# Patient Record
Sex: MALE | Race: BLACK OR AFRICAN AMERICAN | NOT HISPANIC OR LATINO | Employment: OTHER | URBAN - METROPOLITAN AREA
[De-identification: names, ages, dates, MRNs, and addresses within clinical notes are randomized per-mention and may not be internally consistent; named-entity substitution may affect disease eponyms.]

---

## 2017-01-25 DIAGNOSIS — M22.2X2 PATELLOFEMORAL SYNDROME, LEFT: ICD-10-CM

## 2017-01-25 DIAGNOSIS — M22.41 PATELLA, CHONDROMALACIA, RIGHT: Primary | ICD-10-CM

## 2017-03-02 ENCOUNTER — TRANSCRIBE ORDERS (OUTPATIENT)
Dept: LAB | Facility: HOSPITAL | Age: 57
End: 2017-03-02

## 2017-03-02 ENCOUNTER — LAB (OUTPATIENT)
Dept: LAB | Facility: HOSPITAL | Age: 57
End: 2017-03-02
Attending: ORTHOPAEDIC SURGERY

## 2017-03-02 DIAGNOSIS — Z01.818 PRE-OP TESTING: ICD-10-CM

## 2017-03-02 DIAGNOSIS — Z01.818 PRE-OP TESTING: Primary | ICD-10-CM

## 2017-03-02 LAB
ALBUMIN SERPL-MCNC: 3.9 G/DL (ref 3.5–5.2)
ALBUMIN/GLOB SERPL: 0.9 G/DL
ALP SERPL-CCNC: 75 U/L (ref 39–117)
ALT SERPL W P-5'-P-CCNC: 17 U/L (ref 1–41)
ANION GAP SERPL CALCULATED.3IONS-SCNC: 12 MMOL/L
AST SERPL-CCNC: 18 U/L (ref 1–40)
BASOPHILS # BLD AUTO: 0.07 10*3/MM3 (ref 0–0.2)
BASOPHILS NFR BLD AUTO: 0.6 % (ref 0–1.5)
BILIRUB SERPL-MCNC: 1 MG/DL (ref 0.1–1.2)
BUN BLD-MCNC: 10 MG/DL (ref 6–20)
BUN/CREAT SERPL: 11 (ref 7–25)
CALCIUM SPEC-SCNC: 9.5 MG/DL (ref 8.6–10.5)
CHLORIDE SERPL-SCNC: 104 MMOL/L (ref 98–107)
CO2 SERPL-SCNC: 24 MMOL/L (ref 22–29)
CREAT BLD-MCNC: 0.91 MG/DL (ref 0.76–1.27)
DEPRECATED RDW RBC AUTO: 46 FL (ref 37–54)
EOSINOPHIL # BLD AUTO: 0.33 10*3/MM3 (ref 0–0.7)
EOSINOPHIL NFR BLD AUTO: 2.9 % (ref 0.3–6.2)
ERYTHROCYTE [DISTWIDTH] IN BLOOD BY AUTOMATED COUNT: 16.5 % (ref 11.5–14.5)
GFR SERPL CREATININE-BSD FRML MDRD: 104 ML/MIN/1.73
GLOBULIN UR ELPH-MCNC: 4.3 GM/DL
GLUCOSE BLD-MCNC: 89 MG/DL (ref 65–99)
HCT VFR BLD AUTO: 29.4 % (ref 40.4–52.2)
HGB BLD-MCNC: 10.6 G/DL (ref 13.7–17.6)
IMM GRANULOCYTES # BLD: 0.04 10*3/MM3 (ref 0–0.03)
IMM GRANULOCYTES NFR BLD: 0.4 % (ref 0–0.5)
LYMPHOCYTES # BLD AUTO: 3.6 10*3/MM3 (ref 0.9–4.8)
LYMPHOCYTES NFR BLD AUTO: 32.2 % (ref 19.6–45.3)
MCH RBC QN AUTO: 28 PG (ref 27–32.7)
MCHC RBC AUTO-ENTMCNC: 36.1 G/DL (ref 32.6–36.4)
MCV RBC AUTO: 77.6 FL (ref 79.8–96.2)
MONOCYTES # BLD AUTO: 1.11 10*3/MM3 (ref 0.2–1.2)
MONOCYTES NFR BLD AUTO: 9.9 % (ref 5–12)
NEUTROPHILS # BLD AUTO: 6.04 10*3/MM3 (ref 1.9–8.1)
NEUTROPHILS NFR BLD AUTO: 54 % (ref 42.7–76)
PLATELET # BLD AUTO: 276 10*3/MM3 (ref 140–500)
PMV BLD AUTO: 11.1 FL (ref 6–12)
POTASSIUM BLD-SCNC: 4.2 MMOL/L (ref 3.5–5.2)
PROT SERPL-MCNC: 8.2 G/DL (ref 6–8.5)
RBC # BLD AUTO: 3.79 10*6/MM3 (ref 4.6–6)
SODIUM BLD-SCNC: 140 MMOL/L (ref 136–145)
WBC NRBC COR # BLD: 11.19 10*3/MM3 (ref 4.5–10.7)

## 2017-03-02 PROCEDURE — 85025 COMPLETE CBC W/AUTO DIFF WBC: CPT

## 2017-03-02 PROCEDURE — 80053 COMPREHEN METABOLIC PANEL: CPT

## 2017-03-02 PROCEDURE — 36415 COLL VENOUS BLD VENIPUNCTURE: CPT

## 2017-03-13 RX ORDER — MONTELUKAST SODIUM 10 MG/1
TABLET ORAL
Qty: 90 TABLET | Refills: 0 | Status: SHIPPED | OUTPATIENT
Start: 2017-03-13 | End: 2017-06-08 | Stop reason: SDUPTHER

## 2017-03-13 RX ORDER — TRIAMCINOLONE ACETONIDE 0.25 MG/G
CREAM TOPICAL
Qty: 240 G | Refills: 0 | Status: SHIPPED | OUTPATIENT
Start: 2017-03-13 | End: 2017-07-18 | Stop reason: SDUPTHER

## 2017-03-16 RX ORDER — FAMCICLOVIR 500 MG/1
TABLET ORAL
Qty: 24 TABLET | Refills: 0 | Status: SHIPPED | OUTPATIENT
Start: 2017-03-16 | End: 2017-07-18 | Stop reason: SDUPTHER

## 2017-04-21 ENCOUNTER — OFFICE VISIT (OUTPATIENT)
Dept: FAMILY MEDICINE CLINIC | Facility: CLINIC | Age: 57
End: 2017-04-21

## 2017-04-21 VITALS
HEART RATE: 79 BPM | DIASTOLIC BLOOD PRESSURE: 84 MMHG | SYSTOLIC BLOOD PRESSURE: 122 MMHG | OXYGEN SATURATION: 98 % | WEIGHT: 211 LBS | HEIGHT: 70 IN | BODY MASS INDEX: 30.21 KG/M2

## 2017-04-21 DIAGNOSIS — I10 BENIGN ESSENTIAL HTN: Primary | ICD-10-CM

## 2017-04-21 PROCEDURE — 99213 OFFICE O/P EST LOW 20 MIN: CPT | Performed by: FAMILY MEDICINE

## 2017-04-21 NOTE — PROGRESS NOTES
Subjective   Henrry Glover is a 57 y.o. male.     Chief Complaint   Patient presents with   • Hypertension     elevated x 2 weeks      Social History   Substance Use Topics   • Smoking status: Former Smoker     Packs/day: 0.25     Years: 10.00     Types: Cigarettes     Quit date: 6/7/1981   • Smokeless tobacco: Never Used   • Alcohol use No       History of Present Illness     Has had several BPs at work, 144/84, then went back and it was 144/94; went back a few days later and it was 130/84. Denies FH of HTN. He uses a lot of salt but he has been using a lot of salt.     The following portions of the patient's history were reviewed and updated as appropriate:PMHroutine: Social history , Allergies, Current Medications, Active Problem List, Family History and Health Maintenance    Review of Systems   Constitutional: Negative for activity change, appetite change, chills, fatigue, fever and unexpected weight change.   HENT: Negative for congestion, ear pain, hearing loss, mouth sores, nosebleeds, rhinorrhea and sore throat.    Eyes: Negative for pain and visual disturbance.   Respiratory: Negative for cough, shortness of breath and wheezing.    Cardiovascular: Negative for chest pain, palpitations and leg swelling.   Gastrointestinal: Negative for abdominal distention, abdominal pain, blood in stool, constipation, diarrhea, nausea and vomiting.   Endocrine: Negative for cold intolerance and heat intolerance.   Genitourinary: Negative for difficulty urinating, discharge, dysuria, frequency, hematuria and urgency.   Musculoskeletal: Positive for back pain. Negative for joint swelling.   Skin: Negative for rash and wound.   Neurological: Negative for dizziness, weakness, numbness and headaches.   Hematological: Does not bruise/bleed easily.   Psychiatric/Behavioral: Negative for confusion, dysphoric mood, sleep disturbance and suicidal ideas. The patient is not nervous/anxious.        Objective   Vitals:    04/21/17 1318  "  BP: 122/84   Pulse: 79   SpO2: 98%   Weight: 211 lb (95.7 kg)   Height: 70\" (177.8 cm)     Body mass index is 30.28 kg/(m^2).    Physical Exam   Constitutional: He appears well-developed and well-nourished.   Psychiatric: He has a normal mood and affect. His behavior is normal. Judgment and thought content normal.   Vitals reviewed.  [facility] in 3/17    Assessment/Plan   Problem List Items Addressed This Visit        Cardiovascular and Mediastinum    Benign essential HTN - Primary    Overview     OVERVIEW:  Has been having some borderline numbers, in the 140s and the 80-90's range. He is going to make a strong effort to reduce his salt intake and see me back in one month.                   "

## 2017-04-21 NOTE — PATIENT INSTRUCTIONS

## 2017-05-12 ENCOUNTER — OFFICE VISIT (OUTPATIENT)
Dept: FAMILY MEDICINE CLINIC | Facility: CLINIC | Age: 57
End: 2017-05-12

## 2017-05-12 VITALS
BODY MASS INDEX: 30.21 KG/M2 | DIASTOLIC BLOOD PRESSURE: 80 MMHG | SYSTOLIC BLOOD PRESSURE: 140 MMHG | OXYGEN SATURATION: 98 % | HEART RATE: 67 BPM | WEIGHT: 211 LBS | HEIGHT: 70 IN

## 2017-05-12 DIAGNOSIS — I10 BENIGN ESSENTIAL HTN: Primary | ICD-10-CM

## 2017-05-12 PROCEDURE — 99214 OFFICE O/P EST MOD 30 MIN: CPT | Performed by: FAMILY MEDICINE

## 2017-05-12 PROCEDURE — 93000 ELECTROCARDIOGRAM COMPLETE: CPT | Performed by: FAMILY MEDICINE

## 2017-05-12 RX ORDER — AMLODIPINE BESYLATE 5 MG/1
5 TABLET ORAL DAILY
Qty: 30 TABLET | Refills: 1 | Status: SHIPPED | OUTPATIENT
Start: 2017-05-12 | End: 2017-07-03 | Stop reason: SDUPTHER

## 2017-06-08 RX ORDER — MONTELUKAST SODIUM 10 MG/1
TABLET ORAL
Qty: 90 TABLET | Refills: 0 | Status: SHIPPED | OUTPATIENT
Start: 2017-06-08 | End: 2017-09-10 | Stop reason: SDUPTHER

## 2017-06-16 ENCOUNTER — OFFICE VISIT (OUTPATIENT)
Dept: FAMILY MEDICINE CLINIC | Facility: CLINIC | Age: 57
End: 2017-06-16

## 2017-06-16 VITALS
SYSTOLIC BLOOD PRESSURE: 126 MMHG | HEIGHT: 70 IN | OXYGEN SATURATION: 98 % | DIASTOLIC BLOOD PRESSURE: 64 MMHG | WEIGHT: 214 LBS | BODY MASS INDEX: 30.64 KG/M2 | HEART RATE: 68 BPM

## 2017-06-16 DIAGNOSIS — I10 BENIGN ESSENTIAL HTN: Primary | ICD-10-CM

## 2017-06-16 PROCEDURE — 99213 OFFICE O/P EST LOW 20 MIN: CPT | Performed by: FAMILY MEDICINE

## 2017-06-16 NOTE — PROGRESS NOTES
"Henrry Glover is a 57 y.o. male.  Seen 06/16/2017    Assessment/Plan   Problem List Items Addressed This Visit        Cardiovascular and Mediastinum    Benign essential HTN - Primary    Overview     Fili 6/16/2017  BP appears controlled with cuff here but not home cuff. Will return to office w/cuff to check. No further adjustments to meds until we know its accuracy. Fili 5/12/2017  BP not controlled despite his effort with salt intake. We will begin amlodipine 5 mg today and see him back in one month. SEs discussed. Continue to monitor salt intacke. 4/21/17:  Has been having some borderline numbers, in the 140s and the 80-90's range. He is going to make a strong effort to reduce his salt intake and see me back in one month.                 No Follow-up on file.  Patient Instructions   return to office w/cuff to check accuracy. Further tx determined by this.     Vitals:    06/16/17 1530   BP: 126/64   Pulse: 68   SpO2: 98%   Weight: 214 lb (97.1 kg)   Height: 70\" (177.8 cm)     Body mass index is 30.71 kg/(m^2).    Subjective     Chief Complaint   Patient presents with   • Hypertension     4 week f/u      Social History   Substance Use Topics   • Smoking status: Former Smoker     Packs/day: 0.25     Years: 10.00     Types: Cigarettes     Quit date: 6/7/1981   • Smokeless tobacco: Never Used   • Alcohol use No       History of Present Illness     Patient brings in his blood pressure read out but not the cuff that goes along with it.  His blood pressures are significantly higher using this cuff than what we get at the office.  I would like him to bring the cuff in and have it checked against our blood pressure cuff.  It looks like his blood pressure is well controlled here in the office.    The following portions of the patient's history were reviewed and updated as appropriate:PMHroutine: Social history , Allergies, Current Medications, Active Problem List and Health Maintenance    Review of Systems "   Constitutional: Negative for activity change, appetite change, chills, fatigue, fever and unexpected weight change.   HENT: Negative for congestion, ear pain, hearing loss, mouth sores, nosebleeds, rhinorrhea and sore throat.    Eyes: Negative for pain and visual disturbance.   Respiratory: Negative for cough, shortness of breath and wheezing.    Cardiovascular: Negative for chest pain, palpitations and leg swelling.   Gastrointestinal: Negative for abdominal distention, abdominal pain, blood in stool, constipation, diarrhea, nausea and vomiting.   Endocrine: Negative for cold intolerance and heat intolerance.   Genitourinary: Negative for difficulty urinating, discharge, dysuria, frequency, hematuria and urgency.   Musculoskeletal: Negative for back pain and joint swelling.   Skin: Negative for rash and wound.   Neurological: Negative for dizziness, weakness, numbness and headaches.   Hematological: Does not bruise/bleed easily.   Psychiatric/Behavioral: Negative for confusion, dysphoric mood, sleep disturbance and suicidal ideas. The patient is not nervous/anxious.        Objective   Physical Exam   Constitutional: He appears well-developed and well-nourished.   Psychiatric: He has a normal mood and affect. His behavior is normal. Judgment and thought content normal.   Vitals reviewed.

## 2017-07-03 DIAGNOSIS — I10 BENIGN ESSENTIAL HTN: ICD-10-CM

## 2017-07-03 RX ORDER — AMLODIPINE BESYLATE 5 MG/1
TABLET ORAL
Qty: 90 TABLET | Refills: 0 | Status: SHIPPED | OUTPATIENT
Start: 2017-07-03 | End: 2017-09-23 | Stop reason: SDUPTHER

## 2017-07-19 RX ORDER — FAMCICLOVIR 500 MG/1
TABLET ORAL
Qty: 24 TABLET | Refills: 0 | Status: SHIPPED | OUTPATIENT
Start: 2017-07-19

## 2017-07-19 RX ORDER — TRIAMCINOLONE ACETONIDE 0.25 MG/G
CREAM TOPICAL
Qty: 240 G | Refills: 0 | Status: SHIPPED | OUTPATIENT
Start: 2017-07-19 | End: 2017-12-08 | Stop reason: SDUPTHER

## 2017-09-11 RX ORDER — MONTELUKAST SODIUM 10 MG/1
TABLET ORAL
Qty: 90 TABLET | Refills: 0 | Status: SHIPPED | OUTPATIENT
Start: 2017-09-11 | End: 2017-12-09 | Stop reason: SDUPTHER

## 2017-09-23 DIAGNOSIS — I10 BENIGN ESSENTIAL HTN: ICD-10-CM

## 2017-09-25 RX ORDER — AMLODIPINE BESYLATE 5 MG/1
TABLET ORAL
Qty: 90 TABLET | Refills: 0 | Status: SHIPPED | OUTPATIENT
Start: 2017-09-25 | End: 2017-12-21 | Stop reason: SDUPTHER

## 2017-11-21 ENCOUNTER — OFFICE VISIT (OUTPATIENT)
Dept: FAMILY MEDICINE CLINIC | Facility: CLINIC | Age: 57
End: 2017-11-21

## 2017-11-21 VITALS
BODY MASS INDEX: 29.49 KG/M2 | OXYGEN SATURATION: 99 % | SYSTOLIC BLOOD PRESSURE: 130 MMHG | HEIGHT: 70 IN | DIASTOLIC BLOOD PRESSURE: 74 MMHG | HEART RATE: 64 BPM | WEIGHT: 206 LBS

## 2017-11-21 DIAGNOSIS — Z00.00 HEALTHCARE MAINTENANCE: Primary | ICD-10-CM

## 2017-11-21 DIAGNOSIS — R51.9 NONINTRACTABLE EPISODIC HEADACHE, UNSPECIFIED HEADACHE TYPE: ICD-10-CM

## 2017-11-21 LAB
ALBUMIN SERPL-MCNC: 4.3 G/DL (ref 3.5–5.2)
ALBUMIN/GLOB SERPL: 1.1 G/DL
ALP SERPL-CCNC: 85 U/L (ref 39–117)
ALT SERPL-CCNC: 18 U/L (ref 1–41)
AST SERPL-CCNC: 32 U/L (ref 1–40)
BILIRUB SERPL-MCNC: 1 MG/DL (ref 0.1–1.2)
BUN SERPL-MCNC: 9 MG/DL (ref 6–20)
BUN/CREAT SERPL: 11.3 (ref 7–25)
CALCIUM SERPL-MCNC: 9.8 MG/DL (ref 8.6–10.5)
CHLORIDE SERPL-SCNC: 105 MMOL/L (ref 98–107)
CHOLEST SERPL-MCNC: 107 MG/DL (ref 0–200)
CO2 SERPL-SCNC: 23.5 MMOL/L (ref 22–29)
CREAT SERPL-MCNC: 0.8 MG/DL (ref 0.76–1.27)
GFR SERPLBLD CREATININE-BSD FMLA CKD-EPI: 120 ML/MIN/1.73
GFR SERPLBLD CREATININE-BSD FMLA CKD-EPI: 99 ML/MIN/1.73
GLOBULIN SER CALC-MCNC: 4 GM/DL
GLUCOSE SERPL-MCNC: 88 MG/DL (ref 65–99)
HDLC SERPL-MCNC: 38 MG/DL (ref 40–60)
LDLC SERPL CALC-MCNC: 57 MG/DL (ref 0–100)
LDLC/HDLC SERPL: 1.49 {RATIO}
POTASSIUM SERPL-SCNC: 4.4 MMOL/L (ref 3.5–5.2)
PROT SERPL-MCNC: 8.3 G/DL (ref 6–8.5)
SODIUM SERPL-SCNC: 140 MMOL/L (ref 136–145)
TRIGL SERPL-MCNC: 61 MG/DL (ref 0–150)
VLDLC SERPL CALC-MCNC: 12.2 MG/DL (ref 5–40)

## 2017-11-21 PROCEDURE — 90715 TDAP VACCINE 7 YRS/> IM: CPT | Performed by: FAMILY MEDICINE

## 2017-11-21 PROCEDURE — 90472 IMMUNIZATION ADMIN EACH ADD: CPT | Performed by: FAMILY MEDICINE

## 2017-11-21 PROCEDURE — 90471 IMMUNIZATION ADMIN: CPT | Performed by: FAMILY MEDICINE

## 2017-11-21 PROCEDURE — 99213 OFFICE O/P EST LOW 20 MIN: CPT | Performed by: FAMILY MEDICINE

## 2017-11-21 PROCEDURE — 90686 IIV4 VACC NO PRSV 0.5 ML IM: CPT | Performed by: FAMILY MEDICINE

## 2017-11-21 NOTE — PROGRESS NOTES
Henrry Glover is a 57 y.o. male.      Assessment/Plan   Problem List Items Addressed This Visit     None      Visit Diagnoses     Healthcare maintenance    -  Primary    Relevant Orders    Comprehensive metabolic panel    Lipid Panel With LDL/HDL Ratio    Nonintractable episodic headache, unspecified headache type                 Return if symptoms worsen or fail to improve.  Patient Instructions   Use of a neti pot.   See eye doctor to rule out eye problems causing symptoms.       Chief Complaint   Patient presents with   • Headache     x 10 days      Social History   Substance Use Topics   • Smoking status: Former Smoker     Packs/day: 0.25     Years: 10.00     Types: Cigarettes     Quit date: 6/7/1981   • Smokeless tobacco: Never Used   • Alcohol use No       History of Present Illness     Not painful but an annoying MITTAL. Occ takes an advil. Can be forehead, left temple, between his eyes. Not used to having a headache. BPs have been running good and no correlation between the HA and the BP. He has had some sinus symptoms. Using flonase, singulair, and OTC allegra. Getting allergy shots. Those are not new meds. No new meds. Not time of day association. Advil helps. He only notices them if he is quiet or sitting. He has been out in the cold more. More of a pressure than anything else. Doesn't usually notice it in the middle of the night. Hasn't seen an eye doctor recently. Stays stressed but doesn't think it is related to that.     The following portions of the patient's history were reviewed and updated as appropriate:PMHroutine: Social history , Allergies, Current Medications, Active Problem List and Health Maintenance    Review of Systems   Constitutional: Negative for activity change, appetite change, chills, fatigue, fever and unexpected weight change.   HENT: Negative for congestion, ear pain, hearing loss, mouth sores, nosebleeds, rhinorrhea and sore throat.    Eyes: Negative for pain and visual disturbance.  "  Respiratory: Negative for cough, shortness of breath and wheezing.    Cardiovascular: Negative for chest pain, palpitations and leg swelling.   Gastrointestinal: Negative for abdominal distention, abdominal pain, blood in stool, constipation, diarrhea, nausea and vomiting.   Endocrine: Negative for cold intolerance and heat intolerance.   Genitourinary: Negative for difficulty urinating, discharge, dysuria, frequency, hematuria and urgency.   Musculoskeletal: Negative for back pain and joint swelling.   Skin: Negative for rash and wound.   Neurological: Positive for headaches. Negative for dizziness, weakness and numbness.   Hematological: Does not bruise/bleed easily.   Psychiatric/Behavioral: Negative for confusion, dysphoric mood, sleep disturbance and suicidal ideas. The patient is not nervous/anxious.        Objective   Vitals:    11/21/17 0859   BP: 130/74   Pulse: 64   SpO2: 99%   Weight: 206 lb (93.4 kg)   Height: 70\" (177.8 cm)     Body mass index is 29.56 kg/(m^2).  Physical Exam   Constitutional: He is oriented to person, place, and time. He appears well-developed and well-nourished.   HENT:   NT sinuses   Neurological: He is alert and oriented to person, place, and time. No cranial nerve deficit.   Psychiatric: He has a normal mood and affect. His behavior is normal. Judgment and thought content normal.   Vitals reviewed.      "

## 2017-12-08 RX ORDER — TRIAMCINOLONE ACETONIDE 0.25 MG/G
CREAM TOPICAL
Qty: 240 G | Refills: 0 | Status: SHIPPED | OUTPATIENT
Start: 2017-12-08 | End: 2018-06-04 | Stop reason: SDUPTHER

## 2017-12-11 RX ORDER — MONTELUKAST SODIUM 10 MG/1
TABLET ORAL
Qty: 90 TABLET | Refills: 0 | Status: SHIPPED | OUTPATIENT
Start: 2017-12-11 | End: 2018-03-11 | Stop reason: SDUPTHER

## 2017-12-21 DIAGNOSIS — I10 BENIGN ESSENTIAL HTN: ICD-10-CM

## 2017-12-21 RX ORDER — AMLODIPINE BESYLATE 5 MG/1
TABLET ORAL
Qty: 90 TABLET | Refills: 0 | Status: SHIPPED | OUTPATIENT
Start: 2017-12-21 | End: 2018-03-18 | Stop reason: SDUPTHER

## 2018-03-12 RX ORDER — MONTELUKAST SODIUM 10 MG/1
TABLET ORAL
Qty: 90 TABLET | Refills: 1 | Status: SHIPPED | OUTPATIENT
Start: 2018-03-12 | End: 2018-09-22 | Stop reason: SDUPTHER

## 2018-03-18 DIAGNOSIS — I10 BENIGN ESSENTIAL HTN: ICD-10-CM

## 2018-03-19 RX ORDER — AMLODIPINE BESYLATE 5 MG/1
TABLET ORAL
Qty: 90 TABLET | Refills: 0 | Status: SHIPPED | OUTPATIENT
Start: 2018-03-19 | End: 2018-06-24 | Stop reason: SDUPTHER

## 2018-04-30 ENCOUNTER — CLINICAL SUPPORT (OUTPATIENT)
Dept: FAMILY MEDICINE CLINIC | Facility: CLINIC | Age: 58
End: 2018-04-30

## 2018-04-30 DIAGNOSIS — Z23 NEED FOR VACCINATION: Primary | ICD-10-CM

## 2018-04-30 PROCEDURE — 90471 IMMUNIZATION ADMIN: CPT | Performed by: FAMILY MEDICINE

## 2018-04-30 PROCEDURE — 90632 HEPA VACCINE ADULT IM: CPT | Performed by: FAMILY MEDICINE

## 2018-05-22 RX ORDER — TRIAMCINOLONE ACETONIDE 0.25 MG/G
CREAM TOPICAL
Qty: 240 G | Refills: 0 | OUTPATIENT
Start: 2018-05-22

## 2018-05-23 ENCOUNTER — OFFICE VISIT (OUTPATIENT)
Dept: FAMILY MEDICINE CLINIC | Facility: CLINIC | Age: 58
End: 2018-05-23

## 2018-05-23 VITALS
HEIGHT: 70 IN | BODY MASS INDEX: 29.63 KG/M2 | OXYGEN SATURATION: 97 % | RESPIRATION RATE: 16 BRPM | WEIGHT: 207 LBS | HEART RATE: 67 BPM | DIASTOLIC BLOOD PRESSURE: 82 MMHG | SYSTOLIC BLOOD PRESSURE: 144 MMHG

## 2018-05-23 DIAGNOSIS — M65.352 TRIGGER LITTLE FINGER OF LEFT HAND: ICD-10-CM

## 2018-05-23 DIAGNOSIS — N40.1 BENIGN PROSTATIC HYPERPLASIA WITH URINARY FREQUENCY: ICD-10-CM

## 2018-05-23 DIAGNOSIS — R31.9 HEMATURIA, UNSPECIFIED TYPE: Primary | ICD-10-CM

## 2018-05-23 DIAGNOSIS — I10 ESSENTIAL HYPERTENSION: ICD-10-CM

## 2018-05-23 DIAGNOSIS — Z86.2 HISTORY OF SICKLE CELL DISEASE: ICD-10-CM

## 2018-05-23 DIAGNOSIS — R35.0 BENIGN PROSTATIC HYPERPLASIA WITH URINARY FREQUENCY: ICD-10-CM

## 2018-05-23 LAB
ALBUMIN SERPL-MCNC: 4.1 G/DL (ref 3.5–5.2)
ALBUMIN/GLOB SERPL: 1 G/DL
ALP SERPL-CCNC: 75 U/L (ref 39–117)
ALT SERPL-CCNC: 20 U/L (ref 1–41)
AST SERPL-CCNC: 22 U/L (ref 1–40)
BASOPHILS # BLD AUTO: 0.07 10*3/MM3 (ref 0–0.2)
BASOPHILS NFR BLD AUTO: 0.5 % (ref 0–1.5)
BILIRUB BLD-MCNC: NEGATIVE MG/DL
BILIRUB SERPL-MCNC: 1 MG/DL (ref 0.1–1.2)
BUN SERPL-MCNC: 13 MG/DL (ref 8–23)
BUN/CREAT SERPL: 14.3 (ref 7–25)
CALCIUM SERPL-MCNC: 10 MG/DL (ref 8.6–10.5)
CHLORIDE SERPL-SCNC: 101 MMOL/L (ref 98–107)
CLARITY, POC: CLEAR
CO2 SERPL-SCNC: 25 MMOL/L (ref 22–29)
COLOR UR: ABNORMAL
CREAT SERPL-MCNC: 0.91 MG/DL (ref 0.76–1.27)
EOSINOPHIL # BLD AUTO: 0.32 10*3/MM3 (ref 0–0.7)
EOSINOPHIL NFR BLD AUTO: 2.4 % (ref 0.3–6.2)
ERYTHROCYTE [DISTWIDTH] IN BLOOD BY AUTOMATED COUNT: 15 % (ref 11.5–14.5)
GFR SERPLBLD CREATININE-BSD FMLA CKD-EPI: 103 ML/MIN/1.73
GFR SERPLBLD CREATININE-BSD FMLA CKD-EPI: 85 ML/MIN/1.73
GLOBULIN SER CALC-MCNC: 4 GM/DL
GLUCOSE SERPL-MCNC: 99 MG/DL (ref 65–99)
GLUCOSE UR STRIP-MCNC: NEGATIVE MG/DL
HCT VFR BLD AUTO: 31 % (ref 40.4–52.2)
HGB BLD-MCNC: 11 G/DL (ref 13.7–17.6)
IMM GRANULOCYTES # BLD: 0.06 10*3/MM3 (ref 0–0.03)
IMM GRANULOCYTES NFR BLD: 0.4 % (ref 0–0.5)
KETONES UR QL: NEGATIVE
LEUKOCYTE EST, POC: NEGATIVE
LYMPHOCYTES # BLD AUTO: 4.23 10*3/MM3 (ref 0.9–4.8)
LYMPHOCYTES NFR BLD AUTO: 31.5 % (ref 19.6–45.3)
MCH RBC QN AUTO: 30.6 PG (ref 27–32.7)
MCHC RBC AUTO-ENTMCNC: 35.5 G/DL (ref 32.6–36.4)
MCV RBC AUTO: 86.1 FL (ref 79.8–96.2)
MONOCYTES # BLD AUTO: 0.9 10*3/MM3 (ref 0.2–1.2)
MONOCYTES NFR BLD AUTO: 6.7 % (ref 5–12)
NEUTROPHILS # BLD AUTO: 7.91 10*3/MM3 (ref 1.9–8.1)
NEUTROPHILS NFR BLD AUTO: 58.9 % (ref 42.7–76)
NITRITE UR-MCNC: NEGATIVE MG/ML
NRBC BLD AUTO-RTO: 0.7 /100 WBC (ref 0–0)
PH UR: 5.5 [PH] (ref 5–8)
PLATELET # BLD AUTO: 238 10*3/MM3 (ref 140–500)
POTASSIUM SERPL-SCNC: 4.2 MMOL/L (ref 3.5–5.2)
PROT SERPL-MCNC: 8.1 G/DL (ref 6–8.5)
PROT UR STRIP-MCNC: NEGATIVE MG/DL
PSA SERPL-MCNC: 0.39 NG/ML (ref 0–4)
RBC # BLD AUTO: 3.6 10*6/MM3 (ref 4.6–6)
RBC # UR STRIP: ABNORMAL /UL
SODIUM SERPL-SCNC: 141 MMOL/L (ref 136–145)
SP GR UR: 0.01 (ref 1–1.03)
UROBILINOGEN UR QL: NORMAL
WBC # BLD AUTO: 13.43 10*3/MM3 (ref 4.5–10.7)

## 2018-05-23 PROCEDURE — 99214 OFFICE O/P EST MOD 30 MIN: CPT | Performed by: NURSE PRACTITIONER

## 2018-05-23 PROCEDURE — 81002 URINALYSIS NONAUTO W/O SCOPE: CPT | Performed by: NURSE PRACTITIONER

## 2018-05-23 RX ORDER — CETIRIZINE HYDROCHLORIDE 10 MG/1
10 TABLET ORAL DAILY
COMMUNITY

## 2018-05-23 NOTE — PATIENT INSTRUCTIONS
Hematuria, Adult  Hematuria is blood in your urine. It can be caused by a bladder infection, kidney infection, prostate infection, kidney stone, or cancer of your urinary tract. Infections can usually be treated with medicine, and a kidney stone usually will pass through your urine. If neither of these is the cause of your hematuria, further workup to find out the reason may be needed.  It is very important that you tell your health care provider about any blood you see in your urine, even if the blood stops without treatment or happens without causing pain. Blood in your urine that happens and then stops and then happens again can be a symptom of a very serious condition. Also, pain is not a symptom in the initial stages of many urinary cancers.  Follow these instructions at home:  · Drink lots of fluid, 3-4 quarts a day. If you have been diagnosed with an infection, cranberry juice is especially recommended, in addition to large amounts of water.  · Avoid caffeine, tea, and carbonated beverages because they tend to irritate the bladder.  · Avoid alcohol because it may irritate the prostate.  · Take all medicines as directed by your health care provider.  · If you were prescribed an antibiotic medicine, finish it all even if you start to feel better.  · If you have been diagnosed with a kidney stone, follow your health care provider's instructions regarding straining your urine to catch the stone.  · Empty your bladder often. Avoid holding urine for long periods of time.  · After a bowel movement, women should cleanse front to back. Use each tissue only once.  · Empty your bladder before and after sexual intercourse if you are a female.  Contact a health care provider if:  · You develop back pain.  · You have a fever.  · You have a feeling of sickness in your stomach (nausea) or vomiting.  · Your symptoms are not better in 3 days. Return sooner if you are getting worse.  Get help right away if:  · You develop  severe vomiting and are unable to keep the medicine down.  · You develop severe back or abdominal pain despite taking your medicines.  · You begin passing a large amount of blood or clots in your urine.  · You feel extremely weak or faint, or you pass out.  This information is not intended to replace advice given to you by your health care provider. Make sure you discuss any questions you have with your health care provider.  Document Released: 12/18/2006 Document Revised: 05/25/2017 Document Reviewed: 08/18/2014  [employer] Interactive Patient Education © 2017 [employer]

## 2018-05-23 NOTE — PROGRESS NOTES
Henrry Glover is a 58 y.o. male. Pt is here for hematuria and L pinkie finger issues. He states for about a month he is having issues with his finger. He says when open and close his hand, the pinkie finger get stuck, and needs to help with other hand to release it. He says it hurts sometimes.Denies trauma or injury.    New patient to me - pt of Dr Jain. He has a hx of chronic sickle cell disease.   Pt says about 7 days ago, he noticed blood clots in his urine. It's been having blood in urine on an off since then. Pt using Flomax for slightly enlarged prostate. He had problems to urinate in the past. He states he is doing ok, feeling nothing different.   Pt has sickle cell c without crisis. Pt has a hx of BPH - pt reports a long standing history of blood in urine. Pt has had multiple sickle cell crisis and last one was 10 years ago.   Hematuria: Patient complains of gross hematuria. Onset of hematuria was 4 days ago and was sudden in onset. There is not a history of nephrolithiasis. There is not a history of urologic trauma. Other urologic symptoms include intermittency, sense of residual urine. Patient admits to history of BPH. Patient denies history of previous infection, urolithiasis,  trauma,  cancer,  surgery, sexually transmitted diseases and chronic [place] catheter. Prior workup has been pt has followed with urology - has not seen nephrology..      Assessment/Plan   Problem List Items Addressed This Visit     None      Visit Diagnoses     Hematuria, unspecified type    -  Primary    Relevant Orders    POCT urinalysis dipstick, manual (Completed)    Urinalysis With / Culture If Indicated - Urine, Clean Catch    Ambulatory Referral to Urology    Comprehensive metabolic panel    PSA SCREENING    Ambulatory Referral to Nephrology    CBC & Differential    Hemoglobin A1c    History of sickle cell disease        Relevant Orders    Ambulatory Referral to Nephrology    CBC & Differential    Hemoglobin A1c     Benign prostatic hyperplasia with urinary frequency        Essential hypertension        Relevant Orders    Ambulatory Referral to Nephrology    Trigger little finger of left hand        Relevant Orders    Ambulatory Referral to Hand Surgery             Return for Annual, Recheck.  Patient Instructions   Hematuria, Adult  Hematuria is blood in your urine. It can be caused by a bladder infection, kidney infection, prostate infection, kidney stone, or cancer of your urinary tract. Infections can usually be treated with medicine, and a kidney stone usually will pass through your urine. If neither of these is the cause of your hematuria, further workup to find out the reason may be needed.  It is very important that you tell your health care provider about any blood you see in your urine, even if the blood stops without treatment or happens without causing pain. Blood in your urine that happens and then stops and then happens again can be a symptom of a very serious condition. Also, pain is not a symptom in the initial stages of many urinary cancers.  Follow these instructions at home:  · Drink lots of fluid, 3-4 quarts a day. If you have been diagnosed with an infection, cranberry juice is especially recommended, in addition to large amounts of water.  · Avoid caffeine, tea, and carbonated beverages because they tend to irritate the bladder.  · Avoid alcohol because it may irritate the prostate.  · Take all medicines as directed by your health care provider.  · If you were prescribed an antibiotic medicine, finish it all even if you start to feel better.  · If you have been diagnosed with a kidney stone, follow your health care provider's instructions regarding straining your urine to catch the stone.  · Empty your bladder often. Avoid holding urine for long periods of time.  · After a bowel movement, women should cleanse front to back. Use each tissue only once.  · Empty your bladder before and after sexual  intercourse if you are a female.  Contact a health care provider if:  · You develop back pain.  · You have a fever.  · You have a feeling of sickness in your stomach (nausea) or vomiting.  · Your symptoms are not better in 3 days. Return sooner if you are getting worse.  Get help right away if:  · You develop severe vomiting and are unable to keep the medicine down.  · You develop severe back or abdominal pain despite taking your medicines.  · You begin passing a large amount of blood or clots in your urine.  · You feel extremely weak or faint, or you pass out.  This information is not intended to replace advice given to you by your health care provider. Make sure you discuss any questions you have with your health care provider.  Document Released: 12/18/2006 Document Revised: 05/25/2017 Document Reviewed: 08/18/2014  [employer] Interactive Patient Education © 2017 [employer]        Chief Complaint   Patient presents with   • Hand Pain   • Blood in Urine     Social History   Substance Use Topics   • Smoking status: Former Smoker     Packs/day: 0.25     Years: 10.00     Types: Cigarettes     Quit date: 6/7/1981   • Smokeless tobacco: Never Used   • Alcohol use No       History of Present Illness     The following portions of the patient's history were reviewed and updated as appropriate:PMHroutine: Social history , Allergies, Current Medications, Active Problem List and Health Maintenance    Review of Systems   Cardiovascular: Negative for chest pain, palpitations and leg swelling.   Genitourinary: Positive for frequency, hematuria, penile swelling and scrotal swelling. Negative for decreased urine volume, difficulty urinating, discharge, dysuria, enuresis, flank pain, genital sores, penile pain, testicular pain and urgency.   Hematological: Negative for adenopathy. Does not bruise/bleed easily.       Objective   Vitals:    05/23/18 0930   BP: 144/82   BP Location: Left arm   Pulse: 67   Resp: 16   SpO2: 97%  "  Weight: 93.9 kg (207 lb)   Height: 177.8 cm (70\")     Body mass index is 29.7 kg/m².  Physical Exam   Constitutional: He is oriented to person, place, and time. Vital signs are normal. He appears well-developed and well-nourished.   HENT:   Head: Normocephalic and atraumatic.   Right Ear: External ear normal.   Left Ear: External ear normal.   Nose: Nose normal.   Mouth/Throat: Oropharynx is clear and moist.   Eyes: Conjunctivae and EOM are normal. Pupils are equal, round, and reactive to light.   Neck: Normal range of motion. Neck supple.   Cardiovascular: Normal rate, regular rhythm, normal heart sounds and intact distal pulses.    Pulmonary/Chest: Effort normal and breath sounds normal.   Abdominal: Soft. Normal appearance and bowel sounds are normal.   Musculoskeletal: Normal range of motion.   Left 5th digit with trigger and click felt to dorsal aspect tendon   Neurological: He is alert and oriented to person, place, and time. He has normal reflexes.   Skin: Skin is warm and dry.   Psychiatric: He has a normal mood and affect. His behavior is normal. Judgment and thought content normal.   Nursing note and vitals reviewed.  No CVA tenderness  Reviewed Data:  Office Visit on 05/23/2018   Component Date Value Ref Range Status   • Color 05/23/2018 Carmen  Yellow, Straw, Dark Yellow, Carmen Final   • Clarity, UA 05/23/2018 Clear  Clear Final   • Glucose, UA 05/23/2018 Negative  Negative, 1000 mg/dL (3+) mg/dL Final   • Bilirubin 05/23/2018 Negative  Negative Final   • Ketones, UA 05/23/2018 Negative  Negative Final   • Specific Gravity  05/23/2018 0.010* 1.005 - 1.030 Final   • Blood, UA 05/23/2018 Trace* Negative Final   • pH, Urine 05/23/2018 5.5  5.0 - 8.0 Final   • Protein, POC 05/23/2018 Negative  Negative mg/dL Final   • Urobilinogen, UA 05/23/2018 Normal  Normal Final   • Leukocytes 05/23/2018 Negative  Negative Final   • Nitrite, UA 05/23/2018 Negative  Negative Final         "

## 2018-05-24 LAB
APPEARANCE UR: CLEAR
BACTERIA #/AREA URNS HPF: NORMAL /HPF
BILIRUB UR QL STRIP: NEGATIVE
COLOR UR: YELLOW
EPI CELLS #/AREA URNS HPF: NORMAL /HPF
GLUCOSE UR QL: NEGATIVE
HBA1C MFR BLD: <4.3 % (ref 4.8–5.6)
HGB UR QL STRIP: NEGATIVE
KETONES UR QL STRIP: NEGATIVE
LEUKOCYTE ESTERASE UR QL STRIP: NEGATIVE
MICRO URNS: NORMAL
MICRO URNS: NORMAL
MUCOUS THREADS URNS QL MICRO: PRESENT /HPF
NITRITE UR QL STRIP: NEGATIVE
PH UR STRIP: 6 [PH] (ref 5–7.5)
PROT UR QL STRIP: NEGATIVE
RBC #/AREA URNS HPF: NORMAL /HPF
SP GR UR: 1.01 (ref 1–1.03)
URINALYSIS REFLEX: NORMAL
UROBILINOGEN UR STRIP-MCNC: 0.2 MG/DL (ref 0.2–1)
WBC #/AREA URNS HPF: NORMAL /HPF

## 2018-06-04 RX ORDER — TRIAMCINOLONE ACETONIDE 0.25 MG/G
CREAM TOPICAL 3 TIMES DAILY
Qty: 240 G | Refills: 0 | Status: SHIPPED | OUTPATIENT
Start: 2018-06-04 | End: 2019-01-09 | Stop reason: SDUPTHER

## 2018-06-13 DIAGNOSIS — I10 BENIGN ESSENTIAL HTN: ICD-10-CM

## 2018-06-13 RX ORDER — AMLODIPINE BESYLATE 5 MG/1
TABLET ORAL
Qty: 90 TABLET | Refills: 0 | OUTPATIENT
Start: 2018-06-13

## 2018-06-24 DIAGNOSIS — I10 BENIGN ESSENTIAL HTN: ICD-10-CM

## 2018-06-25 RX ORDER — AMLODIPINE BESYLATE 5 MG/1
TABLET ORAL
Qty: 90 TABLET | Refills: 0 | Status: SHIPPED | OUTPATIENT
Start: 2018-06-25 | End: 2018-09-22 | Stop reason: SDUPTHER

## 2018-07-02 ENCOUNTER — OFFICE VISIT (OUTPATIENT)
Dept: FAMILY MEDICINE CLINIC | Facility: CLINIC | Age: 58
End: 2018-07-02

## 2018-07-02 VITALS
BODY MASS INDEX: 29.78 KG/M2 | WEIGHT: 208 LBS | OXYGEN SATURATION: 98 % | SYSTOLIC BLOOD PRESSURE: 114 MMHG | DIASTOLIC BLOOD PRESSURE: 68 MMHG | RESPIRATION RATE: 16 BRPM | HEIGHT: 70 IN | HEART RATE: 60 BPM

## 2018-07-02 DIAGNOSIS — I10 BENIGN ESSENTIAL HTN: Primary | ICD-10-CM

## 2018-07-02 PROCEDURE — 99213 OFFICE O/P EST LOW 20 MIN: CPT | Performed by: FAMILY MEDICINE

## 2018-07-02 NOTE — PROGRESS NOTES
Problem List Items Addressed This Visit        Cardiovascular and Mediastinum    Benign essential HTN - Primary    Overview     Fili 7/2/2018  BP is controlled well. He will recheck in six months. He will continue present meds.  Fili 5/12/2017  BP not controlled despite his effort with salt intake. We will begin amlodipine 5 mg today and see him back in one month. SEs discussed. Continue to monitor salt intacke. 4/21/17:  Has been having some borderline numbers, in the 140s and the 80-90's range. He is going to make a strong effort to reduce his salt intake and see me back in one month.                   No Follow-up on file.  There are no Patient Instructions on file for this visit.  Henrry Glover is a 58 y.o. male being seen in our office today for Hypertension                 He  reports that he quit smoking about 37 years ago. His smoking use included Cigarettes. He has a 2.50 pack-year smoking history. He has never used smokeless tobacco. He reports that he does not drink alcohol or use drugs.             HPI Patient is here for follow-up of hypertension. He is exercising and is adherent to a low-salt diet. Patient does not check BP at home.. Patient denies chest pain and dyspnea. Cardiovascular risk factors: advanced age (older than 55 for men, 65 for women), hypertension and male gender. Use of agents associated with hypertension: none. History of target organ damage: none. He is compliant with meds.              The following portions of the patient's history were reviewed and updated as appropriate:PMHroutine: Social history , Allergies, Current Medications, Active Problem List and Health Maintenance            Review of Systems   Constitutional: Negative for activity change, appetite change, chills, fatigue, fever and unexpected weight change.   HENT: Negative for congestion, ear pain, hearing loss, mouth sores, nosebleeds, rhinorrhea and sore throat.    Eyes: Negative for pain and visual  disturbance.   Respiratory: Negative for cough, shortness of breath and wheezing.    Cardiovascular: Negative for chest pain, palpitations and leg swelling.   Gastrointestinal: Negative for abdominal distention, abdominal pain, blood in stool, constipation, diarrhea, nausea and vomiting.   Endocrine: Negative for cold intolerance and heat intolerance.   Genitourinary: Negative for difficulty urinating, discharge, dysuria, frequency, hematuria and urgency.   Musculoskeletal: Negative for back pain and joint swelling.   Skin: Negative for rash and wound.   Neurological: Negative for dizziness, weakness, numbness and headaches.   Hematological: Does not bruise/bleed easily.   Psychiatric/Behavioral: Negative for confusion, dysphoric mood, sleep disturbance and suicidal ideas. The patient is not nervous/anxious.                  BP Readings from Last 1 Encounters:   07/02/18 114/68     Wt Readings from Last 3 Encounters:   07/02/18 94.3 kg (208 lb)   05/23/18 93.9 kg (207 lb)   11/21/17 93.4 kg (206 lb)   Body mass index is 29.84 kg/m².                 Physical Exam   Constitutional: He is oriented to person, place, and time. Vital signs are normal. He appears well-developed and well-nourished. No distress.   HENT:   Head: Normocephalic.   Cardiovascular: Normal rate, regular rhythm and normal heart sounds.    Pulmonary/Chest: Effort normal and breath sounds normal.   Neurological: He is alert and oriented to person, place, and time. Gait normal.   Psychiatric: He has a normal mood and affect. His behavior is normal. Judgment and thought content normal.   Vitals reviewed.

## 2018-09-22 DIAGNOSIS — I10 BENIGN ESSENTIAL HTN: ICD-10-CM

## 2018-09-24 RX ORDER — AMLODIPINE BESYLATE 5 MG/1
TABLET ORAL
Qty: 90 TABLET | Refills: 0 | Status: SHIPPED | OUTPATIENT
Start: 2018-09-24 | End: 2018-12-17 | Stop reason: SDUPTHER

## 2018-09-24 RX ORDER — MONTELUKAST SODIUM 10 MG/1
TABLET ORAL
Qty: 90 TABLET | Refills: 0 | Status: SHIPPED | OUTPATIENT
Start: 2018-09-24 | End: 2018-12-17 | Stop reason: SDUPTHER

## 2018-11-12 ENCOUNTER — TELEPHONE (OUTPATIENT)
Dept: FAMILY MEDICINE CLINIC | Facility: CLINIC | Age: 58
End: 2018-11-12

## 2018-11-12 NOTE — TELEPHONE ENCOUNTER
Pt called in and states he busted like appears to be ingrown hair in his calf , with some pus he states, and two days later he says his leg, ankle and foot is swelling. Denies fever. Pt is been advice to go IC.

## 2018-11-21 ENCOUNTER — OFFICE VISIT (OUTPATIENT)
Dept: FAMILY MEDICINE CLINIC | Facility: CLINIC | Age: 58
End: 2018-11-21

## 2018-11-21 VITALS
TEMPERATURE: 98.6 F | SYSTOLIC BLOOD PRESSURE: 130 MMHG | WEIGHT: 207 LBS | BODY MASS INDEX: 29.63 KG/M2 | DIASTOLIC BLOOD PRESSURE: 70 MMHG | HEIGHT: 70 IN | OXYGEN SATURATION: 98 % | HEART RATE: 86 BPM

## 2018-11-21 DIAGNOSIS — L02.415 ABSCESS OF LEG, RIGHT: Primary | ICD-10-CM

## 2018-11-21 PROCEDURE — 99213 OFFICE O/P EST LOW 20 MIN: CPT | Performed by: NURSE PRACTITIONER

## 2018-11-21 RX ORDER — CEPHALEXIN 500 MG/1
500 CAPSULE ORAL
COMMUNITY
Start: 2018-11-12 | End: 2018-11-22

## 2018-11-21 RX ORDER — NAPROXEN SODIUM 550 MG/1
TABLET ORAL
COMMUNITY
Start: 2018-11-12 | End: 2020-01-09

## 2018-11-21 RX ORDER — SULFAMETHOXAZOLE AND TRIMETHOPRIM 800; 160 MG/1; MG/1
1 TABLET ORAL
COMMUNITY
Start: 2018-11-12 | End: 2018-11-26

## 2018-11-21 NOTE — PROGRESS NOTES
Henrry Glover is a 58 y.o. male.Henrry went to the ER 2 weeks ago with a wound on his lower right leg. His lower leg became swollen. He was diagnosed with Cellulitis and he was given two antibiotics and pain medication. He continues to have swelling that is relieved with elevation. Culture shows MRSA.  Is a  and walks a lot.    Assessment/Plan   Problem List Items Addressed This Visit     None             No Follow-up on file.  There are no Patient Instructions on file for this visit.    No chief complaint on file.    Social History     Tobacco Use   • Smoking status: Former Smoker     Packs/day: 0.25     Years: 10.00     Pack years: 2.50     Types: Cigarettes     Last attempt to quit: 1981     Years since quittin.4   • Smokeless tobacco: Never Used   Substance Use Topics   • Alcohol use: No   • Drug use: No       History of Present Illness     The following portions of the patient's history were reviewed and updated as appropriate:PMHroutine: Social history , Allergies, Current Medications, Active Problem List and Health Maintenance    Review of Systems   Constitutional: Negative for activity change, appetite change and fever.   Respiratory: Negative for shortness of breath.    Cardiovascular: Positive for leg swelling.   Skin: Positive for wound.       Objective   Vitals:    18 1317   Weight: 93.9 kg (207 lb)     Body mass index is 29.7 kg/m².  Physical Exam   Constitutional: He appears well-developed and well-nourished. No distress.   HENT:   Head: Normocephalic and atraumatic.   Musculoskeletal: Normal range of motion. He exhibits edema. He exhibits no tenderness.   Lower leg swelling rt leg below the abscess, no drainage   Neurological: He is alert.   Skin: Skin is warm and dry.   Nursing note and vitals reviewed.    Reviewed Data:  No visits with results within 1 Month(s) from this visit.   Latest known visit with results is:   Office Visit on 2018   Component Date Value Ref  Range Status   • Color 05/23/2018 Carmen  Yellow, Straw, Dark Yellow, Carmen Final   • Clarity, UA 05/23/2018 Clear  Clear Final   • Glucose, UA 05/23/2018 Negative  Negative, 1000 mg/dL (3+) mg/dL Final   • Bilirubin 05/23/2018 Negative  Negative Final   • Ketones, UA 05/23/2018 Negative  Negative Final   • Specific Gravity  05/23/2018 0.010* 1.005 - 1.030 Final   • Blood, UA 05/23/2018 Trace* Negative Final   • pH, Urine 05/23/2018 5.5  5.0 - 8.0 Final   • Protein, POC 05/23/2018 Negative  Negative mg/dL Final   • Urobilinogen, UA 05/23/2018 Normal  Normal Final   • Leukocytes 05/23/2018 Negative  Negative Final   • Nitrite, UA 05/23/2018 Negative  Negative Final   • WBC 05/23/2018 13.43* 4.50 - 10.70 10*3/mm3 Final   • RBC 05/23/2018 3.60* 4.60 - 6.00 10*6/mm3 Final   • Hemoglobin 05/23/2018 11.0* 13.7 - 17.6 g/dL Final   • Hematocrit 05/23/2018 31.0* 40.4 - 52.2 % Final   • MCV 05/23/2018 86.1  79.8 - 96.2 fL Final   • MCH 05/23/2018 30.6  27.0 - 32.7 pg Final   • MCHC 05/23/2018 35.5  32.6 - 36.4 g/dL Final   • RDW 05/23/2018 15.0* 11.5 - 14.5 % Final   • Platelets 05/23/2018 238  140 - 500 10*3/mm3 Final   • Neutrophil Rel % 05/23/2018 58.9  42.7 - 76.0 % Final   • Lymphocyte Rel % 05/23/2018 31.5  19.6 - 45.3 % Final   • Monocyte Rel % 05/23/2018 6.7  5.0 - 12.0 % Final   • Eosinophil Rel % 05/23/2018 2.4  0.3 - 6.2 % Final   • Basophil Rel % 05/23/2018 0.5  0.0 - 1.5 % Final   • Neutrophils Absolute 05/23/2018 7.91  1.90 - 8.10 10*3/mm3 Final   • Lymphocytes Absolute 05/23/2018 4.23  0.90 - 4.80 10*3/mm3 Final   • Monocytes Absolute 05/23/2018 0.90  0.20 - 1.20 10*3/mm3 Final   • Eosinophils Absolute 05/23/2018 0.32  0.00 - 0.70 10*3/mm3 Final   • Basophils Absolute 05/23/2018 0.07  0.00 - 0.20 10*3/mm3 Final   • Immature Granulocyte Rel % 05/23/2018 0.4  0.0 - 0.5 % Final   • Immature Grans Absolute 05/23/2018 0.06* 0.00 - 0.03 10*3/mm3 Final   • nRBC 05/23/2018 0.7* 0.0 - 0.0 /100 WBC Final   • Hemoglobin  A1C 05/23/2018 <4.30* 4.80 - 5.60 % Final    Comment: Hemoglobin A1C Ranges:  Increased Risk for Diabetes  5.7% to 6.4%  Diabetes                     >= 6.5%  Diabetic Goal                < 7.0%     • Specific Gravity, UA 05/23/2018 1.009  1.005 - 1.030 Final   • pH, UA 05/23/2018 6.0  5.0 - 7.5 Final   • Color, UA 05/23/2018 Yellow  Yellow Final   • Appearance, UA 05/23/2018 Clear  Clear Final   • Leukocytes, UA 05/23/2018 Negative  Negative Final   • Protein 05/23/2018 Negative  Negative/Trace Final   • Glucose, UA 05/23/2018 Negative  Negative Final   • Ketones 05/23/2018 Negative  Negative Final   • Blood, UA 05/23/2018 Negative  Negative Final   • Bilirubin, UA 05/23/2018 Negative  Negative Final   • Urobilinogen, UA 05/23/2018 0.2  0.2 - 1.0 mg/dL Final   • Nitrite, UA 05/23/2018 Negative  Negative Final   • Microscopic Examination 05/23/2018 Comment   Final    Microscopic follows if indicated.   • MICROSCOPIC EXAMINATION 05/23/2018 See below:   Final    Microscopic was indicated and was performed.   • Urinalysis Reflex 05/23/2018 Comment   Final    This specimen will not reflex to a Urine Culture.   • Glucose 05/23/2018 99  65 - 99 mg/dL Final   • BUN 05/23/2018 13  8 - 23 mg/dL Final   • Creatinine 05/23/2018 0.91  0.76 - 1.27 mg/dL Final   • eGFR Non  Am 05/23/2018 85  >60 mL/min/1.73 Final   • eGFR African Am 05/23/2018 103  >60 mL/min/1.73 Final   • BUN/Creatinine Ratio 05/23/2018 14.3  7.0 - 25.0 Final   • Sodium 05/23/2018 141  136 - 145 mmol/L Final   • Potassium 05/23/2018 4.2  3.5 - 5.2 mmol/L Final   • Chloride 05/23/2018 101  98 - 107 mmol/L Final   • Total CO2 05/23/2018 25.0  22.0 - 29.0 mmol/L Final   • Calcium 05/23/2018 10.0  8.6 - 10.5 mg/dL Final   • Total Protein 05/23/2018 8.1  6.0 - 8.5 g/dL Final   • Albumin 05/23/2018 4.10  3.50 - 5.20 g/dL Final   • Globulin 05/23/2018 4.0  gm/dL Final   • A/G Ratio 05/23/2018 1.0  g/dL Final   • Total Bilirubin 05/23/2018 1.0  0.1 - 1.2 mg/dL  Final   • Alkaline Phosphatase 05/23/2018 75  39 - 117 U/L Final   • AST (SGOT) 05/23/2018 22  1 - 40 U/L Final   • ALT (SGPT) 05/23/2018 20  1 - 41 U/L Final   • PSA 05/23/2018 0.391  0.000 - 4.000 ng/mL Final   • WBC, UA 05/23/2018 0-5  0 - 5 /hpf Final   • RBC, UA 05/23/2018 0-2  0 - 2 /hpf Final   • Epithelial Cells (non renal) 05/23/2018 None seen  0 - 10 /hpf Final   • Mucus, UA 05/23/2018 Present  Not Estab. /hpf Final   • Bacteria, UA 05/23/2018 Few  None seen/Few /hpf Final     Discussed with Dr. Patrick and she would like him to be followed up at the wound care clinic

## 2018-12-04 ENCOUNTER — APPOINTMENT (OUTPATIENT)
Dept: WOUND CARE | Facility: HOSPITAL | Age: 58
End: 2018-12-04
Attending: SURGERY

## 2018-12-06 ENCOUNTER — OFFICE VISIT (OUTPATIENT)
Dept: WOUND CARE | Facility: HOSPITAL | Age: 58
End: 2018-12-06
Attending: SURGERY

## 2018-12-06 ENCOUNTER — LAB REQUISITION (OUTPATIENT)
Dept: LAB | Facility: HOSPITAL | Age: 58
End: 2018-12-06

## 2018-12-06 DIAGNOSIS — Z00.00 ENCOUNTER FOR GENERAL ADULT MEDICAL EXAMINATION WITHOUT ABNORMAL FINDINGS: ICD-10-CM

## 2018-12-06 DIAGNOSIS — L02.415 CUTANEOUS ABSCESS OF RIGHT LOWER EXTREMITY: ICD-10-CM

## 2018-12-06 PROCEDURE — G0463 HOSPITAL OUTPT CLINIC VISIT: HCPCS

## 2018-12-06 PROCEDURE — 87075 CULTR BACTERIA EXCEPT BLOOD: CPT | Performed by: SURGERY

## 2018-12-06 PROCEDURE — 87205 SMEAR GRAM STAIN: CPT | Performed by: SURGERY

## 2018-12-06 PROCEDURE — 88304 TISSUE EXAM BY PATHOLOGIST: CPT | Performed by: SURGERY

## 2018-12-06 PROCEDURE — 87070 CULTURE OTHR SPECIMN AEROBIC: CPT | Performed by: SURGERY

## 2018-12-09 LAB
BACTERIA SPEC AEROBE CULT: NORMAL
GRAM STN SPEC: NORMAL
GRAM STN SPEC: NORMAL

## 2018-12-10 LAB
CYTO UR: NORMAL
LAB AP CASE REPORT: NORMAL
LAB AP CLINICAL INFORMATION: NORMAL
LAB AP DIAGNOSIS COMMENT: NORMAL
PATH REPORT.FINAL DX SPEC: NORMAL
PATH REPORT.GROSS SPEC: NORMAL

## 2018-12-11 LAB — BACTERIA SPEC ANAEROBE CULT: NORMAL

## 2018-12-13 ENCOUNTER — OFFICE VISIT (OUTPATIENT)
Dept: WOUND CARE | Facility: HOSPITAL | Age: 58
End: 2018-12-13
Attending: SURGERY

## 2018-12-13 PROCEDURE — 97602 WOUND(S) CARE NON-SELECTIVE: CPT

## 2018-12-17 DIAGNOSIS — I10 BENIGN ESSENTIAL HTN: ICD-10-CM

## 2018-12-18 RX ORDER — AMLODIPINE BESYLATE 5 MG/1
TABLET ORAL
Qty: 30 TABLET | Refills: 0 | Status: SHIPPED | OUTPATIENT
Start: 2018-12-18 | End: 2019-01-15 | Stop reason: SDUPTHER

## 2018-12-18 RX ORDER — MONTELUKAST SODIUM 10 MG/1
TABLET ORAL
Qty: 90 TABLET | Refills: 0 | Status: SHIPPED | OUTPATIENT
Start: 2018-12-18 | End: 2019-03-14 | Stop reason: SDUPTHER

## 2018-12-19 ENCOUNTER — CLINICAL SUPPORT (OUTPATIENT)
Dept: FAMILY MEDICINE CLINIC | Facility: CLINIC | Age: 58
End: 2018-12-19

## 2018-12-19 DIAGNOSIS — Z23 NEED FOR VACCINATION: Primary | ICD-10-CM

## 2018-12-19 PROCEDURE — 90471 IMMUNIZATION ADMIN: CPT | Performed by: FAMILY MEDICINE

## 2018-12-19 PROCEDURE — 90632 HEPA VACCINE ADULT IM: CPT | Performed by: FAMILY MEDICINE

## 2018-12-20 ENCOUNTER — OFFICE VISIT (OUTPATIENT)
Dept: WOUND CARE | Facility: HOSPITAL | Age: 58
End: 2018-12-20
Attending: SURGERY

## 2018-12-20 PROCEDURE — 97602 WOUND(S) CARE NON-SELECTIVE: CPT

## 2018-12-27 ENCOUNTER — OFFICE VISIT (OUTPATIENT)
Dept: WOUND CARE | Facility: HOSPITAL | Age: 58
End: 2018-12-27
Attending: SURGERY

## 2018-12-27 PROCEDURE — G0463 HOSPITAL OUTPT CLINIC VISIT: HCPCS

## 2019-01-03 ENCOUNTER — OFFICE VISIT (OUTPATIENT)
Dept: WOUND CARE | Facility: HOSPITAL | Age: 59
End: 2019-01-03
Attending: SURGERY

## 2019-01-03 PROCEDURE — G0463 HOSPITAL OUTPT CLINIC VISIT: HCPCS

## 2019-01-07 ENCOUNTER — RESULTS ENCOUNTER (OUTPATIENT)
Dept: FAMILY MEDICINE CLINIC | Facility: CLINIC | Age: 59
End: 2019-01-07

## 2019-01-07 ENCOUNTER — OFFICE VISIT (OUTPATIENT)
Dept: FAMILY MEDICINE CLINIC | Facility: CLINIC | Age: 59
End: 2019-01-07

## 2019-01-07 VITALS
HEART RATE: 60 BPM | WEIGHT: 210 LBS | RESPIRATION RATE: 20 BRPM | SYSTOLIC BLOOD PRESSURE: 120 MMHG | OXYGEN SATURATION: 98 % | BODY MASS INDEX: 30.06 KG/M2 | DIASTOLIC BLOOD PRESSURE: 74 MMHG | HEIGHT: 70 IN

## 2019-01-07 DIAGNOSIS — Z12.11 COLON CANCER SCREENING: ICD-10-CM

## 2019-01-07 DIAGNOSIS — I10 BENIGN ESSENTIAL HTN: Primary | ICD-10-CM

## 2019-01-07 PROCEDURE — 99213 OFFICE O/P EST LOW 20 MIN: CPT | Performed by: FAMILY MEDICINE

## 2019-01-07 NOTE — PROGRESS NOTES
Problem List Items Addressed This Visit        Cardiovascular and Mediastinum    Benign essential HTN - Primary    Overview     Fili 1/7/2019  BP is controlled well. He will recheck in six months. He will continue present meds.  Fili 5/12/2017  BP not controlled despite his effort with salt intake. We will begin amlodipine 5 mg today and see him back in one month. SEs discussed. Continue to monitor salt intacke. 4/21/17:  Has been having some borderline numbers, in the 140s and the 80-90's range. He is going to make a strong effort to reduce his salt intake and see me back in one month.            Other Visit Diagnoses     Colon cancer screening        Relevant Orders    Cologuard - Stool, Per Rectum         Return in about 6 months (around 7/7/2019).  There are no Patient Instructions on file for this visit.  Henrry Glover is a 58 y.o. male being seen in our office today for Hypertension                 He  reports that he quit smoking about 37 years ago. His smoking use included cigarettes. He has a 2.50 pack-year smoking history. he has never used smokeless tobacco. He reports that he does not drink alcohol or use drugs.             HPI Patient is here for follow-up of hypertension. He is beginning to exercise and is adherent to a low-salt diet. Patient does BP checks at home: It is well controlled when checked.. Patient denies chest pain and dyspnea. Cardiovascular risk factors: advanced age (older than 55 for men, 65 for women), hypertension and male gender. Use of agents associated with hypertension: none. History of target organ damage: none. He is compliant with meds.              The following portions of the patient's history were reviewed and updated as appropriate:PMHroutine: Social history , Allergies, Current Medications, Active Problem List and Health Maintenance            Review of Systems   Constitutional: Negative for activity change, appetite change, chills, fatigue, fever and unexpected  weight change.   HENT: Negative for congestion, ear pain, hearing loss, mouth sores, nosebleeds, rhinorrhea and sore throat.    Eyes: Negative for pain and visual disturbance.   Respiratory: Negative for cough, shortness of breath and wheezing.    Cardiovascular: Negative for chest pain, palpitations and leg swelling.   Gastrointestinal: Negative for abdominal distention, abdominal pain, blood in stool, constipation, diarrhea, nausea and vomiting.   Endocrine: Negative for cold intolerance and heat intolerance.   Genitourinary: Negative for difficulty urinating, discharge, dysuria, frequency, hematuria and urgency.   Musculoskeletal: Negative for back pain and joint swelling.   Skin: Negative for rash and wound.   Neurological: Negative for dizziness, weakness, numbness and headaches.   Hematological: Does not bruise/bleed easily.   Psychiatric/Behavioral: Negative for confusion, dysphoric mood, sleep disturbance and suicidal ideas. The patient is not nervous/anxious.                 BP Readings from Last 1 Encounters:   01/07/19 120/74     Wt Readings from Last 3 Encounters:   01/07/19 95.3 kg (210 lb)   11/21/18 93.9 kg (207 lb)   07/02/18 94.3 kg (208 lb)   Body mass index is 30.13 kg/m².             Physical Exam   Constitutional: He is oriented to person, place, and time. Vital signs are normal. He appears well-developed and well-nourished. No distress.   HENT:   Head: Normocephalic.   Cardiovascular: Normal rate, regular rhythm and normal heart sounds.   Pulmonary/Chest: Effort normal and breath sounds normal.   Neurological: He is alert and oriented to person, place, and time. Gait normal.   Psychiatric: He has a normal mood and affect. His behavior is normal. Judgment and thought content normal.   Vitals reviewed.

## 2019-01-10 RX ORDER — TRIAMCINOLONE ACETONIDE 0.25 MG/G
CREAM TOPICAL
Qty: 240 G | Refills: 0 | Status: SHIPPED | OUTPATIENT
Start: 2019-01-10 | End: 2020-01-02

## 2019-01-15 DIAGNOSIS — I10 BENIGN ESSENTIAL HTN: ICD-10-CM

## 2019-01-15 RX ORDER — AMLODIPINE BESYLATE 5 MG/1
TABLET ORAL
Qty: 90 TABLET | Refills: 0 | Status: SHIPPED | OUTPATIENT
Start: 2019-01-15 | End: 2019-04-18 | Stop reason: SDUPTHER

## 2019-01-17 ENCOUNTER — OFFICE VISIT (OUTPATIENT)
Dept: WOUND CARE | Facility: HOSPITAL | Age: 59
End: 2019-01-17
Attending: SURGERY

## 2019-01-17 PROCEDURE — G0463 HOSPITAL OUTPT CLINIC VISIT: HCPCS

## 2019-03-15 RX ORDER — MONTELUKAST SODIUM 10 MG/1
TABLET ORAL
Qty: 90 TABLET | Refills: 0 | Status: SHIPPED | OUTPATIENT
Start: 2019-03-15 | End: 2019-06-10 | Stop reason: SDUPTHER

## 2019-04-18 DIAGNOSIS — I10 BENIGN ESSENTIAL HTN: ICD-10-CM

## 2019-04-18 RX ORDER — AMLODIPINE BESYLATE 5 MG/1
TABLET ORAL
Qty: 90 TABLET | Refills: 0 | Status: SHIPPED | OUTPATIENT
Start: 2019-04-18 | End: 2019-07-15 | Stop reason: SDUPTHER

## 2019-06-10 RX ORDER — MONTELUKAST SODIUM 10 MG/1
TABLET ORAL
Qty: 90 TABLET | Refills: 0 | Status: SHIPPED | OUTPATIENT
Start: 2019-06-10 | End: 2019-09-08 | Stop reason: SDUPTHER

## 2019-07-15 DIAGNOSIS — I10 BENIGN ESSENTIAL HTN: ICD-10-CM

## 2019-07-15 RX ORDER — AMLODIPINE BESYLATE 5 MG/1
TABLET ORAL
Qty: 90 TABLET | Refills: 0 | Status: SHIPPED | OUTPATIENT
Start: 2019-07-15 | End: 2019-10-05 | Stop reason: SDUPTHER

## 2019-09-09 RX ORDER — MONTELUKAST SODIUM 10 MG/1
TABLET ORAL
Qty: 90 TABLET | Refills: 2 | Status: SHIPPED | OUTPATIENT
Start: 2019-09-09 | End: 2020-05-26

## 2019-10-05 DIAGNOSIS — I10 BENIGN ESSENTIAL HTN: ICD-10-CM

## 2019-10-07 RX ORDER — AMLODIPINE BESYLATE 5 MG/1
TABLET ORAL
Qty: 90 TABLET | Refills: 0 | Status: SHIPPED | OUTPATIENT
Start: 2019-10-07 | End: 2020-01-03 | Stop reason: SDUPTHER

## 2020-01-02 DIAGNOSIS — I10 BENIGN ESSENTIAL HTN: ICD-10-CM

## 2020-01-02 RX ORDER — TRIAMCINOLONE ACETONIDE 0.25 MG/G
CREAM TOPICAL
Qty: 240 G | Refills: 0 | Status: SHIPPED | OUTPATIENT
Start: 2020-01-02 | End: 2020-05-26

## 2020-01-03 RX ORDER — AMLODIPINE BESYLATE 5 MG/1
TABLET ORAL
Qty: 90 TABLET | Refills: 0 | Status: SHIPPED | OUTPATIENT
Start: 2020-01-03 | End: 2020-03-31

## 2020-01-09 ENCOUNTER — OFFICE VISIT (OUTPATIENT)
Dept: FAMILY MEDICINE CLINIC | Facility: CLINIC | Age: 60
End: 2020-01-09

## 2020-01-09 VITALS
HEIGHT: 70 IN | OXYGEN SATURATION: 98 % | BODY MASS INDEX: 26.48 KG/M2 | RESPIRATION RATE: 14 BRPM | WEIGHT: 185 LBS | SYSTOLIC BLOOD PRESSURE: 122 MMHG | DIASTOLIC BLOOD PRESSURE: 70 MMHG | HEART RATE: 61 BPM

## 2020-01-09 DIAGNOSIS — R07.2 PRECORDIAL PAIN: ICD-10-CM

## 2020-01-09 DIAGNOSIS — K21.00 GASTROESOPHAGEAL REFLUX DISEASE WITH ESOPHAGITIS: ICD-10-CM

## 2020-01-09 DIAGNOSIS — I10 BENIGN ESSENTIAL HTN: Primary | ICD-10-CM

## 2020-01-09 PROBLEM — N40.0 BPH (BENIGN PROSTATIC HYPERPLASIA): Status: ACTIVE | Noted: 2019-04-05

## 2020-01-09 PROBLEM — K74.00 HEPATIC FIBROSIS: Status: ACTIVE | Noted: 2020-01-09

## 2020-01-09 PROBLEM — Z96.9 RETAINED ORTHOPEDIC HARDWARE: Status: ACTIVE | Noted: 2020-01-09

## 2020-01-09 PROBLEM — D3A.8 PRIMARY PANCREATIC NEUROENDOCRINE TUMOR: Status: ACTIVE | Noted: 2020-01-09

## 2020-01-09 LAB
ALBUMIN SERPL-MCNC: 4.4 G/DL (ref 3.5–5.2)
ALBUMIN/GLOB SERPL: 1.1 G/DL
ALP SERPL-CCNC: 96 U/L (ref 39–117)
ALT SERPL-CCNC: 16 U/L (ref 1–41)
AMYLASE SERPL-CCNC: 68 U/L (ref 28–100)
AST SERPL-CCNC: 20 U/L (ref 1–40)
BILIRUB SERPL-MCNC: 1.2 MG/DL (ref 0.2–1.2)
BUN SERPL-MCNC: 7 MG/DL (ref 8–23)
BUN/CREAT SERPL: 9.5 (ref 7–25)
CALCIUM SERPL-MCNC: 9.8 MG/DL (ref 8.6–10.5)
CHLORIDE SERPL-SCNC: 105 MMOL/L (ref 98–107)
CHOLEST SERPL-MCNC: 98 MG/DL (ref 0–200)
CO2 SERPL-SCNC: 26 MMOL/L (ref 22–29)
CREAT SERPL-MCNC: 0.74 MG/DL (ref 0.76–1.27)
GLOBULIN SER CALC-MCNC: 3.9 GM/DL
GLUCOSE SERPL-MCNC: 84 MG/DL (ref 65–99)
HDLC SERPL-MCNC: 45 MG/DL (ref 40–60)
LDLC SERPL CALC-MCNC: 38 MG/DL (ref 0–100)
LIPASE SERPL-CCNC: 13 U/L (ref 13–60)
POTASSIUM SERPL-SCNC: 4.5 MMOL/L (ref 3.5–5.2)
PROT SERPL-MCNC: 8.3 G/DL (ref 6–8.5)
SODIUM SERPL-SCNC: 143 MMOL/L (ref 136–145)
TRIGL SERPL-MCNC: 76 MG/DL (ref 0–150)
VLDLC SERPL CALC-MCNC: 15.2 MG/DL

## 2020-01-09 PROCEDURE — 99213 OFFICE O/P EST LOW 20 MIN: CPT | Performed by: FAMILY MEDICINE

## 2020-01-09 RX ORDER — FAMOTIDINE 40 MG/1
40 TABLET, FILM COATED ORAL DAILY
Qty: 90 TABLET | Refills: 3 | Status: SHIPPED | OUTPATIENT
Start: 2020-01-09 | End: 2021-04-23

## 2020-01-09 NOTE — PROGRESS NOTES
ASSESSMENT AND PLAN    Problem List Items Addressed This Visit        Cardiovascular and Mediastinum    Benign essential HTN - Primary    Overview     Fili 1/7/2019  BP is controlled well. He will recheck in six months. He will continue present meds.         Relevant Orders    Lipid panel    Comprehensive Metabolic Panel       Digestive    Gastroesophageal reflux disease    Current Assessment & Plan     Fili 1/9/2020  Worsening of his GERD lately -- due to eating probably -- ribs, [place] breakfast burrito and corndogs          Relevant Medications    famotidine (PEPCID) 40 MG tablet      Other Visit Diagnoses     Precordial pain        Relevant Orders    Comprehensive Metabolic Panel    Lipase    Amylase             Return in about 6 months (around 7/9/2020).  Patient was given instructions and counseling regarding his condition or for health maintenance advice. Please see specific information pulled into the AVS by me.          SUBJECTIVE  Henrry Glover is a 59 y.o. male being seen in our office today for Heartburn and Hypertension               Social History  He  reports that he quit smoking about 38 years ago. His smoking use included cigarettes. He has a 2.50 pack-year smoking history. He has never used smokeless tobacco. He reports that he does not drink alcohol or use drugs.    History of the Present Illness   HPI Having some heartburn precordial assoc with belching with relieves it. Used antacids and it resolved. Started two days ago. Unrelated to activity but had eaten recently. Ribs make it worse. 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 Has lost 25 lbs since his surgery.     Patient is here for follow-up of hypertension. He is doing some exercising and is adherent to a low-salt diet. Patient does check BP occasionally.. Patient denies chest pain and dyspnea. Cardiovascular risk factors: advanced age (older than 55 for men, 65 for women), hypertension and male  gender. Use of agents associated with hypertension: none. History of target organ damage: none. He is compliant with meds.   Significant Past History  The following portions of the patient's history were reviewed and updated as appropriate:PMHroutine: Social history , Past Medical History, Surgical history , Allergies, Current Medications, Active Problem List and Health Maintenance    Review of Systems   Constitutional: Negative for activity change, appetite change, chills, fatigue, fever and unexpected weight change.   HENT: Negative for congestion, ear pain, hearing loss, mouth sores, nosebleeds, rhinorrhea and sore throat.    Eyes: Negative for pain and visual disturbance.   Respiratory: Negative for cough, shortness of breath and wheezing.    Cardiovascular: Negative for chest pain, palpitations and leg swelling.   Gastrointestinal: Negative for abdominal distention, abdominal pain, blood in stool, constipation, diarrhea, nausea and vomiting.   Endocrine: Negative for cold intolerance and heat intolerance.   Genitourinary: Negative for difficulty urinating, discharge, dysuria, frequency, hematuria and urgency.   Musculoskeletal: Negative for back pain and joint swelling.   Skin: Negative for rash and wound.   Neurological: Negative for dizziness, weakness, numbness and headaches.   Hematological: Does not bruise/bleed easily.   Psychiatric/Behavioral: Negative for confusion, dysphoric mood, sleep disturbance and suicidal ideas. The patient is not nervous/anxious.    I have reviewed the ROS as documented by the MA. Henny Patrick MD      OBJECTIVE   Vital Signs          BP Readings from Last 1 Encounters:   01/09/20 122/70     Wt Readings from Last 3 Encounters:   01/09/20 83.9 kg (185 lb)   01/07/19 95.3 kg (210 lb)   11/21/18 93.9 kg (207 lb)   Body mass index is 26.54 kg/m².     Physical Exam   Constitutional: He is oriented to person, place, and time. Vital signs are normal. He appears well-developed and  well-nourished. No distress.   HENT:   Head: Normocephalic.   Cardiovascular: Normal rate and regular rhythm.   Murmur (1/6 GUILHERME RUSB and LLSB) heard.  Pulmonary/Chest: Effort normal and breath sounds normal.   Neurological: He is alert and oriented to person, place, and time. Gait normal.   Psychiatric: He has a normal mood and affect. His behavior is normal. Judgment and thought content normal.   Vitals reviewed.    Data Reviewed     Reviewed treatment from \[facility]\"" for neuroendocrine tumor.

## 2020-02-13 ENCOUNTER — OFFICE VISIT (OUTPATIENT)
Dept: FAMILY MEDICINE CLINIC | Facility: CLINIC | Age: 60
End: 2020-02-13

## 2020-02-13 VITALS
WEIGHT: 183 LBS | HEIGHT: 70 IN | HEART RATE: 86 BPM | OXYGEN SATURATION: 98 % | RESPIRATION RATE: 14 BRPM | SYSTOLIC BLOOD PRESSURE: 122 MMHG | BODY MASS INDEX: 26.2 KG/M2 | DIASTOLIC BLOOD PRESSURE: 82 MMHG

## 2020-02-13 DIAGNOSIS — H92.02 EARACHE ON LEFT: Primary | ICD-10-CM

## 2020-02-13 PROCEDURE — 99212 OFFICE O/P EST SF 10 MIN: CPT | Performed by: FAMILY MEDICINE

## 2020-02-13 NOTE — PROGRESS NOTES
ASSESSMENT AND PLAN     Problem List Items Addressed This Visit     None      Visit Diagnoses     Earache on left - resolved    -  Primary          Return if symptoms worsen or fail to improve.  Patient was given instructions and counseling regarding his condition or for health maintenance advice. Please see specific information pulled into the AVS if appropriate.          SUBJECTIVE  Henrry Glover is a 59 y.o. male being seen in our office today for Earache               Social History  He  reports that he quit smoking about 38 years ago. His smoking use included cigarettes. He has a 2.50 pack-year smoking history. He has never used smokeless tobacco. He reports that he does not drink alcohol or use drugs.    History of the Present Illness   HPI Was having some earache and some dizziness which is much better today. Then it was bothering his throat for a couple of days. Earache went on for a couple of day. No cold symptoms. Not a .     Significant Past History  The following portions of the patient's history were reviewed and updated as appropriate:PMHroutine: Social history , Allergies, Current Medications, Active Problem List and Health Maintenance    Review of Systems   Constitutional: Negative for activity change, appetite change, chills, fatigue, fever and unexpected weight change.   HENT: Positive for ear pain (left earache). Negative for congestion, hearing loss, mouth sores, nosebleeds, rhinorrhea and sore throat.    Eyes: Negative for pain and visual disturbance.   Respiratory: Negative for cough, shortness of breath and wheezing.    Cardiovascular: Negative for chest pain, palpitations and leg swelling.   Gastrointestinal: Negative for abdominal distention, abdominal pain, blood in stool, constipation, diarrhea, nausea and vomiting.   Endocrine: Negative for cold intolerance and heat intolerance.   Genitourinary: Negative for difficulty urinating, discharge, dysuria, frequency, hematuria and urgency.    Musculoskeletal: Negative for back pain and joint swelling.   Skin: Negative for rash and wound.   Neurological: Negative for dizziness, weakness, numbness and headaches.   Hematological: Does not bruise/bleed easily.   Psychiatric/Behavioral: Negative for confusion, dysphoric mood, sleep disturbance and suicidal ideas. The patient is not nervous/anxious.    I have reviewed the ROS as documented by the MA. Henny Patrick MD      OBJECTIVE  Vital Signs          BP Readings from Last 1 Encounters:   02/13/20 122/82     Wt Readings from Last 3 Encounters:   02/13/20 83 kg (183 lb)   01/09/20 83.9 kg (185 lb)   01/07/19 95.3 kg (210 lb)   Body mass index is 26.26 kg/m².     Physical Exam   Constitutional: He appears well-developed and well-nourished.   HENT:   Right Ear: Tympanic membrane, external ear and ear canal normal.   Left Ear: External ear and ear canal normal.   Dull TMs bilaterally, retracted on the right.    Psychiatric: He has a normal mood and affect. His behavior is normal. Judgment and thought content normal.   Vitals reviewed.    Data Reviewed

## 2020-03-31 DIAGNOSIS — I10 BENIGN ESSENTIAL HTN: ICD-10-CM

## 2020-03-31 RX ORDER — AMLODIPINE BESYLATE 5 MG/1
TABLET ORAL
Qty: 90 TABLET | Refills: 1 | Status: SHIPPED | OUTPATIENT
Start: 2020-03-31 | End: 2020-09-15

## 2020-05-23 DIAGNOSIS — L20.9 ATOPIC DERMATITIS, UNSPECIFIED TYPE: ICD-10-CM

## 2020-05-23 DIAGNOSIS — J45.909 ASTHMA, UNSPECIFIED ASTHMA SEVERITY, UNSPECIFIED WHETHER COMPLICATED, UNSPECIFIED WHETHER PERSISTENT: Primary | ICD-10-CM

## 2020-05-26 RX ORDER — TRIAMCINOLONE ACETONIDE 0.25 MG/G
CREAM TOPICAL
Qty: 240 G | Refills: 0 | Status: SHIPPED | OUTPATIENT
Start: 2020-05-26 | End: 2021-03-26

## 2020-05-26 RX ORDER — MONTELUKAST SODIUM 10 MG/1
TABLET ORAL
Qty: 90 TABLET | Refills: 2 | Status: SHIPPED | OUTPATIENT
Start: 2020-05-26 | End: 2021-02-14 | Stop reason: SDUPTHER

## 2020-07-09 ENCOUNTER — TELEMEDICINE (OUTPATIENT)
Dept: FAMILY MEDICINE CLINIC | Facility: CLINIC | Age: 60
End: 2020-07-09

## 2020-07-09 VITALS — BODY MASS INDEX: 26.63 KG/M2 | WEIGHT: 186 LBS | HEIGHT: 70 IN

## 2020-07-09 DIAGNOSIS — I10 BENIGN ESSENTIAL HTN: Primary | ICD-10-CM

## 2020-07-09 PROCEDURE — 99212 OFFICE O/P EST SF 10 MIN: CPT | Performed by: FAMILY MEDICINE

## 2020-07-09 NOTE — PROGRESS NOTES
ASSESSMENT AND PLAN     Problem List Items Addressed This Visit        Cardiovascular and Mediastinum    Benign essential HTN - Primary    Overview     Madeleinecanelo 7/9/2020  BP is controlled well. He will recheck in six months. He will continue present meds.             Return in about 6 months (around 1/9/2021).  Patient was given instructions and counseling regarding his condition or for health maintenance advice. Please see specific information pulled into the AVS if appropriate.        SUBJECTIVE   Henrry Glover is a 60 y.o. male being seen today for Hypertension               Social History  He  reports that he quit smoking about 39 years ago. His smoking use included cigarettes. He has a 2.50 pack-year smoking history. He has never used smokeless tobacco. He reports that he does not drink alcohol or use drugs.    History of the Present Illness   HPI He sees Dr. Anthony at the end of July. He is staying safe. BP has been good. Walks a lot regularly.    Significant Past History  The following portions of the patient's history were reviewed and updated as appropriate:PMHroutine: Social history , Allergies, Current Medications, Active Problem List and Health Maintenance    Review of Systems   Constitutional: Negative for activity change, fatigue and fever.   Respiratory: Negative for cough and shortness of breath.    Cardiovascular: Negative for chest pain.   Psychiatric/Behavioral: Negative for dysphoric mood and sleep disturbance. The patient is not nervous/anxious.    I have reviewed the ROS as documented by the MA. Henny Patrick MD      OBJECTIVE  Vital Signs          BP Readings from Last 1 Encounters:   02/13/20 122/82     Wt Readings from Last 3 Encounters:   07/09/20 84.4 kg (186 lb)   02/13/20 83 kg (183 lb)   01/09/20 83.9 kg (185 lb)   Body mass index is 26.69 kg/m².     Physical Exam   Constitutional: He appears well-developed and well-nourished.   Psychiatric: He has a normal mood and affect. His  behavior is normal. Judgment and thought content normal.     Data Reviewed

## 2020-09-15 DIAGNOSIS — I10 BENIGN ESSENTIAL HTN: ICD-10-CM

## 2020-09-15 RX ORDER — AMLODIPINE BESYLATE 5 MG/1
TABLET ORAL
Qty: 90 TABLET | Refills: 1 | Status: SHIPPED | OUTPATIENT
Start: 2020-09-15 | End: 2021-03-11

## 2021-02-12 DIAGNOSIS — J45.909 ASTHMA, UNSPECIFIED ASTHMA SEVERITY, UNSPECIFIED WHETHER COMPLICATED, UNSPECIFIED WHETHER PERSISTENT: ICD-10-CM

## 2021-02-14 RX ORDER — MONTELUKAST SODIUM 10 MG/1
TABLET ORAL
Qty: 90 TABLET | Refills: 2 | Status: SHIPPED | OUTPATIENT
Start: 2021-02-14 | End: 2021-11-11

## 2021-03-11 DIAGNOSIS — I10 BENIGN ESSENTIAL HTN: ICD-10-CM

## 2021-03-11 RX ORDER — AMLODIPINE BESYLATE 5 MG/1
5 TABLET ORAL DAILY
Qty: 30 TABLET | Refills: 0 | Status: SHIPPED | OUTPATIENT
Start: 2021-03-11 | End: 2021-04-27

## 2021-03-26 DIAGNOSIS — L20.9 ATOPIC DERMATITIS, UNSPECIFIED TYPE: ICD-10-CM

## 2021-03-26 RX ORDER — TRIAMCINOLONE ACETONIDE 0.25 MG/G
CREAM TOPICAL
Qty: 240 G | Refills: 0 | Status: SHIPPED | OUTPATIENT
Start: 2021-03-26 | End: 2021-10-28

## 2021-04-14 DIAGNOSIS — I10 BENIGN ESSENTIAL HTN: ICD-10-CM

## 2021-04-14 RX ORDER — AMLODIPINE BESYLATE 5 MG/1
TABLET ORAL
Qty: 90 TABLET | OUTPATIENT
Start: 2021-04-14

## 2021-04-23 ENCOUNTER — OFFICE VISIT (OUTPATIENT)
Dept: FAMILY MEDICINE CLINIC | Facility: CLINIC | Age: 61
End: 2021-04-23

## 2021-04-23 VITALS
DIASTOLIC BLOOD PRESSURE: 80 MMHG | OXYGEN SATURATION: 98 % | TEMPERATURE: 96.8 F | HEART RATE: 72 BPM | WEIGHT: 187 LBS | SYSTOLIC BLOOD PRESSURE: 138 MMHG | BODY MASS INDEX: 26.77 KG/M2 | HEIGHT: 70 IN | RESPIRATION RATE: 16 BRPM

## 2021-04-23 DIAGNOSIS — M20.42 HAMMER TOE OF LEFT FOOT: Primary | ICD-10-CM

## 2021-04-23 DIAGNOSIS — Z12.11 ENCOUNTER FOR SCREENING FOR MALIGNANT NEOPLASM OF COLON: ICD-10-CM

## 2021-04-23 PROCEDURE — 99213 OFFICE O/P EST LOW 20 MIN: CPT | Performed by: FAMILY MEDICINE

## 2021-04-23 NOTE — PROGRESS NOTES
Assessment and Plan:     Problem List Items Addressed This Visit     None      Visit Diagnoses     Encounter for screening for malignant neoplasm of colon    -  Primary    Relevant Orders    Cologuard - Stool, Per Rectum        No follow-ups on file.        Patient was given instructions and counseling regarding his condition or for health maintenance advice. Please see specific information pulled into the AVS if appropriate.        Henrry is a 61 y.o. being seen today for Hypertension and Pain (left foot pain)   Subjective   History of the Present Illness   Answers for HPI/ROS submitted by the patient on 4/23/2021  What is the primary reason for your visit?: Other  Please describe your symptoms.: Pain in left toe.  Have you had these symptoms before?: Yes  How long have you been having these symptoms?: Greater than 2 weeks  Please list any medications you are currently taking for this condition.: None  Please describe any probable cause for these symptoms. : Don't know.  Notes callus on the left medial surface of the right toe second toe.  It is nontender to touch but certain positions and tight shoes can cause very painful episodes.  He has not treated it with anything.  It has been going on for couple of months.    Social History  He  reports that he quit smoking about 39 years ago. His smoking use included cigarettes. He has a 2.50 pack-year smoking history. He has never used smokeless tobacco. He reports that he does not drink alcohol and does not use drugs.  Objective   Vital Signs          BP Readings from Last 1 Encounters:   04/23/21 138/80     Wt Readings from Last 3 Encounters:   04/23/21 84.8 kg (187 lb)   07/09/20 84.4 kg (186 lb)   02/13/20 83 kg (183 lb)   Body mass index is 26.83 kg/m².     Physical Exam  Vitals reviewed.   Constitutional:       Appearance: Normal appearance. He is well-developed.   Skin:     General: Skin is warm and dry.      Comments: Good pulses in the left anterior tibialis.   He has significant callus formation on the medial surface of a hammertoe second toe.   Neurological:      Mental Status: He is alert.   Psychiatric:         Behavior: Behavior normal.         Thought Content: Thought content normal.         Judgment: Judgment normal.

## 2021-04-26 DIAGNOSIS — I10 BENIGN ESSENTIAL HTN: ICD-10-CM

## 2021-04-27 RX ORDER — AMLODIPINE BESYLATE 5 MG/1
TABLET ORAL
Qty: 90 TABLET | Refills: 1 | Status: SHIPPED | OUTPATIENT
Start: 2021-04-27 | End: 2021-10-21

## 2021-10-21 DIAGNOSIS — I10 BENIGN ESSENTIAL HTN: ICD-10-CM

## 2021-10-21 RX ORDER — AMLODIPINE BESYLATE 5 MG/1
TABLET ORAL
Qty: 30 TABLET | Refills: 0 | Status: SHIPPED | OUTPATIENT
Start: 2021-10-21 | End: 2021-10-22 | Stop reason: SDUPTHER

## 2021-10-22 ENCOUNTER — OFFICE VISIT (OUTPATIENT)
Dept: FAMILY MEDICINE CLINIC | Facility: CLINIC | Age: 61
End: 2021-10-22

## 2021-10-22 VITALS
HEIGHT: 70 IN | DIASTOLIC BLOOD PRESSURE: 78 MMHG | SYSTOLIC BLOOD PRESSURE: 120 MMHG | WEIGHT: 183 LBS | OXYGEN SATURATION: 98 % | BODY MASS INDEX: 26.2 KG/M2 | RESPIRATION RATE: 16 BRPM | HEART RATE: 70 BPM

## 2021-10-22 DIAGNOSIS — I10 BENIGN ESSENTIAL HTN: ICD-10-CM

## 2021-10-22 DIAGNOSIS — F41.9 ANXIETY: Primary | ICD-10-CM

## 2021-10-22 PROBLEM — Z97.8 PRESENCE OF OTHER SPECIFIED DEVICES: Status: RESOLVED | Noted: 2020-01-09 | Resolved: 2021-10-22

## 2021-10-22 PROBLEM — D18.03 HEMANGIOMA OF LIVER: Status: ACTIVE | Noted: 2020-08-02

## 2021-10-22 PROBLEM — Z86.2 HISTORY OF SICKLE CELL ANEMIA: Status: ACTIVE | Noted: 2021-04-05

## 2021-10-22 PROBLEM — Z97.8 PRESENCE OF OTHER SPECIFIED DEVICES: Status: ACTIVE | Noted: 2020-01-09

## 2021-10-22 PROCEDURE — 99214 OFFICE O/P EST MOD 30 MIN: CPT | Performed by: FAMILY MEDICINE

## 2021-10-22 RX ORDER — LORAZEPAM 0.5 MG/1
0.5 TABLET ORAL EVERY 8 HOURS PRN
Qty: 25 TABLET | Refills: 0 | Status: SHIPPED | OUTPATIENT
Start: 2021-10-22 | End: 2021-10-22 | Stop reason: SDUPTHER

## 2021-10-22 RX ORDER — AMLODIPINE BESYLATE 5 MG/1
5 TABLET ORAL DAILY
Qty: 90 TABLET | Refills: 1 | Status: SHIPPED | OUTPATIENT
Start: 2021-10-22 | End: 2021-11-19

## 2021-10-22 RX ORDER — LORAZEPAM 0.5 MG/1
0.5 TABLET ORAL EVERY 8 HOURS PRN
Qty: 25 TABLET | Refills: 0 | Status: SHIPPED | OUTPATIENT
Start: 2021-10-22 | End: 2022-06-03

## 2021-10-22 NOTE — PROGRESS NOTES
Assessment and Plan     Problem List Items Addressed This Visit        Cardiac and Vasculature    Benign essential HTN    Overview     Fili 10/22/2021  BP is controlled well. He will recheck in six months. He will continue present meds.         Relevant Medications    amLODIPine (NORVASC) 5 MG tablet       Mental Health    Anxiety - Primary    Relevant Medications    LORazepam (Ativan) 0.5 MG tablet        Return in about 6 months (around 4/22/2022).    Patient was given instructions and counseling regarding his condition or for health maintenance advice. Please see specific information pulled into the AVS if appropriate.        Henrry is a 61 y.o. being seen today for  Pain (right hand finger pain) and Hypertension   Subjective   History of the Present Illness   He is having some knotting in his back and anxiousness.  This is related to his wife moving to [place] to be closer to her family.  They are not  but he is having some money issues with that change.  He sometimes awakens anxious and feels knots in his back.  He did use appropriately Xanax in the past.  He understands the issues with dependency but has not had any history of substance abuse.    Also having some hypoesthesia of the right middle finger when he touches things.  This is been going on for some time it is maybe a little worse lately.  No injury to the area no mass on the finger  Social History  He  reports that he quit smoking about 40 years ago. His smoking use included cigarettes. He has a 2.50 pack-year smoking history. He has never used smokeless tobacco. He reports that he does not drink alcohol and does not use drugs.  Objective   Vital Signs        BP Readings from Last 1 Encounters:   10/22/21 120/78     Wt Readings from Last 3 Encounters:   10/22/21 83 kg (183 lb)   04/23/21 84.8 kg (187 lb)   07/09/20 84.4 kg (186 lb)   Body mass index is 26.26 kg/m².     Physical Exam  Vitals reviewed.   Constitutional:        Appearance: Normal appearance.   HENT:      Head:      Comments: Mask in place.  Cardiovascular:      Rate and Rhythm: Normal rate and regular rhythm.      Heart sounds: Murmur (1/6 GUILHERME in RUSB) heard.       Pulmonary:      Effort: Pulmonary effort is normal.      Breath sounds: Normal breath sounds. No wheezing.   Musculoskeletal:         General: No deformity.      Comments: No mass.  No tenderness, erythema or swelling of the right middle finger distally.   Neurological:      Mental Status: He is alert and oriented to person, place, and time.   Psychiatric:         Mood and Affect: Mood normal.         Behavior: Behavior normal.         Thought Content: Thought content normal.         Judgment: Judgment normal.

## 2021-10-28 DIAGNOSIS — L20.9 ATOPIC DERMATITIS, UNSPECIFIED TYPE: ICD-10-CM

## 2021-10-28 RX ORDER — TRIAMCINOLONE ACETONIDE 0.25 MG/G
CREAM TOPICAL
Qty: 240 G | Refills: 0 | Status: SHIPPED | OUTPATIENT
Start: 2021-10-28 | End: 2023-02-24

## 2021-11-11 DIAGNOSIS — J45.909 ASTHMA, UNSPECIFIED ASTHMA SEVERITY, UNSPECIFIED WHETHER COMPLICATED, UNSPECIFIED WHETHER PERSISTENT: ICD-10-CM

## 2021-11-11 RX ORDER — MONTELUKAST SODIUM 10 MG/1
TABLET ORAL
Qty: 90 TABLET | Refills: 2 | Status: SHIPPED | OUTPATIENT
Start: 2021-11-11 | End: 2022-05-16

## 2021-11-19 DIAGNOSIS — I10 BENIGN ESSENTIAL HTN: ICD-10-CM

## 2021-11-19 RX ORDER — AMLODIPINE BESYLATE 5 MG/1
5 TABLET ORAL DAILY
Qty: 90 TABLET | Refills: 1 | Status: SHIPPED | OUTPATIENT
Start: 2021-11-19 | End: 2022-07-01 | Stop reason: SDUPTHER

## 2021-12-30 ENCOUNTER — OFFICE VISIT (OUTPATIENT)
Dept: FAMILY MEDICINE CLINIC | Facility: CLINIC | Age: 61
End: 2021-12-30

## 2021-12-30 VITALS
DIASTOLIC BLOOD PRESSURE: 70 MMHG | OXYGEN SATURATION: 96 % | SYSTOLIC BLOOD PRESSURE: 112 MMHG | RESPIRATION RATE: 15 BRPM | WEIGHT: 180 LBS | BODY MASS INDEX: 25.83 KG/M2 | HEART RATE: 74 BPM

## 2021-12-30 DIAGNOSIS — H10.33 ACUTE BACTERIAL CONJUNCTIVITIS OF BOTH EYES: Primary | ICD-10-CM

## 2021-12-30 PROCEDURE — 99213 OFFICE O/P EST LOW 20 MIN: CPT | Performed by: NURSE PRACTITIONER

## 2021-12-30 RX ORDER — POLYMYXIN B SULFATE AND TRIMETHOPRIM 1; 10000 MG/ML; [USP'U]/ML
1 SOLUTION OPHTHALMIC 3 TIMES DAILY
Qty: 10 ML | Refills: 0 | Status: SHIPPED | OUTPATIENT
Start: 2021-12-30 | End: 2022-07-01

## 2021-12-30 NOTE — PROGRESS NOTES
Subjective   Henrry Glover is a 61 y.o. male.   itchy eyes    History of Present Illness   Started with some bilateral eye drainage about 3 days ago. He started using some polytrim eye drops that his wife had and it has cleared up a little but they ran out of eye drop.     The following portions of the patient's history were reviewed and updated as appropriate: allergies, current medications, past family history, past medical history, past social history, past surgical history and problem list.    Review of Systems   Constitutional: Negative for activity change, appetite change and fever.   Respiratory: Negative for cough and shortness of breath.    Cardiovascular: Negative for chest pain and leg swelling.   Skin: Negative for rash.       Objective   Physical Exam  Vitals and nursing note reviewed.   Constitutional:       Appearance: Normal appearance.   HENT:      Head: Normocephalic and atraumatic.   Eyes:      Extraocular Movements: Extraocular movements intact.      Pupils: Pupils are equal, round, and reactive to light.      Comments: Mild conjunctival injection   Neurological:      Mental Status: He is alert.           Assessment/Plan   Problem List Items Addressed This Visit     None      Visit Diagnoses     Acute bacterial conjunctivitis of both eyes    -  Primary    Relevant Medications    trimethoprim-polymyxin b (Polytrim) 35323-4.1 UNIT/ML-% ophthalmic solution        Good hand washing       Return if symptoms worsen or fail to improve. Answers for HPI/ROS submitted by the patient on 12/29/2021  What is the primary reason for your visit?: Other  Please describe your symptoms.: Eye problems--Redness, itches, uncomfortable to touch.  Have you had these symptoms before?: Yes  How long have you been having these symptoms?: 5-7 days  Please describe any probable cause for these symptoms. : Maybe my Sickle-cell.

## 2021-12-30 NOTE — PATIENT INSTRUCTIONS
Allergic Conjunctivitis, Adult  Allergic conjunctivitis is inflammation of the conjunctiva. The conjunctiva is the thin, clear membrane that covers the white part of the eye and the inner surface of the eyelid. In this condition:  · The blood vessels in the conjunctiva become irritated and swell.  · The eyes become red or pink and feel itchy.  Allergic conjunctivitis cannot be spread from person to person. This condition can develop at any age and may be outgrown.  What are the causes?  This condition is caused by allergens. These are things that can cause an allergic reaction in some people but not in other people. Common allergens include:  · Outdoor allergens, such as:  ? Pollen, including pollen from grass and weeds.  ? Mold spores.  ? Car fumes.  · Indoor allergens, such as:  ? Dust.  ? Smoke.  ? Mold spores.  ? Proteins in a pet's urine, saliva, or dander.  What increases the risk?  You may be more likely to develop this condition if you have a family history of these things:  · Allergies.  · Conditions caused by being exposed to allergens, such as:  ? Allergic rhinitis. This is an allergic reaction that affects the nose.  ? Bronchial asthma. This condition affects the large airways in the lungs and makes breathing difficult.  ? Atopic dermatitis (eczema). This is inflammation of the skin that is long-term (chronic).  What are the signs or symptoms?  Symptoms of this condition include eyes that are:  · Itchy.  · Red.  · Watery.  · Puffy.  Your eyes may also:  · Sting or burn.  · Have clear fluid draining from them.  · Have thick mucus discharge and pain (vernal conjunctivitis).  How is this diagnosed?  This condition may be diagnosed by:  · Your medical history.  · A physical exam.  · Tests of the fluid draining from your eyes to rule out other causes.  · Other tests to confirm the diagnosis, including:  ? Testing for allergies. The skin may be pricked with a tiny needle. The pricked area is then exposed to  small amounts of allergens.  ? Testing for other eye conditions. Tests may include:  § Blood tests.  § Tissue scrapings from your eyelid. The tissue is then checked under a microscope.  How is this treated?  This condition may be treated with:  · Cold, wet cloths (cold compresses) to soothe itching and swelling.  · Washing the face to remove allergens.  · Eye drops. These may be prescription or over-the-counter. You may need to try different types to see which one works best for you, such as:  ? Eye drops that block the allergic reaction (antihistamine).  ? Eye drops that reduce swelling and irritation (anti-inflammatory).  ? Steroid eye drops, which may be given if other treatments have not worked (vernal conjunctivitis).  · Oral antihistamine medicines. These are medicines taken by mouth to lessen your allergic reaction. You may need these if eye drops do not help or are difficult to use.  Follow these instructions at home:  Eye care  · Apply a clean, cold compress to your eyes for 10-20 minutes, 3-4 times a day.  · Do not touch or rub your eyes.  · Do not wear contact lenses until the inflammation is gone. Wear glasses instead.  · Do not wear eye makeup until the inflammation is gone.  General instructions  · Avoid known allergens whenever possible.  · Take or apply over-the-counter and prescription medicines only as told by your health care provider. These include any eye drops.  · Drink enough fluid to keep your urine pale yellow.  · Keep all follow-up visits as told by your health care provider. This is important.  Contact a health care provider if:  · Your symptoms get worse or do not get better with treatment.  · You have mild eye pain.  · You become sensitive to light.  · You have spots or blisters on your eyes.  · You have pus draining from your eyes.  · You have a fever.  Get help right away if:  · You have redness, swelling, or other symptoms in only one eye.  · Your vision is blurred or you have other  vision changes.  · You have severe eye pain.  Summary  · Allergic conjunctivitis is inflammation of the clear membrane that covers the white part of the eye and the inner surface of the eyelid.  · Take or apply over-the-counter and prescription medicines only as told by your health care provider. These include eye drops.  · Do not touch or rub your eyes.  · Contact a health care provider if your symptoms get worse or do not get better with treatment.  This information is not intended to replace advice given to you by your health care provider. Make sure you discuss any questions you have with your health care provider.  Document Revised: 11/09/2020 Document Reviewed: 11/09/2020  Elsevier Patient Education © 2021 [employer]

## 2022-03-09 ENCOUNTER — OFFICE VISIT (OUTPATIENT)
Dept: FAMILY MEDICINE CLINIC | Facility: CLINIC | Age: 62
End: 2022-03-09

## 2022-03-09 VITALS
SYSTOLIC BLOOD PRESSURE: 122 MMHG | HEIGHT: 70 IN | DIASTOLIC BLOOD PRESSURE: 70 MMHG | HEART RATE: 67 BPM | WEIGHT: 177 LBS | RESPIRATION RATE: 16 BRPM | BODY MASS INDEX: 25.34 KG/M2 | OXYGEN SATURATION: 97 %

## 2022-03-09 DIAGNOSIS — M25.511 ACUTE PAIN OF BOTH SHOULDERS: ICD-10-CM

## 2022-03-09 DIAGNOSIS — M25.512 ACUTE PAIN OF BOTH SHOULDERS: ICD-10-CM

## 2022-03-09 DIAGNOSIS — F41.8 DEPRESSION WITH ANXIETY: Primary | ICD-10-CM

## 2022-03-09 PROCEDURE — 99214 OFFICE O/P EST MOD 30 MIN: CPT | Performed by: FAMILY MEDICINE

## 2022-03-09 RX ORDER — MELOXICAM 15 MG/1
15 TABLET ORAL DAILY
Qty: 20 TABLET | Refills: 0 | Status: SHIPPED | OUTPATIENT
Start: 2022-03-09 | End: 2022-03-28

## 2022-03-09 RX ORDER — ESCITALOPRAM OXALATE 10 MG/1
10 TABLET ORAL DAILY
Qty: 90 TABLET | Refills: 0 | Status: SHIPPED | OUTPATIENT
Start: 2022-03-09 | End: 2022-06-06

## 2022-03-09 NOTE — PROGRESS NOTES
"Assessment and Plan     Problem List Items Addressed This Visit    None     Visit Diagnoses     Depression with anxiety    -  Primary    Relevant Medications    escitalopram (Lexapro) 10 MG tablet        Return in about 6 weeks (around 4/20/2022).    Patient was given instructions and counseling regarding his condition or for health maintenance advice. Please see specific information pulled into the AVS if appropriate.        Henrry is a 61 y.o. being seen today for  Pain (Bilateral shoulder with left being worse)   Subjective   History of the Present Illness   Left shoulder greater than right at first and now the right is worse. When he goes to move them they hurt, he fell on some ice about a month ago, hit his rearend and doesn't remember injuring his shoulder, mobility is OK, can raise them OK. Notices in the morning when he is getting up and getting dressed or showering. No other joints bothering him. Occasional it hurts to sleep on the shoulder but moving helped that. Hasn't taking anything for it.     His anxiety is getting worse as well.  He has taken only a few of the lorazepam.  He does not want to get \"hooked\" on them.  He thinks there is some depression as well.  He is not suicidal.  He does have a lot of recurrent thoughts that are negative.  He would be willing to try a medication if it would help him feel more normal.  Social History  He  reports that he quit smoking about 40 years ago. His smoking use included cigarettes. He has a 2.50 pack-year smoking history. He has never used smokeless tobacco. He reports that he does not drink alcohol and does not use drugs.  Objective   Vital Signs        BP Readings from Last 1 Encounters:   03/09/22 122/70     Wt Readings from Last 3 Encounters:   03/09/22 80.3 kg (177 lb)   12/30/21 81.6 kg (180 lb)   10/22/21 83 kg (183 lb)   Body mass index is 25.4 kg/m².     Physical Exam  Vitals reviewed.   Constitutional:       Appearance: Normal appearance. He is " well-developed.   Musculoskeletal:         General: No swelling or tenderness. Normal range of motion.   Neurological:      Mental Status: He is alert.   Psychiatric:         Behavior: Behavior normal.         Thought Content: Thought content normal.         Judgment: Judgment normal.

## 2022-03-27 DIAGNOSIS — M25.512 ACUTE PAIN OF BOTH SHOULDERS: ICD-10-CM

## 2022-03-27 DIAGNOSIS — M25.511 ACUTE PAIN OF BOTH SHOULDERS: ICD-10-CM

## 2022-03-28 RX ORDER — MELOXICAM 15 MG/1
15 TABLET ORAL DAILY
Qty: 20 TABLET | Refills: 0 | Status: SHIPPED | OUTPATIENT
Start: 2022-03-28 | End: 2022-04-18

## 2022-04-17 DIAGNOSIS — M25.512 ACUTE PAIN OF BOTH SHOULDERS: ICD-10-CM

## 2022-04-17 DIAGNOSIS — M25.511 ACUTE PAIN OF BOTH SHOULDERS: ICD-10-CM

## 2022-04-18 ENCOUNTER — OFFICE VISIT (OUTPATIENT)
Dept: FAMILY MEDICINE CLINIC | Facility: CLINIC | Age: 62
End: 2022-04-18

## 2022-04-18 VITALS
SYSTOLIC BLOOD PRESSURE: 120 MMHG | HEIGHT: 70 IN | DIASTOLIC BLOOD PRESSURE: 70 MMHG | WEIGHT: 181 LBS | OXYGEN SATURATION: 99 % | HEART RATE: 61 BPM | BODY MASS INDEX: 25.91 KG/M2 | RESPIRATION RATE: 16 BRPM

## 2022-04-18 DIAGNOSIS — M25.512 ACUTE PAIN OF BOTH SHOULDERS: Primary | ICD-10-CM

## 2022-04-18 DIAGNOSIS — M25.511 ACUTE PAIN OF BOTH SHOULDERS: Primary | ICD-10-CM

## 2022-04-18 PROCEDURE — 99213 OFFICE O/P EST LOW 20 MIN: CPT | Performed by: FAMILY MEDICINE

## 2022-04-18 RX ORDER — MELOXICAM 15 MG/1
15 TABLET ORAL DAILY
Qty: 20 TABLET | Refills: 0 | Status: SHIPPED | OUTPATIENT
Start: 2022-04-18 | End: 2022-05-05

## 2022-04-18 NOTE — PROGRESS NOTES
Assessment and Plan     Problem List Items Addressed This Visit    None     Visit Diagnoses     Acute pain of both shoulders    -  Primary    Relevant Orders    Ambulatory Referral to Orthopedic Surgery        Return in about 4 weeks (around 5/16/2022) for Recheck.    Patient was given instructions and counseling regarding his condition or for health maintenance advice. Please see specific information pulled into the AVS if appropriate.        Henrry is a 62 y.o. being seen today for  Depression and Anxiety   Subjective   History of the Present Illness   The meloxicam didn't help a lot. When he first gets up in the am he doesn't really notice the pain. Left greater than right. Worse when he raises his arms. Sometimes it feels like he needs to pop his arm back in place.    He did not take the lexapro because he was afraid that he would become dependant on it.   Social History  He  reports that he quit smoking about 40 years ago. His smoking use included cigarettes. He has a 2.50 pack-year smoking history. He has never used smokeless tobacco. He reports that he does not drink alcohol and does not use drugs.  Objective   Vital Signs        BP Readings from Last 1 Encounters:   04/18/22 120/70     Wt Readings from Last 3 Encounters:   04/18/22 82.1 kg (181 lb)   03/09/22 80.3 kg (177 lb)   12/30/21 81.6 kg (180 lb)   Body mass index is 25.97 kg/m².     Physical Exam  Vitals reviewed.   Constitutional:       Appearance: Normal appearance. He is well-developed.   Neurological:      Mental Status: He is alert.   Psychiatric:         Behavior: Behavior normal.         Thought Content: Thought content normal.         Judgment: Judgment normal.

## 2022-05-05 DIAGNOSIS — M25.511 ACUTE PAIN OF BOTH SHOULDERS: ICD-10-CM

## 2022-05-05 DIAGNOSIS — M25.512 ACUTE PAIN OF BOTH SHOULDERS: ICD-10-CM

## 2022-05-05 RX ORDER — MELOXICAM 15 MG/1
15 TABLET ORAL DAILY
Qty: 20 TABLET | Refills: 0 | Status: SHIPPED | OUTPATIENT
Start: 2022-05-05 | End: 2022-05-23

## 2022-05-14 DIAGNOSIS — J45.909 ASTHMA, UNSPECIFIED ASTHMA SEVERITY, UNSPECIFIED WHETHER COMPLICATED, UNSPECIFIED WHETHER PERSISTENT: ICD-10-CM

## 2022-05-16 RX ORDER — MONTELUKAST SODIUM 10 MG/1
TABLET ORAL
Qty: 90 TABLET | Refills: 2 | Status: SHIPPED | OUTPATIENT
Start: 2022-05-16

## 2022-05-23 DIAGNOSIS — M25.511 ACUTE PAIN OF BOTH SHOULDERS: ICD-10-CM

## 2022-05-23 DIAGNOSIS — M25.512 ACUTE PAIN OF BOTH SHOULDERS: ICD-10-CM

## 2022-05-23 RX ORDER — MELOXICAM 15 MG/1
15 TABLET ORAL DAILY
Qty: 20 TABLET | Refills: 0 | Status: SHIPPED | OUTPATIENT
Start: 2022-05-23 | End: 2022-06-14

## 2022-05-31 ENCOUNTER — TELEPHONE (OUTPATIENT)
Dept: FAMILY MEDICINE CLINIC | Facility: CLINIC | Age: 62
End: 2022-05-31

## 2022-05-31 ENCOUNTER — TRANSCRIBE ORDERS (OUTPATIENT)
Dept: ADMINISTRATIVE | Facility: HOSPITAL | Age: 62
End: 2022-05-31

## 2022-05-31 DIAGNOSIS — U07.1 CLINICAL DIAGNOSIS OF SEVERE ACUTE RESPIRATORY SYNDROME CORONAVIRUS 2 (SARS-COV-2) DISEASE: Primary | ICD-10-CM

## 2022-05-31 RX ORDER — DIPHENHYDRAMINE HCL 50 MG
50 CAPSULE ORAL ONCE AS NEEDED
Status: CANCELLED | OUTPATIENT
Start: 2022-05-31

## 2022-05-31 RX ORDER — SODIUM CHLORIDE 9 MG/ML
30 INJECTION, SOLUTION INTRAVENOUS ONCE
Status: CANCELLED | OUTPATIENT
Start: 2022-05-31 | End: 2022-05-31

## 2022-05-31 RX ORDER — EPINEPHRINE 1 MG/ML
0.3 INJECTION, SOLUTION, CONCENTRATE INTRAVENOUS AS NEEDED
Status: CANCELLED | OUTPATIENT
Start: 2022-05-31

## 2022-05-31 RX ORDER — BEBTELOVIMAB 87.5 MG/ML
175 INJECTION, SOLUTION INTRAVENOUS ONCE
Status: CANCELLED | OUTPATIENT
Start: 2022-05-31

## 2022-05-31 RX ORDER — METHYLPREDNISOLONE SODIUM SUCCINATE 125 MG/2ML
125 INJECTION, POWDER, LYOPHILIZED, FOR SOLUTION INTRAMUSCULAR; INTRAVENOUS AS NEEDED
Status: CANCELLED | OUTPATIENT
Start: 2022-05-31

## 2022-05-31 RX ORDER — DIPHENHYDRAMINE HYDROCHLORIDE 50 MG/ML
50 INJECTION INTRAMUSCULAR; INTRAVENOUS ONCE AS NEEDED
Status: CANCELLED | OUTPATIENT
Start: 2022-05-31

## 2022-05-31 NOTE — TELEPHONE ENCOUNTER
"    Caller: Henrry Glover    Relationship to patient: Self    Best call back number: [phone]    Date of exposure: N/A    Date of positive COVID19 test: AT HOME TEST Sunday 5/29    Date if possible COVID19 exposure: N/A    COVID19 symptoms: FATIGUE/\"FELT BAD\"    Date of initial quarantine: 5/29    Additional information or concerns: HE IS STILL HAVING FATIGUE BUT IS FEELING MUCH BETTER. PATIENT HAS A HX OF PNEUMONIA     What is the patients preferred pharmacy: [id number] [address] AT [facility] - [phone] [facility] [phone]   [phone]  "

## 2022-05-31 NOTE — TELEPHONE ENCOUNTER
Spoke to patient he would like to have antibody infusion done will ask Dr Bob to sign form and fax over to see if he can get in within the 7 day time period.

## 2022-06-01 ENCOUNTER — HOSPITAL ENCOUNTER (OUTPATIENT)
Dept: INFUSION THERAPY | Facility: HOSPITAL | Age: 62
Setting detail: INFUSION SERIES
Discharge: HOME | End: 2022-06-01

## 2022-06-01 VITALS
RESPIRATION RATE: 18 BRPM | DIASTOLIC BLOOD PRESSURE: 71 MMHG | SYSTOLIC BLOOD PRESSURE: 118 MMHG | HEART RATE: 59 BPM | TEMPERATURE: 98.4 F | OXYGEN SATURATION: 97 %

## 2022-06-01 DIAGNOSIS — U07.1 COVID-19 VIRUS DETECTED: Primary | ICD-10-CM

## 2022-06-01 PROCEDURE — 96374 THER/PROPH/DIAG INJ IV PUSH: CPT

## 2022-06-01 PROCEDURE — 25010000002 INJECTION, BEBTELOVIMAB, 175 MG: Performed by: FAMILY MEDICINE

## 2022-06-01 PROCEDURE — M0222 HC INJECTION BEBTELOVIMAB: HCPCS | Performed by: FAMILY MEDICINE

## 2022-06-01 RX ORDER — DIPHENHYDRAMINE HYDROCHLORIDE 50 MG/ML
50 INJECTION INTRAMUSCULAR; INTRAVENOUS ONCE AS NEEDED
Status: CANCELLED | OUTPATIENT
Start: 2022-06-01

## 2022-06-01 RX ORDER — METHYLPREDNISOLONE SODIUM SUCCINATE 125 MG/2ML
125 INJECTION, POWDER, LYOPHILIZED, FOR SOLUTION INTRAMUSCULAR; INTRAVENOUS AS NEEDED
Status: CANCELLED | OUTPATIENT
Start: 2022-06-01

## 2022-06-01 RX ORDER — SODIUM CHLORIDE 9 MG/ML
30 INJECTION, SOLUTION INTRAVENOUS ONCE
Status: DISCONTINUED | OUTPATIENT
Start: 2022-06-01 | End: 2022-06-03 | Stop reason: HOSPADM

## 2022-06-01 RX ORDER — BEBTELOVIMAB 87.5 MG/ML
175 INJECTION, SOLUTION INTRAVENOUS ONCE
Status: COMPLETED | OUTPATIENT
Start: 2022-06-01 | End: 2022-06-01

## 2022-06-01 RX ORDER — SODIUM CHLORIDE 9 MG/ML
30 INJECTION, SOLUTION INTRAVENOUS ONCE
Status: CANCELLED | OUTPATIENT
Start: 2022-06-01 | End: 2022-06-01

## 2022-06-01 RX ORDER — DIPHENHYDRAMINE HCL 25 MG
50 CAPSULE ORAL ONCE AS NEEDED
Status: CANCELLED | OUTPATIENT
Start: 2022-06-01

## 2022-06-01 RX ORDER — DIPHENHYDRAMINE HYDROCHLORIDE 50 MG/ML
50 INJECTION INTRAMUSCULAR; INTRAVENOUS ONCE AS NEEDED
Status: DISCONTINUED | OUTPATIENT
Start: 2022-06-01 | End: 2022-06-03 | Stop reason: HOSPADM

## 2022-06-01 RX ORDER — BEBTELOVIMAB 87.5 MG/ML
175 INJECTION, SOLUTION INTRAVENOUS ONCE
Status: CANCELLED | OUTPATIENT
Start: 2022-06-01

## 2022-06-01 RX ORDER — DIPHENHYDRAMINE HCL 25 MG
50 CAPSULE ORAL ONCE AS NEEDED
Status: DISCONTINUED | OUTPATIENT
Start: 2022-06-01 | End: 2022-06-03 | Stop reason: HOSPADM

## 2022-06-01 RX ORDER — METHYLPREDNISOLONE SODIUM SUCCINATE 125 MG/2ML
125 INJECTION, POWDER, LYOPHILIZED, FOR SOLUTION INTRAMUSCULAR; INTRAVENOUS AS NEEDED
Status: DISCONTINUED | OUTPATIENT
Start: 2022-06-01 | End: 2022-06-03 | Stop reason: HOSPADM

## 2022-06-01 RX ADMIN — BEBTELOVIMAB 175 MG: 87.5 INJECTION, SOLUTION INTRAVENOUS at 10:05

## 2022-06-03 DIAGNOSIS — F41.9 ANXIETY: ICD-10-CM

## 2022-06-03 RX ORDER — LORAZEPAM 0.5 MG/1
TABLET ORAL
Qty: 25 TABLET | Refills: 0 | Status: SHIPPED | OUTPATIENT
Start: 2022-06-03 | End: 2022-09-01

## 2022-06-05 DIAGNOSIS — F41.8 DEPRESSION WITH ANXIETY: ICD-10-CM

## 2022-06-06 RX ORDER — ESCITALOPRAM OXALATE 10 MG/1
10 TABLET ORAL DAILY
Qty: 90 TABLET | Refills: 1 | Status: SHIPPED | OUTPATIENT
Start: 2022-06-06 | End: 2022-07-01 | Stop reason: SINTOL

## 2022-06-14 DIAGNOSIS — M25.511 ACUTE PAIN OF BOTH SHOULDERS: ICD-10-CM

## 2022-06-14 DIAGNOSIS — M25.512 ACUTE PAIN OF BOTH SHOULDERS: ICD-10-CM

## 2022-06-14 RX ORDER — MELOXICAM 15 MG/1
15 TABLET ORAL DAILY
Qty: 30 TABLET | Refills: 2 | Status: SHIPPED | OUTPATIENT
Start: 2022-06-14 | End: 2022-09-17

## 2022-07-01 ENCOUNTER — OFFICE VISIT (OUTPATIENT)
Dept: FAMILY MEDICINE CLINIC | Facility: CLINIC | Age: 62
End: 2022-07-01

## 2022-07-01 VITALS
HEIGHT: 70 IN | WEIGHT: 177 LBS | RESPIRATION RATE: 18 BRPM | BODY MASS INDEX: 25.34 KG/M2 | DIASTOLIC BLOOD PRESSURE: 70 MMHG | OXYGEN SATURATION: 98 % | SYSTOLIC BLOOD PRESSURE: 112 MMHG | HEART RATE: 68 BPM

## 2022-07-01 DIAGNOSIS — F41.9 ANXIETY: Primary | ICD-10-CM

## 2022-07-01 DIAGNOSIS — Z86.69 HISTORY OF SLEEP APNEA: ICD-10-CM

## 2022-07-01 DIAGNOSIS — Z12.11 ENCOUNTER FOR SCREENING FOR MALIGNANT NEOPLASM OF COLON: ICD-10-CM

## 2022-07-01 DIAGNOSIS — I10 BENIGN ESSENTIAL HTN: ICD-10-CM

## 2022-07-01 PROBLEM — U07.1 COVID-19 VIRUS DETECTED: Status: RESOLVED | Noted: 2022-05-31 | Resolved: 2022-07-01

## 2022-07-01 PROCEDURE — 99214 OFFICE O/P EST MOD 30 MIN: CPT | Performed by: FAMILY MEDICINE

## 2022-07-01 RX ORDER — AMLODIPINE BESYLATE 5 MG/1
5 TABLET ORAL DAILY
Qty: 90 TABLET | Refills: 1 | Status: SHIPPED | OUTPATIENT
Start: 2022-07-01

## 2022-07-01 NOTE — PROGRESS NOTES
Assessment and Plan     Problem List Items Addressed This Visit        Cardiac and Vasculature    Benign essential HTN    Overview     Fili 7/1/2022  BP is controlled well. He will recheck in six months. He will continue present meds.           Relevant Medications    amLODIPine (NORVASC) 5 MG tablet       Mental Health    Anxiety - Primary    Overview     Fili 7/1/2022  This is better lately with only a couple of lorazepam's.  He did not do well on the Lexapro so will discontinue that.             Other Visit Diagnoses     History of sleep apnea        Relevant Orders    Ambulatory Referral to Sleep Medicine    Encounter for screening for malignant neoplasm of colon        Relevant Orders    Cologuard - Stool, Per Rectum        Return in about 6 months (around 1/1/2023) for Annual physical.    Patient was given instructions and counseling regarding his condition or for health maintenance advice. Please see specific information pulled into the AVS if appropriate.        Henrry is a 62 y.o. being seen today for  Anxiety and Depression   Subjective   History of the Present Illness   He tried the lexapro and it made his anxiety worse. Lately he's been doing better and he has not needed any of the acute medication so he feels like he is doing better.     Right shoulder got better following an injection, actually in both shoulders, but following an episode of COVID about a month ago his right shoulder began to hurt again.  He does have another appointment with the orthopedist but not until September.  He has been using meloxicam and was told to not use Advil or Aleve at the same time but that he can use Tylenol.    He apparently was tested 7 years ago for sleep apnea and attempted to use what sounds like a nasal prong solution.  It was uncomfortable.  He never tried a mask and now he would like to be retested and if still found to have apnea he will use the mask.  Social History  He  reports that he quit smoking  about 41 years ago. His smoking use included cigarettes. He has a 2.50 pack-year smoking history. He has never used smokeless tobacco. He reports that he does not drink alcohol and does not use drugs.  Objective   Vital Signs        BP Readings from Last 1 Encounters:   07/01/22 112/70     Wt Readings from Last 3 Encounters:   07/01/22 80.3 kg (177 lb)   04/24/22 81.6 kg (180 lb)   04/18/22 82.1 kg (181 lb)   Body mass index is 25.4 kg/m².     Physical Exam  Vitals reviewed.   Constitutional:       Appearance: Normal appearance. He is well-developed.   Neurological:      Mental Status: He is alert.   Psychiatric:         Behavior: Behavior normal.         Thought Content: Thought content normal.         Judgment: Judgment normal.

## 2022-08-31 DIAGNOSIS — F41.9 ANXIETY: ICD-10-CM

## 2022-09-02 RX ORDER — LORAZEPAM 0.5 MG/1
TABLET ORAL
Qty: 25 TABLET | Refills: 0 | Status: SHIPPED | OUTPATIENT
Start: 2022-09-02 | End: 2022-11-30

## 2022-09-17 DIAGNOSIS — M25.511 ACUTE PAIN OF BOTH SHOULDERS: ICD-10-CM

## 2022-09-17 DIAGNOSIS — M25.512 ACUTE PAIN OF BOTH SHOULDERS: ICD-10-CM

## 2022-09-17 RX ORDER — MELOXICAM 15 MG/1
15 TABLET ORAL DAILY
Qty: 30 TABLET | Refills: 2 | Status: SHIPPED | OUTPATIENT
Start: 2022-09-17 | End: 2022-12-22

## 2022-09-23 ENCOUNTER — APPOINTMENT (OUTPATIENT)
Dept: SLEEP MEDICINE | Facility: HOSPITAL | Age: 62
End: 2022-09-23

## 2022-10-17 ENCOUNTER — TELEPHONE (OUTPATIENT)
Dept: FAMILY MEDICINE CLINIC | Facility: CLINIC | Age: 62
End: 2022-10-17

## 2022-10-17 NOTE — TELEPHONE ENCOUNTER
Caller: Henrry Glover    Relationship: Self    Best call back number: [phone]    What medication are you requesting: POLYMYXCIN B SULFATE TRIMETHOPRIM OPHTHALMIC SOLUTION    What are your current symptoms: LEFT EYE ITCHY, RED, IRRITATED, DISCHARGE, BELIEVES IT IS PINK EYE    How long have you been experiencing symptoms: ABOUT A WEEK    Have you had these symptoms before:    [x] Yes  [] No    Have you been treated for these symptoms before:   [x] Yes  [] No    If a prescription is needed, what is your preferred pharmacy and phone number: [id number] [address] AT [facility] - [phone] [facility] [phone] [facility]     Additional notes: PATIENT HAD SOME MEDICATION LEFT OVER FROM A PREVIOUS PRESCRIPTION AND STARTED USING IT AND IT DID HELP, BUT HE ONLY HAD ABOUT A 3 DAY SUPPLY AND SYMPTOMS STARTED BACK UP YESTERDAY. WHEN HE WOKE UP THIS MORNING IT WAS RED AND IRRITATED AND HAD SOME DISCHARGE.    PLEASE ADVISE

## 2022-10-18 ENCOUNTER — OFFICE VISIT (OUTPATIENT)
Dept: FAMILY MEDICINE CLINIC | Facility: CLINIC | Age: 62
End: 2022-10-18

## 2022-10-18 VITALS
HEART RATE: 62 BPM | SYSTOLIC BLOOD PRESSURE: 122 MMHG | WEIGHT: 185 LBS | BODY MASS INDEX: 26.48 KG/M2 | OXYGEN SATURATION: 97 % | RESPIRATION RATE: 16 BRPM | HEIGHT: 70 IN | DIASTOLIC BLOOD PRESSURE: 70 MMHG

## 2022-10-18 DIAGNOSIS — Z23 NEED FOR VACCINATION: Primary | ICD-10-CM

## 2022-10-18 DIAGNOSIS — H10.023 PINK EYE DISEASE OF BOTH EYES: ICD-10-CM

## 2022-10-18 PROCEDURE — 99213 OFFICE O/P EST LOW 20 MIN: CPT | Performed by: NURSE PRACTITIONER

## 2022-10-18 PROCEDURE — 0124A COVID-19 (PFIZER) BIVALENT BOOSTER 12+YRS: CPT | Performed by: NURSE PRACTITIONER

## 2022-10-18 PROCEDURE — 91312 COVID-19 (PFIZER) BIVALENT BOOSTER 12+YRS: CPT | Performed by: NURSE PRACTITIONER

## 2022-10-18 RX ORDER — POLYMYXIN B SULFATE AND TRIMETHOPRIM 1; 10000 MG/ML; [USP'U]/ML
1 SOLUTION OPHTHALMIC EVERY 6 HOURS
Qty: 10 ML | Refills: 0 | Status: SHIPPED | OUTPATIENT
Start: 2022-10-18 | End: 2022-12-05

## 2022-10-18 NOTE — PROGRESS NOTES
Subjective   Henrry Glover is a 62 y.o. male.     History of Present Illness   Pt here w/ c/o redness and drainage in his eyes, Lt worse  Than Rt.  Symptoms started a week ago and he has some Polytrim at home that was previously prescribed, which he used until they ran out a day or 2.  He has been using otc allergy eye drops. Patient denies any pain in his eyes, but admits to discomfort. He reports that his eyes are blurry at times.     The following portions of the patient's history were reviewed and updated as appropriate: allergies, current medications, past family history, past medical history, past social history, past surgical history and problem list.    Review of Systems   Constitutional: Negative for chills, fatigue and fever.   Eyes: Positive for discharge and redness. Negative for blurred vision, double vision, photophobia and visual disturbance.   Respiratory: Negative for cough, chest tightness, shortness of breath and wheezing.    Cardiovascular: Negative for chest pain, palpitations and leg swelling.   Neurological: Negative for dizziness and headache.   Hematological: Negative.    Psychiatric/Behavioral: Negative.  Negative for sleep disturbance.       Objective   Physical Exam  Vitals and nursing note reviewed.   Constitutional:       Appearance: He is well-developed.   HENT:      Head: Normocephalic and atraumatic.   Eyes:      General: Lids are normal.      Conjunctiva/sclera:      Right eye: Right conjunctiva is injected. No chemosis, exudate or hemorrhage.     Left eye: Left conjunctiva is injected. No chemosis, exudate or hemorrhage.     Pupils: Pupils are equal, round, and reactive to light.   Cardiovascular:      Rate and Rhythm: Normal rate and regular rhythm.      Heart sounds: Normal heart sounds. No murmur heard.  Pulmonary:      Effort: Pulmonary effort is normal.      Breath sounds: Normal breath sounds.   Neurological:      Mental Status: He is alert and oriented to person, place, and  time.   Psychiatric:         Behavior: Behavior normal.         Thought Content: Thought content normal.         Judgment: Judgment normal.         Vitals:    10/18/22 1517   BP: 122/70   Pulse: 62   Resp: 16   SpO2: 97%     Body mass index is 26.54 kg/m².      Procedures    Assessment & Plan   Problems Addressed this Visit    None  Visit Diagnoses     Need for vaccination    -  Primary    Relevant Orders    COVID-19 Bivalent Booster (Pfizer) 12+yrs    Pink eye disease of both eyes        Relevant Medications    trimethoprim-polymyxin b (POLYTRIM) 14576-1.1 UNIT/ML-% ophthalmic solution      Diagnoses       Codes Comments    Need for vaccination    -  Primary ICD-10-CM: Z23  ICD-9-CM: V05.9     Pink eye disease of both eyes     ICD-10-CM: H10.023  ICD-9-CM: 372.03           Polytrim as directed   Covid bivalent booster       Return if symptoms worsen or fail to improve.  Answers for HPI/ROS submitted by the patient on 10/18/2022  What is the primary reason for your visit?: Other  Please describe your symptoms.: Pink eye.  Have you had these symptoms before?: Yes  How long have you been having these symptoms?: 5-7 days  Please list any medications you are currently taking for this condition.: None

## 2022-10-21 ENCOUNTER — OFFICE VISIT (OUTPATIENT)
Dept: FAMILY MEDICINE CLINIC | Facility: CLINIC | Age: 62
End: 2022-10-21

## 2022-10-21 VITALS
HEART RATE: 68 BPM | OXYGEN SATURATION: 99 % | WEIGHT: 185 LBS | SYSTOLIC BLOOD PRESSURE: 120 MMHG | HEIGHT: 70 IN | DIASTOLIC BLOOD PRESSURE: 72 MMHG | RESPIRATION RATE: 18 BRPM | BODY MASS INDEX: 26.48 KG/M2

## 2022-10-21 DIAGNOSIS — K12.1 STOMATITIS AND MUCOSITIS: Primary | ICD-10-CM

## 2022-10-21 DIAGNOSIS — K12.30 STOMATITIS AND MUCOSITIS: Primary | ICD-10-CM

## 2022-10-21 PROCEDURE — 99212 OFFICE O/P EST SF 10 MIN: CPT | Performed by: FAMILY MEDICINE

## 2022-10-21 NOTE — PROGRESS NOTES
Assessment and Plan     Problem List Items Addressed This Visit    None  Visit Diagnoses     Stomatitis and mucositis    -  Primary    Probably viral.  Can use nystatin swish and spit.        No follow-ups on file.    Patient was given instructions and counseling regarding his condition or for health maintenance advice. Please see specific information pulled into the AVS if appropriate.        Henrry is a 62 y.o. being seen today for  Thrush   Subjective   History of the Present Illness   Patient had treatment for conjunctivitis last week.  Now having a enlarged and coated tongue.  He is wondering about thrush.  It is not painful.  Social History  He  reports that he quit smoking about 41 years ago. His smoking use included cigarettes. He has a 2.50 pack-year smoking history. He has never used smokeless tobacco. He reports that he does not drink alcohol and does not use drugs.  Objective   Vital Signs        BP Readings from Last 1 Encounters:   10/21/22 120/72     Wt Readings from Last 3 Encounters:   10/21/22 83.9 kg (185 lb)   10/18/22 83.9 kg (185 lb)   07/01/22 80.3 kg (177 lb)   Body mass index is 26.54 kg/m².     Physical Exam  Vitals reviewed.   Constitutional:       Appearance: Normal appearance. He is well-developed.   HENT:      Mouth/Throat:     Neurological:      Mental Status: He is alert.   Psychiatric:         Behavior: Behavior normal.         Thought Content: Thought content normal.         Judgment: Judgment normal.

## 2022-11-29 DIAGNOSIS — F41.9 ANXIETY: ICD-10-CM

## 2022-11-30 RX ORDER — LORAZEPAM 0.5 MG/1
TABLET ORAL
Qty: 25 TABLET | Refills: 0 | Status: SHIPPED | OUTPATIENT
Start: 2022-11-30 | End: 2023-02-24

## 2022-12-05 ENCOUNTER — OFFICE VISIT (OUTPATIENT)
Dept: FAMILY MEDICINE CLINIC | Facility: CLINIC | Age: 62
End: 2022-12-05

## 2022-12-05 VITALS
HEART RATE: 67 BPM | HEIGHT: 70 IN | SYSTOLIC BLOOD PRESSURE: 120 MMHG | WEIGHT: 184 LBS | DIASTOLIC BLOOD PRESSURE: 70 MMHG | OXYGEN SATURATION: 98 % | BODY MASS INDEX: 26.34 KG/M2 | RESPIRATION RATE: 18 BRPM

## 2022-12-05 DIAGNOSIS — M99.01 CERVICAL (NECK) REGION SOMATIC DYSFUNCTION: Primary | ICD-10-CM

## 2022-12-05 PROCEDURE — 90750 HZV VACC RECOMBINANT IM: CPT | Performed by: FAMILY MEDICINE

## 2022-12-05 PROCEDURE — 90471 IMMUNIZATION ADMIN: CPT | Performed by: FAMILY MEDICINE

## 2022-12-05 PROCEDURE — 99212 OFFICE O/P EST SF 10 MIN: CPT | Performed by: FAMILY MEDICINE

## 2022-12-05 NOTE — PROGRESS NOTES
Assessment and Plan     Problem List Items Addressed This Visit    None  Visit Diagnoses     Popping sound of neck    -  Primary    Reassurance          Patient was given instructions and counseling regarding his condition or for health maintenance advice. Please see specific information pulled into the AVS if appropriate.        Henrry is a 62 y.o. being seen today for  Pain (Neck with popping when turning worse on right side.  Started several months ago )   Subjective   History of the Present Illness   His neck is popping -- he can hear it popping although individuals around him cannot.  He completely denies any pain with the popping but just was concerned about hearing the noise.  Social History  He  reports that he quit smoking about 41 years ago. His smoking use included cigarettes. He has a 2.50 pack-year smoking history. He has never used smokeless tobacco. He reports that he does not drink alcohol and does not use drugs.  Objective   Vital Signs        BP Readings from Last 1 Encounters:   12/05/22 120/70     Wt Readings from Last 3 Encounters:   12/05/22 83.5 kg (184 lb)   10/27/22 81.6 kg (180 lb)   10/26/22 81.6 kg (180 lb)   Body mass index is 26.4 kg/m².     Physical Exam  Vitals reviewed.   Constitutional:       Appearance: Normal appearance. He is well-developed.   Neurological:      Mental Status: He is alert.   Psychiatric:         Behavior: Behavior normal.         Thought Content: Thought content normal.         Judgment: Judgment normal.

## 2022-12-16 ENCOUNTER — TELEPHONE (OUTPATIENT)
Dept: FAMILY MEDICINE CLINIC | Facility: CLINIC | Age: 62
End: 2022-12-16

## 2022-12-16 NOTE — TELEPHONE ENCOUNTER
Caller: Henrry Glover    Relationship: Self    Best call back number: [phone]    What was the call regarding: PATIENT STATED THAT HE IS HACKING UP YELLOW PHLEGM, AND AT TIMES IS OVERALL NOT FEELING GOOD. PATIENT STATED THAT HE IS STILL FEELING WEAK. PATIENT WOULD LIKE TO KNOW WHAT HE CAN TAKE FOR THIS. PLEASE ADVISE.     Do you require a callback: YES

## 2022-12-19 NOTE — TELEPHONE ENCOUNTER
Called patient and he states he is feeling a little better, will continue to take mucinex and monitor symptoms. States that if he does not keep getting better he will call us to schedule OV.

## 2022-12-22 ENCOUNTER — HOSPITAL ENCOUNTER (OUTPATIENT)
Dept: CARDIOLOGY | Facility: HOSPITAL | Age: 62
Discharge: HOME | End: 2022-12-22

## 2022-12-22 ENCOUNTER — HOSPITAL ENCOUNTER (OUTPATIENT)
Dept: CARDIOLOGY | Facility: HOSPITAL | Age: 62
Discharge: HOME | End: 2022-12-22
Admitting: INTERNAL MEDICINE

## 2022-12-22 VITALS
OXYGEN SATURATION: 99 % | DIASTOLIC BLOOD PRESSURE: 71 MMHG | RESPIRATION RATE: 20 BRPM | SYSTOLIC BLOOD PRESSURE: 131 MMHG

## 2022-12-22 DIAGNOSIS — R07.2 PRECORDIAL PAIN: ICD-10-CM

## 2022-12-22 DIAGNOSIS — M25.512 ACUTE PAIN OF BOTH SHOULDERS: ICD-10-CM

## 2022-12-22 DIAGNOSIS — R06.02 SHORTNESS OF BREATH: ICD-10-CM

## 2022-12-22 DIAGNOSIS — M25.511 ACUTE PAIN OF BOTH SHOULDERS: ICD-10-CM

## 2022-12-22 LAB
ALBUMIN SERPL-MCNC: 4.5 G/DL (ref 3.5–5.2)
ALBUMIN/GLOB SERPL: 1.3 G/DL
ALP SERPL-CCNC: 94 U/L (ref 39–117)
ALT SERPL W P-5'-P-CCNC: 26 U/L (ref 1–41)
ANION GAP SERPL CALCULATED.3IONS-SCNC: 10.9 MMOL/L (ref 5–15)
AORTIC ARCH: 2.7 CM
ASCENDING AORTA: 4.3 CM
AST SERPL-CCNC: 41 U/L (ref 1–40)
BASOPHILS # BLD AUTO: 0.07 10*3/MM3 (ref 0–0.2)
BASOPHILS NFR BLD AUTO: 0.6 % (ref 0–1.5)
BH CV ECHO LEFT ATRIAL STRAIN: 44.1 %
BH CV ECHO MEAS - ACS: 2.11 CM
BH CV ECHO MEAS - AO MAX PG: 12.2 MMHG
BH CV ECHO MEAS - AO MEAN PG: 5.2 MMHG
BH CV ECHO MEAS - AO ROOT DIAM: 3.4 CM
BH CV ECHO MEAS - AO V2 MAX: 174.9 CM/SEC
BH CV ECHO MEAS - AO V2 VTI: 33.4 CM
BH CV ECHO MEAS - AVA(I,D): 3.4 CM2
BH CV ECHO MEAS - EDV(CUBED): 150.2 ML
BH CV ECHO MEAS - EDV(MOD-SP2): 158 ML
BH CV ECHO MEAS - EDV(MOD-SP4): 137 ML
BH CV ECHO MEAS - EF(MOD-BP): 67.3 %
BH CV ECHO MEAS - EF(MOD-SP2): 65.2 %
BH CV ECHO MEAS - EF(MOD-SP4): 66.4 %
BH CV ECHO MEAS - ESV(CUBED): 27.6 ML
BH CV ECHO MEAS - ESV(MOD-SP2): 55 ML
BH CV ECHO MEAS - ESV(MOD-SP4): 46 ML
BH CV ECHO MEAS - FS: 43.1 %
BH CV ECHO MEAS - IVS/LVPW: 1.13 CM
BH CV ECHO MEAS - IVSD: 1.04 CM
BH CV ECHO MEAS - LAT PEAK E' VEL: 12.5 CM/SEC
BH CV ECHO MEAS - LV DIASTOLIC VOL/BSA (35-75): 69.4 CM2
BH CV ECHO MEAS - LV MASS(C)D: 196.1 GRAMS
BH CV ECHO MEAS - LV MAX PG: 8.4 MMHG
BH CV ECHO MEAS - LV MEAN PG: 4.5 MMHG
BH CV ECHO MEAS - LV SYSTOLIC VOL/BSA (12-30): 23.3 CM2
BH CV ECHO MEAS - LV V1 MAX: 144.8 CM/SEC
BH CV ECHO MEAS - LV V1 VTI: 30.1 CM
BH CV ECHO MEAS - LVIDD: 5.3 CM
BH CV ECHO MEAS - LVIDS: 3 CM
BH CV ECHO MEAS - LVOT AREA: 3.7 CM2
BH CV ECHO MEAS - LVOT DIAM: 2.18 CM
BH CV ECHO MEAS - LVPWD: 0.92 CM
BH CV ECHO MEAS - MED PEAK E' VEL: 11.7 CM/SEC
BH CV ECHO MEAS - MR MAX PG: 161 MMHG
BH CV ECHO MEAS - MR MAX VEL: 634.4 CM/SEC
BH CV ECHO MEAS - MV A DUR: 0.12 SEC
BH CV ECHO MEAS - MV A MAX VEL: 91.7 CM/SEC
BH CV ECHO MEAS - MV DEC SLOPE: 615.6 CM/SEC2
BH CV ECHO MEAS - MV DEC TIME: 0.2 MSEC
BH CV ECHO MEAS - MV E MAX VEL: 127 CM/SEC
BH CV ECHO MEAS - MV E/A: 1.39
BH CV ECHO MEAS - MV MAX PG: 7.7 MMHG
BH CV ECHO MEAS - MV MEAN PG: 3.2 MMHG
BH CV ECHO MEAS - MV P1/2T: 62.5 MSEC
BH CV ECHO MEAS - MV V2 VTI: 44.4 CM
BH CV ECHO MEAS - MVA(P1/2T): 3.5 CM2
BH CV ECHO MEAS - MVA(VTI): 2.5 CM2
BH CV ECHO MEAS - PA ACC TIME: 0.15 SEC
BH CV ECHO MEAS - PA PR(ACCEL): 10.1 MMHG
BH CV ECHO MEAS - PA V2 MAX: 135.1 CM/SEC
BH CV ECHO MEAS - PULM A REVS DUR: 0.09 SEC
BH CV ECHO MEAS - PULM A REVS VEL: 31.3 CM/SEC
BH CV ECHO MEAS - PULM DIAS VEL: 40.3 CM/SEC
BH CV ECHO MEAS - PULM S/D: 1.2
BH CV ECHO MEAS - PULM SYS VEL: 48.4 CM/SEC
BH CV ECHO MEAS - QP/QS: 0.86
BH CV ECHO MEAS - RAP SYSTOLE: 3 MMHG
BH CV ECHO MEAS - RV MAX PG: 5.4 MMHG
BH CV ECHO MEAS - RV V1 MAX: 116.5 CM/SEC
BH CV ECHO MEAS - RV V1 VTI: 27.7 CM
BH CV ECHO MEAS - RVOT DIAM: 2.12 CM
BH CV ECHO MEAS - RVSP: 42 MMHG
BH CV ECHO MEAS - SI(MOD-SP2): 52.1 ML/M2
BH CV ECHO MEAS - SI(MOD-SP4): 46.1 ML/M2
BH CV ECHO MEAS - SUP REN AO DIAM: 2.1 CM
BH CV ECHO MEAS - SV(LVOT): 112.8 ML
BH CV ECHO MEAS - SV(MOD-SP2): 103 ML
BH CV ECHO MEAS - SV(MOD-SP4): 91 ML
BH CV ECHO MEAS - SV(RVOT): 97.5 ML
BH CV ECHO MEAS - TR MAX PG: 39 MMHG
BH CV ECHO MEAS - TR MAX VEL: 312.2 CM/SEC
BH CV ECHO MEAS RV FREE WALL STRAIN: -32.8 %
BH CV ECHO MEASUREMENTS AVERAGE E/E' RATIO: 10.5
BH CV STRESS BP STAGE 1: NORMAL
BH CV STRESS BP STAGE 2: NORMAL
BH CV STRESS BP STAGE 3: NORMAL
BH CV STRESS DURATION MIN STAGE 1: 3
BH CV STRESS DURATION MIN STAGE 2: 3
BH CV STRESS DURATION MIN STAGE 3: 2
BH CV STRESS DURATION SEC STAGE 1: 0
BH CV STRESS DURATION SEC STAGE 2: 0
BH CV STRESS DURATION SEC STAGE 3: 30
BH CV STRESS GRADE STAGE 1: 10
BH CV STRESS GRADE STAGE 2: 12
BH CV STRESS GRADE STAGE 3: 14
BH CV STRESS HR STAGE 1: 124
BH CV STRESS HR STAGE 2: 154
BH CV STRESS HR STAGE 3: 174
BH CV STRESS METS STAGE 1: 5
BH CV STRESS METS STAGE 2: 7.5
BH CV STRESS METS STAGE 3: 9
BH CV STRESS PROTOCOL 1: NORMAL
BH CV STRESS RECOVERY BP: NORMAL MMHG
BH CV STRESS RECOVERY HR: 102 BPM
BH CV STRESS SPEED STAGE 1: 1.7
BH CV STRESS SPEED STAGE 2: 2.5
BH CV STRESS SPEED STAGE 3: 3.4
BH CV STRESS STAGE 1: 1
BH CV STRESS STAGE 2: 2
BH CV STRESS STAGE 3: 3
BH CV XLRA - RV BASE: 3.6 CM
BH CV XLRA - RV LENGTH: 8.2 CM
BH CV XLRA - RV MID: 3.1 CM
BH CV XLRA - TDI S': 20.5 CM/SEC
BILIRUB SERPL-MCNC: 1.1 MG/DL (ref 0–1.2)
BUN SERPL-MCNC: 10 MG/DL (ref 8–23)
BUN/CREAT SERPL: 12.3 (ref 7–25)
CALCIUM SPEC-SCNC: 9.8 MG/DL (ref 8.6–10.5)
CHLORIDE SERPL-SCNC: 110 MMOL/L (ref 98–107)
CO2 SERPL-SCNC: 22.1 MMOL/L (ref 22–29)
CREAT SERPL-MCNC: 0.81 MG/DL (ref 0.76–1.27)
D DIMER PPP FEU-MCNC: 1.07 MCGFEU/ML (ref 0–0.64)
DEPRECATED RDW RBC AUTO: 48.5 FL (ref 37–54)
EGFRCR SERPLBLD CKD-EPI 2021: 98.5 ML/MIN/1.73
EOSINOPHIL # BLD AUTO: 0.06 10*3/MM3 (ref 0–0.4)
EOSINOPHIL NFR BLD AUTO: 0.5 % (ref 0.3–6.2)
ERYTHROCYTE [DISTWIDTH] IN BLOOD BY AUTOMATED COUNT: 14.6 % (ref 12.3–15.4)
GLOBULIN UR ELPH-MCNC: 3.4 GM/DL
GLUCOSE SERPL-MCNC: 94 MG/DL (ref 65–99)
HCT VFR BLD AUTO: 32.1 % (ref 37.5–51)
HGB BLD-MCNC: 10.6 G/DL (ref 13–17.7)
IMM GRANULOCYTES # BLD AUTO: 0.06 10*3/MM3 (ref 0–0.05)
IMM GRANULOCYTES NFR BLD AUTO: 0.5 % (ref 0–0.5)
LEFT ATRIUM VOLUME INDEX: 40.2 ML/M2
LYMPHOCYTES # BLD AUTO: 2.29 10*3/MM3 (ref 0.7–3.1)
LYMPHOCYTES NFR BLD AUTO: 19.6 % (ref 19.6–45.3)
MAXIMAL PREDICTED HEART RATE: 156 BPM
MAXIMAL PREDICTED HEART RATE: 156 BPM
MCH RBC QN AUTO: 30.1 PG (ref 26.6–33)
MCHC RBC AUTO-ENTMCNC: 33 G/DL (ref 31.5–35.7)
MCV RBC AUTO: 91.2 FL (ref 79–97)
MONOCYTES # BLD AUTO: 0.93 10*3/MM3 (ref 0.1–0.9)
MONOCYTES NFR BLD AUTO: 7.9 % (ref 5–12)
NEUTROPHILS NFR BLD AUTO: 70.9 % (ref 42.7–76)
NEUTROPHILS NFR BLD AUTO: 8.29 10*3/MM3 (ref 1.7–7)
NRBC BLD AUTO-RTO: 1.3 /100 WBC (ref 0–0.2)
NT-PROBNP SERPL-MCNC: 71.2 PG/ML (ref 0–900)
PERCENT MAX PREDICTED HR: 111.54 %
PLATELET # BLD AUTO: 308 10*3/MM3 (ref 140–450)
PMV BLD AUTO: 11.7 FL (ref 6–12)
POTASSIUM SERPL-SCNC: 4 MMOL/L (ref 3.5–5.2)
PROT SERPL-MCNC: 7.9 G/DL (ref 6–8.5)
RBC # BLD AUTO: 3.52 10*6/MM3 (ref 4.14–5.8)
SINUS: 3.4 CM
SODIUM SERPL-SCNC: 143 MMOL/L (ref 136–145)
STJ: 3.4 CM
STRESS BASELINE BP: NORMAL MMHG
STRESS BASELINE HR: 75 BPM
STRESS PERCENT HR: 131 %
STRESS POST ESTIMATED WORKLOAD: 9 METS
STRESS POST EXERCISE DUR MIN: 8 MIN
STRESS POST EXERCISE DUR SEC: 30 SEC
STRESS POST PEAK BP: NORMAL MMHG
STRESS POST PEAK HR: 174 BPM
STRESS TARGET HR: 133 BPM
STRESS TARGET HR: 133 BPM
TROPONIN T SERPL-MCNC: <0.01 NG/ML (ref 0–0.03)
WBC NRBC COR # BLD: 11.7 10*3/MM3 (ref 3.4–10.8)

## 2022-12-22 PROCEDURE — 85379 FIBRIN DEGRADATION QUANT: CPT | Performed by: INTERNAL MEDICINE

## 2022-12-22 PROCEDURE — 93017 CV STRESS TEST TRACING ONLY: CPT

## 2022-12-22 PROCEDURE — 94760 N-INVAS EAR/PLS OXIMETRY 1: CPT

## 2022-12-22 PROCEDURE — 85025 COMPLETE CBC W/AUTO DIFF WBC: CPT

## 2022-12-22 PROCEDURE — 80053 COMPREHEN METABOLIC PANEL: CPT

## 2022-12-22 PROCEDURE — 99204 OFFICE O/P NEW MOD 45 MIN: CPT | Performed by: INTERNAL MEDICINE

## 2022-12-22 PROCEDURE — 93306 TTE W/DOPPLER COMPLETE: CPT | Performed by: INTERNAL MEDICINE

## 2022-12-22 PROCEDURE — 84484 ASSAY OF TROPONIN QUANT: CPT | Performed by: INTERNAL MEDICINE

## 2022-12-22 PROCEDURE — 93016 CV STRESS TEST SUPVJ ONLY: CPT | Performed by: INTERNAL MEDICINE

## 2022-12-22 PROCEDURE — 93306 TTE W/DOPPLER COMPLETE: CPT

## 2022-12-22 PROCEDURE — 93018 CV STRESS TEST I&R ONLY: CPT | Performed by: INTERNAL MEDICINE

## 2022-12-22 PROCEDURE — 83880 ASSAY OF NATRIURETIC PEPTIDE: CPT | Performed by: INTERNAL MEDICINE

## 2022-12-22 PROCEDURE — 36415 COLL VENOUS BLD VENIPUNCTURE: CPT

## 2022-12-22 RX ORDER — NITROGLYCERIN 0.4 MG/1
0.4 TABLET SUBLINGUAL
Status: DISCONTINUED | OUTPATIENT
Start: 2022-12-22 | End: 2023-01-30

## 2022-12-22 RX ORDER — MELOXICAM 15 MG/1
15 TABLET ORAL DAILY
Qty: 15 TABLET | Refills: 0 | Status: SHIPPED | OUTPATIENT
Start: 2022-12-22 | End: 2023-01-03

## 2022-12-22 RX ORDER — SODIUM CHLORIDE 0.9 % (FLUSH) 0.9 %
10 SYRINGE (ML) INJECTION AS NEEDED
Status: DISCONTINUED | OUTPATIENT
Start: 2022-12-22 | End: 2023-01-30

## 2022-12-22 NOTE — ADDENDUM NOTE
Encounter addended by: Marla López RN on: 12/22/2022 2:00 PM   Actions taken: Order list changed, Diagnosis association updated

## 2022-12-22 NOTE — PROGRESS NOTES
Date of Office Visit: 2022  Encounter Provider: Nalini Camara MD  Place of Service: [facility] CARDIOLOGY  Patient Name: Henrry Glover  :1960      Patient ID:  Henrry Glover is a 62 y.o. male is here for chest pain and dyspnea.          History of Present Illness    He has a history of hypertension, sickle cell anemia and a heart murmur.  He had a pancreatic mass which was surgically removed with a Whipple procedure.  He has remote history of cholecystectomy.      He noticed yesterday he began having some chest pressure in the center part of his chest rating to his left shoulder.  He had been at [employer] and felt like maybe he just strained something.  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  He works 25 to 30 hours.  Yesterday he was working and he said he walked morning went up some steps to see if it would make the pain worse and it did not.  The pain was described as a pressure and really worse yesterday, improved today.  He is not short winded and does feel his heart racing or skipping.  He has no history of tobacco use and uses no alcohol.    EKG done this morning shows sinus rhythm with left anterior fascicular block.  Labs today show D-dimer elevated 1.07, hemoglobin 10.6, white count 11.7, otherwise normal CBC, normal troponin, CMP and proBNP.    Past Medical History:   Diagnosis Date   • Acid reflux    • Allergic    • Anxiety    • Arthritis    • Benign essential HTN 2017   • BPH (benign prostatic hyperplasia)    • DVT (deep venous thrombosis) (HCC)    • Eczema    • Heart murmur    • Hemorrhoid    • Hepatitis C     history cleared   • History of pneumonia    • History of transfusion    • Sickle cell anemia (HCC)    • Sinus problem    • Sleep apnea     cpap   • Tinnitus          Past Surgical History:   Procedure Laterality Date   • CHOLECYSTECTOMY     • COLONOSCOPY     •  ENDOSCOPY  02/22/2005   • HEMORRHOIDECTOMY N/A 6/23/2016    Procedure: HEMORRHOIDECTOMY;  Surgeon: Ct Recio MD;  Location: [facility];  Service:    • LIPOMA EXCISION     • LYMPHADENECTOMY     • WHIPPLE PROCEDURE  07/26/2019    UL   • WRIST SURGERY Right     fractured and had plate put in       Current Outpatient Medications on File Prior to Encounter   Medication Sig Dispense Refill   • amLODIPine (NORVASC) 5 MG tablet Take 1 tablet by mouth Daily. 90 tablet 1   • Ascorbic Acid (VITAMIN C PO)      • cetirizine (zyrTEC) 10 MG tablet Take 10 mg by mouth Daily.     • famciclovir (FAMVIR) 500 MG tablet TAKE 2 TABLETS BY MOUTH TWICE DAILY AS NEEDED FOR ONSET OF SYMPTOMS 24 tablet 0   • fluticasone (FLONASE) 50 MCG/ACT nasal spray 1 spray into each nostril daily. INT 1 SPRAY IN BOTH NOSTRILS ONCE A DAY  2   • LORazepam (ATIVAN) 0.5 MG tablet TAKE 1 TABLET BY MOUTH EVERY 8 HOURS AS NEEDED FOR ANXIETY 25 tablet 0   • montelukast (SINGULAIR) 10 MG tablet TAKE 1 TABLET BY MOUTH AT BEDTIME 90 tablet 2   • Multiple Vitamins-Minerals (MULTIVITAMIN PO)      • pantoprazole (PROTONIX) 40 MG EC tablet Take  by mouth.     • tamsulosin (FLOMAX) 0.4 MG capsule 24 hr capsule TK 1 C PO D  4   • azithromycin (ZITHROMAX) 250 MG tablet 2 tablets today, then 1 tablet daily 6 tablet 0   • benzonatate (TESSALON) 100 MG capsule Take 1 capsule by mouth 3 (Three) Times a Day As Needed for Cough. 30 capsule 0   • meloxicam (MOBIC) 15 MG tablet Take 1 tablet by mouth Daily. FINAL REFILL  MUST BE SEEN 15 tablet 0   • triamcinolone (KENALOG) 0.025 % cream APPLY EXTERNALLY TO THE AFFECTED AREA THREE TIMES DAILY 240 g 0   • [DISCONTINUED] hydrOXYzine (ATARAX) 25 MG tablet Take 1 tablet by mouth 3 (Three) Times a Day As Needed for Allergies or Anxiety. 30 tablet 0   • [DISCONTINUED] ipratropium (ATROVENT) 0.06 % nasal spray 2 sprays into the nostril(s) as directed by provider 4 (Four) Times a Day. 1 each 0   • [DISCONTINUED] meloxicam (MOBIC)  15 MG tablet TAKE 1 TABLET BY MOUTH DAILY 30 tablet 2     No current facility-administered medications on file prior to encounter.       Social History     Socioeconomic History   • Marital status:    Tobacco Use   • Smoking status: Former     Packs/day: 0.25     Years: 10.00     Pack years: 2.50     Types: Cigarettes     Quit date: 1981     Years since quittin.5   • Smokeless tobacco: Never   Vaping Use   • Vaping Use: Never used   Substance and Sexual Activity   • Alcohol use: No   • Drug use: No   • Sexual activity: Defer           ROS    Procedures  Procedures        Objective:      Vitals:    22 1159   BP: 131/71   BP Location: Right arm   Patient Position: Sitting   Resp: 20   SpO2: 99%     There is no height or weight on file to calculate BMI.    Vitals reviewed.   Constitutional:       General: Not in acute distress.     Appearance: Well-developed. Not diaphoretic.   Eyes:      General: No scleral icterus.     Conjunctiva/sclera: Conjunctivae normal.   HENT:      Head: Normocephalic and atraumatic.   Neck:      Thyroid: No thyromegaly.      Vascular: No carotid bruit or JVD.      Lymphadenopathy: No cervical adenopathy.   Pulmonary:      Effort: Pulmonary effort is normal. No respiratory distress.      Breath sounds: Normal breath sounds. No wheezing. No rhonchi. No rales.   Chest:      Chest wall: Not tender to palpatation.   Cardiovascular:      Normal rate. Regular rhythm.      Murmurs: There is a grade 3/6 midsystolic murmur at the URSB, LLSB and ULSB.      No gallop.   Pulses:     Intact distal pulses.   Edema:     Peripheral edema absent.   Abdominal:      General: Bowel sounds are normal. There is no distension or abdominal bruit.      Palpations: Abdomen is soft. There is no abdominal mass.      Tenderness: There is no abdominal tenderness.   Musculoskeletal:         General: No deformity.      Extremities: No clubbing present.     Cervical back: Neck supple. Skin:     General:  Skin is warm and dry. There is no cyanosis.      Coloration: Skin is not pale.      Findings: No rash.   Neurological:      Mental Status: Alert and oriented to person, place, and time.      Cranial Nerves: No cranial nerve deficit.   Psychiatric:         Judgment: Judgment normal.         Lab Review:       Assessment:      Diagnosis Plan   1. Precordial pain  Cardiac Monitoring    Vital Signs - Once    Vital Signs - As Needed    Pulse Oximetry    Oxygen Therapy- Nasal Cannula; Titrate for SPO2: 92%, equal to or greater than    Insert Peripheral IV    sodium chloride 0.9 % flush 10 mL    nitroglycerin (NITROSTAT) SL tablet 0.4 mg    NPO Diet NPO Type: Strict NPO    Bathroom Privileges With Assistance    CBC & Differential    Comprehensive Metabolic Panel    Troponin T    D-Dimer    proBNP    Cardiac Monitoring    Vital Signs - Once    Insert Peripheral IV    NPO Diet NPO Type: Strict NPO    Bathroom Privileges With Assistance    Troponin T    Troponin T    D-Dimer    D-Dimer    proBNP    proBNP    Adult Transthoracic Echo Complete W/ Cont if Necessary Per Protocol    Treadmill Stress Test      2. Shortness of breath  Cardiac Monitoring    Vital Signs - Once    Vital Signs - As Needed    Pulse Oximetry    Oxygen Therapy- Nasal Cannula; Titrate for SPO2: 92%, equal to or greater than    Insert Peripheral IV    sodium chloride 0.9 % flush 10 mL    nitroglycerin (NITROSTAT) SL tablet 0.4 mg    NPO Diet NPO Type: Strict NPO    Bathroom Privileges With Assistance    CBC & Differential    Comprehensive Metabolic Panel    Troponin T    D-Dimer    proBNP    Cardiac Monitoring    Vital Signs - Once    Insert Peripheral IV    NPO Diet NPO Type: Strict NPO    Bathroom Privileges With Assistance    Troponin T    Troponin T    D-Dimer    D-Dimer    proBNP    proBNP        1. Chest pressure, no real dyspnea, somewhat atypical as not worse with activity.  proBNP is normal, ECG shows no ACS.  Set treadmill stress study to be done  today.  2. Murmur on examination, set up echocardiogram  3. Hypertension  4. Sickle cell anemia with mild anemia currently  5. LAFB on ECG     Plan:       Plan for treadmill stress test and echocardiogram today.  If his right ventricle looks okay, I am not worried about large PE burden but he may need a CTA chest.

## 2022-12-29 ENCOUNTER — TELEPHONE (OUTPATIENT)
Dept: CARDIOLOGY | Facility: CLINIC | Age: 62
End: 2022-12-29

## 2022-12-29 LAB
MAXIMAL PREDICTED HEART RATE: 156 BPM
STRESS TARGET HR: 133 BPM

## 2022-12-29 NOTE — TELEPHONE ENCOUNTER
I spoke with Henrry Glover and updated pt on results from provider.  Pt verbalized understanding and has no further questions at this time.    Thank you,    Millicent Abarca, RN  Triage [facility] [place]  12/29/22 11:34 EST

## 2022-12-29 NOTE — TELEPHONE ENCOUNTER
I tried to call Henrry Glover but there was no answer.  Left a voicemail asking patient to call back.  Will continue to try to reach pt.    Thank you,    Millicent Abarca RN  [place] [facility] [place]  12/29/22 11:21 EST

## 2023-01-03 DIAGNOSIS — M25.511 ACUTE PAIN OF BOTH SHOULDERS: ICD-10-CM

## 2023-01-03 DIAGNOSIS — M25.512 ACUTE PAIN OF BOTH SHOULDERS: ICD-10-CM

## 2023-01-03 RX ORDER — MELOXICAM 15 MG/1
TABLET ORAL
Qty: 90 TABLET | Refills: 0 | Status: SHIPPED | OUTPATIENT
Start: 2023-01-03

## 2023-01-30 ENCOUNTER — OFFICE VISIT (OUTPATIENT)
Dept: FAMILY MEDICINE CLINIC | Facility: CLINIC | Age: 63
End: 2023-01-30
Payer: COMMERCIAL

## 2023-01-30 VITALS
HEIGHT: 69 IN | DIASTOLIC BLOOD PRESSURE: 80 MMHG | OXYGEN SATURATION: 97 % | HEART RATE: 56 BPM | WEIGHT: 185 LBS | SYSTOLIC BLOOD PRESSURE: 138 MMHG | BODY MASS INDEX: 27.4 KG/M2 | RESPIRATION RATE: 18 BRPM

## 2023-01-30 DIAGNOSIS — M54.2 NECK PAIN: Primary | ICD-10-CM

## 2023-01-30 PROCEDURE — 99213 OFFICE O/P EST LOW 20 MIN: CPT | Performed by: FAMILY MEDICINE

## 2023-01-30 RX ORDER — TIZANIDINE HYDROCHLORIDE 4 MG/1
4 CAPSULE, GELATIN COATED ORAL NIGHTLY PRN
Qty: 25 CAPSULE | Refills: 0 | Status: SHIPPED | OUTPATIENT
Start: 2023-01-30

## 2023-01-30 NOTE — PROGRESS NOTES
Assessment and Plan     Problem List Items Addressed This Visit    None  Visit Diagnoses     Neck pain    -  Primary    Relevant Medications    TiZANidine (Zanaflex) 4 MG capsule    Other Relevant Orders    Ambulatory Referral to Physical Therapy Evaluate and treat (Completed)        Patient was given instructions and counseling regarding his condition or for health maintenance advice. Please see specific information pulled into the AVS if appropriate.        Henrry is a 62 y.o. being seen today for  Neck Pain (Right side of neck for a couple of weeks.)   Subjective   History of the Present Illness   Having pain with turning his head for the last couple of weeks. Using fewer pillows to sleep seems to help him. Using Advil. That helps. No radiation. Shoulders hurt.   Social History  He  reports that he quit smoking about 41 years ago. His smoking use included cigarettes. He has a 2.50 pack-year smoking history. He has never used smokeless tobacco. He reports that he does not drink alcohol and does not use drugs.  Objective   Vital Signs        BP Readings from Last 1 Encounters:   01/30/23 138/80     Wt Readings from Last 3 Encounters:   01/30/23 83.9 kg (185 lb)   12/26/22 81.6 kg (180 lb)   12/22/22 81.6 kg (180 lb)   Body mass index is 27.32 kg/m².     Physical Exam  Vitals reviewed.   Constitutional:       Appearance: Normal appearance.   HENT:      Head:      Comments: Mask in place.  Cardiovascular:      Rate and Rhythm: Normal rate and regular rhythm.      Heart sounds: No murmur heard.  Pulmonary:      Effort: Pulmonary effort is normal.      Breath sounds: Normal breath sounds. No wheezing.   Musculoskeletal:         General: No swelling or tenderness.      Comments: Some muscle spasm of the right trap and deeper muscle though no pain with palpation    Neurological:      Mental Status: He is alert and oriented to person, place, and time.   Psychiatric:         Mood and Affect: Mood normal.         Behavior:  Behavior normal.         Thought Content: Thought content normal.         Judgment: Judgment normal.                 Answers for HPI/ROS submitted by the patient on 1/30/2023  What is the primary reason for your visit?: Other  Please describe your symptoms.: Neck pain  Have you had these symptoms before?: No  How long have you been having these symptoms?: 1-2 weeks  Please list any medications you are currently taking for this condition.: Advil

## 2023-02-02 ENCOUNTER — OFFICE VISIT (OUTPATIENT)
Dept: CARDIOLOGY | Facility: CLINIC | Age: 63
End: 2023-02-02
Payer: COMMERCIAL

## 2023-02-02 VITALS
OXYGEN SATURATION: 96 % | BODY MASS INDEX: 27.99 KG/M2 | DIASTOLIC BLOOD PRESSURE: 70 MMHG | HEIGHT: 69 IN | HEART RATE: 63 BPM | SYSTOLIC BLOOD PRESSURE: 118 MMHG | WEIGHT: 189 LBS

## 2023-02-02 DIAGNOSIS — I10 BENIGN ESSENTIAL HTN: ICD-10-CM

## 2023-02-02 DIAGNOSIS — I77.810 ASCENDING AORTA DILATATION: ICD-10-CM

## 2023-02-02 DIAGNOSIS — R07.89 OTHER CHEST PAIN: Primary | ICD-10-CM

## 2023-02-02 DIAGNOSIS — Z86.711 HISTORY OF PULMONARY EMBOLISM: ICD-10-CM

## 2023-02-02 DIAGNOSIS — I44.4 LAFB (LEFT ANTERIOR FASCICULAR BLOCK): ICD-10-CM

## 2023-02-02 DIAGNOSIS — Z86.2 HISTORY OF SICKLE CELL ANEMIA: ICD-10-CM

## 2023-02-02 DIAGNOSIS — I49.3 PVC (PREMATURE VENTRICULAR CONTRACTION): ICD-10-CM

## 2023-02-02 DIAGNOSIS — I27.20 PULMONARY HYPERTENSION: ICD-10-CM

## 2023-02-02 PROCEDURE — 93000 ELECTROCARDIOGRAM COMPLETE: CPT | Performed by: NURSE PRACTITIONER

## 2023-02-02 PROCEDURE — 99214 OFFICE O/P EST MOD 30 MIN: CPT | Performed by: NURSE PRACTITIONER

## 2023-02-02 NOTE — PROGRESS NOTES
"      [facility] CARDIOLOGY  [address]  [facility] [address]  [facility] [address]  Phone: [phone]  Patient Name: Henrry Glover  :1960  Age: 62 y.o.  Primary Cardiologist: Nalini Camara MD  Encounter Provider:  JENIFER Keane    History of Present Illness     Henrry Glover is a 62 y.o. 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  He is followed in our office by Dr. Camara for heart murmur and chest pain evaluation. I have reviewed the past medical records in preparation of today's visit.     23 Follow-up:  He is here for 2-month follow-up and I am seeing him for the first time today.  He feels good and has had no further chest pain.  He walks frequently with his job and is doing so without issue.  He still has some dyspnea when going up steps but this quickly resolves.  He does not feel his heart racing or skipping beats, he has no palpitations, and occasionally has some leg swelling at the end of the day.  He has had no issues with sickle cell anemia.    Data Review     The following data was reviewed by JENIFER Keane on 23:    Vital Signs:   /70   Pulse 63   Ht 175.3 cm (69\")   Wt 85.7 kg (189 lb)   SpO2 96%   BMI 27.91 kg/m²       Weight:  Wt Readings from Last 3 Encounters:   23 85.7 kg (189 lb)   23 83.9 kg (185 lb)   22 81.6 kg (180 lb)     Body mass index is 27.91 kg/m².    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  • He has remote history of pulmonary embolus following cholecystectomy and pneumonia and about .  • 2021 CT chest with contrast showed stable dilation of the ascending aorta measuring 4.5 cm.  • 2022 he was seen in the [facility] for chest pressure that radiated " to his left shoulder.  He initially thought this was muscle strain after he had been at [employer].  He noticed a few days later that the chest pain got worse when he was walking up some steps.  His EKG showed left anterior fascicular bloc.  Labs were unremarkable except for an elevated troponin.  • December 2022 treadmill stress test showed no evidence of ischemia; occasional PACs and PVCs were noted during stress.  Echocardiogram showed EF 67%, normal LV diastolic function, mildly increased left atrial volume, mildly elevated RVSP at 42 mmHg, and mild dilation of the ascending aorta.  • December 2022 24-hour Holter monitor showed predominant rhythm to be normal sinus rhythm with average heart rate 80 bpm, rare PACs, rare PVCs, and no AV block.    Labs:  • 12/22/2022:  cr 0.8, K 4.3, AST 41, otherwise unremarkable CMP, proBNP 71, D-dimer 1.07, troponin <0.010, Hgb 10.6, Plt 308      ECG 12 Lead    Date/Time: 2/2/2023 9:44 AM  Performed by: Paty Hunt APRN  Authorized by: Paty Hunt APRN   Comparison: compared with previous ECG from 5/12/2017  Similar to previous ECG  Rhythm: sinus rhythm  Rate: normal  BPM: 63  Conduction: left anterior fascicular block  T flattening: V6    Clinical impression: abnormal EKG            Medications     Allergies as of 02/02/2023 - Reviewed 02/02/2023   Allergen Reaction Noted   • Levofloxacin Swelling 03/22/2016   • Penicillins Swelling 03/22/2016   • Sulfamethoxazole-trimethoprim Rash 08/09/2019         Current Outpatient Medications:   •  amLODIPine (NORVASC) 5 MG tablet, Take 1 tablet by mouth Daily., Disp: 90 tablet, Rfl: 1  •  Ascorbic Acid (VITAMIN C PO), , Disp: , Rfl:   •  benzonatate (TESSALON) 100 MG capsule, Take 1 capsule by mouth 3 (Three) Times a Day As Needed for Cough., Disp: 30 capsule, Rfl: 0  •  cetirizine (zyrTEC) 10 MG tablet, Take 10 mg by mouth Daily., Disp: , Rfl:   •  famciclovir (FAMVIR) 500 MG tablet, TAKE 2 TABLETS BY MOUTH  TWICE DAILY AS NEEDED FOR ONSET OF SYMPTOMS, Disp: 24 tablet, Rfl: 0  •  fluticasone (FLONASE) 50 MCG/ACT nasal spray, 1 spray into each nostril daily. INT 1 SPRAY IN BOTH NOSTRILS ONCE A DAY, Disp: , Rfl: 2  •  ipratropium (ATROVENT) 0.06 % nasal spray, INSTILL 2 SPRAYS IN EACH NOSTRIL FOUR TIMES DAILY, Disp: , Rfl:   •  loperamide (IMODIUM) 2 MG capsule, Take 1 capsule by mouth 4 (Four) Times a Day As Needed for Diarrhea., Disp: 6 capsule, Rfl: 1  •  LORazepam (ATIVAN) 0.5 MG tablet, TAKE 1 TABLET BY MOUTH EVERY 8 HOURS AS NEEDED FOR ANXIETY, Disp: 25 tablet, Rfl: 0  •  meloxicam (MOBIC) 15 MG tablet, TAKE 1 TABLET BY MOUTH DAILY, Disp: 90 tablet, Rfl: 0  •  montelukast (SINGULAIR) 10 MG tablet, TAKE 1 TABLET BY MOUTH AT BEDTIME, Disp: 90 tablet, Rfl: 2  •  Multiple Vitamins-Minerals (MULTIVITAMIN PO), , Disp: , Rfl:   •  ondansetron ODT (ZOFRAN-ODT) 4 MG disintegrating tablet, Place 1 tablet on the tongue Every 4 (Four) Hours., Disp: 12 tablet, Rfl: 0  •  pantoprazole (PROTONIX) 40 MG EC tablet, Take  by mouth., Disp: , Rfl:   •  predniSONE (DELTASONE) 50 MG tablet, Take 1 tablet by mouth Daily With Breakfast., Disp: 5 tablet, Rfl: 0  •  tamsulosin (FLOMAX) 0.4 MG capsule 24 hr capsule, TK 1 C PO D, Disp: , Rfl: 4  •  TiZANidine (Zanaflex) 4 MG capsule, Take 1 capsule by mouth At Night As Needed for Muscle Spasms., Disp: 25 capsule, Rfl: 0  •  triamcinolone (KENALOG) 0.025 % cream, APPLY EXTERNALLY TO THE AFFECTED AREA THREE TIMES DAILY, Disp: 240 g, Rfl: 0     Past History, Review of Systems, Exam     Past Medical History:   Diagnosis Date   • Acid reflux    • Allergic    • Anxiety    • Arthritis    • Benign essential HTN 4/21/2017   • BPH (benign prostatic hyperplasia)    • DVT (deep venous thrombosis) (HCC)    • Eczema    • Heart murmur    • Hemorrhoid    • Hepatitis C    • History of pneumonia    • History of transfusion    • Sickle cell anemia (HCC)    • Sinus problem    • Sleep apnea    • Tinnitus         Past Surgical History:   has a past surgical history that includes Cholecystectomy; Lymphadenectomy; Wrist surgery (Right); Colonoscopy (2012); Esophagogastroduodenoscopy (2005); Lipoma Excision; Hemorrhoid surgery (N/A, 2016); and Whipple Procedure (2019).     Social History     Socioeconomic History   • Marital status:    Tobacco Use   • Smoking status: Former     Packs/day: 0.25     Years: 10.00     Pack years: 2.50     Types: Cigarettes     Quit date: 1981     Years since quittin.6   • Smokeless tobacco: Never   Vaping Use   • Vaping Use: Never used   Substance and Sexual Activity   • Alcohol use: No   • Drug use: No   • Sexual activity: Defer       Review of Systems   Cardiovascular: Positive for dyspnea on exertion. Negative for chest pain, claudication, cyanosis, irregular heartbeat, leg swelling, near-syncope, orthopnea, palpitations, paroxysmal nocturnal dyspnea and syncope.       Vitals reviewed.   Constitutional:       Appearance: Not in distress.   Eyes:      Conjunctiva/sclera: Conjunctivae normal.      Pupils: Pupils are equal, round, and reactive to light.   HENT:      Head: Normocephalic.      Nose: Nose normal.    Mouth/Throat:      Pharynx: Oropharynx is clear.   Neck:      Vascular: JVD normal.   Pulmonary:      Effort: Pulmonary effort is normal.      Breath sounds: Normal breath sounds. No wheezing. No rhonchi. No rales.   Cardiovascular:      Normal rate. Regular rhythm. Normal S1. Normal S2.      Murmurs: There is no murmur.  3/6 Murmur at RSB > LSB   Pulses:     Intact distal pulses.   Edema:     Peripheral edema absent.   Abdominal:      General: Bowel sounds are normal. There is no distension.      Palpations: Abdomen is soft.      Tenderness: There is no abdominal tenderness.   Musculoskeletal: Normal range of motion.      Cervical back: Normal range of motion and neck supple. Skin:     General: Skin is warm and dry.   Neurological:      Mental  Status: Alert and oriented to person, place and time.   Psychiatric:         Attention and Perception: Attention normal.         Mood and Affect: Mood normal.         Speech: Speech normal.         Behavior: Behavior is cooperative.          Assessment and Plan     Assessment:  1. Other chest pain    2. LAFB (left anterior fascicular block)    3. History of pulmonary embolism    4. History of sickle cell anemia    5. PVC (premature ventricular contraction)    6. Ascending aorta dilatation (HCC)    7. Pulmonary hypertension (HCC)    8. Benign essential HTN         1. Chest pain: He presented to the [facility] in December 2022 with chest pain with exertion.  Left anterior fascicular block was noted on his EKG.  He had a normal treadmill stress test.  He has had no further episodes of chest pain.  2. Left anterior fascicular block: Nonischemic treadmill stress study in December 2022.  He is having no chest pain today.  3. History of PE: This occurred following cholecystectomy.  His D-dimer was positive in December 2022.  4. Sickle cell anemia: No recent issues.  His hemoglobin was stable when checked in December 2022.  5. Hypertension controlled on 5 mg amlodipine daily.  6. PACs/PVCs: These were noted during stress of treadmill stress test.  Only noted to occur rarely on December 2022 Holter monitor.  7. Ascending aorta: This was graded as mild on December 2022 echocardiogram.  CT chest with IV contrast in July 2021 showed the ascending aorta to be stable at 4.5 cm.  8. Pulmonary hypertension: RSVP was 42 mmHg on December 2022 echocardiogram.  He is compensated by exam today.    Mr. Glover is a patient evaluated by Dr. Camara in December 2022 for chest pain; treadmill stress test was normal and echocardiogram showed mild pulmonary hypertension.  His D-dimer was elevated but he has history of previous PE.  His chest pain has resolved and his breathing is comfortable.  He has some stable dyspnea with exertion but this may be  related to his sickle cell anemia.  Echocardiogram in December 2022 also showed mild dilation of the ascending aorta but this was noted to be stable on CT chest in July 2021.  I will have him follow-up with Dr. Camara in 1 year.    Return in about 1 year (around 2/2/2024) for Follow-up with Dr. Camara.  Orders Placed This Encounter   Procedures   • ECG 12 Lead      No orders of the defined types were placed in this encounter.        Thank you for the opportunity to participate in this patient's care.    JENIFER Bassett    This office note has been dictated.

## 2023-02-07 ENCOUNTER — CLINICAL SUPPORT (OUTPATIENT)
Dept: FAMILY MEDICINE CLINIC | Facility: CLINIC | Age: 63
End: 2023-02-07
Payer: COMMERCIAL

## 2023-02-07 DIAGNOSIS — Z23 NEED FOR VACCINATION: Primary | ICD-10-CM

## 2023-02-07 PROCEDURE — 90750 HZV VACC RECOMBINANT IM: CPT | Performed by: FAMILY MEDICINE

## 2023-02-23 DIAGNOSIS — L20.9 ATOPIC DERMATITIS, UNSPECIFIED TYPE: ICD-10-CM

## 2023-02-23 DIAGNOSIS — F41.9 ANXIETY: ICD-10-CM

## 2023-02-23 NOTE — TELEPHONE ENCOUNTER
Last office visit: 1/30/23    Next office visit: none scheduled    Last refill:   Lorazepam: 11/30/22  Triamcinolone: 10/28/21

## 2023-02-24 RX ORDER — LORAZEPAM 0.5 MG/1
TABLET ORAL
Qty: 25 TABLET | Refills: 0 | Status: SHIPPED | OUTPATIENT
Start: 2023-02-24

## 2023-02-24 RX ORDER — TRIAMCINOLONE ACETONIDE 0.25 MG/G
CREAM TOPICAL
Qty: 240 G | Refills: 0 | Status: SHIPPED | OUTPATIENT
Start: 2023-02-24

## 2023-03-24 ENCOUNTER — OFFICE VISIT (OUTPATIENT)
Dept: FAMILY MEDICINE CLINIC | Facility: CLINIC | Age: 63
End: 2023-03-24
Payer: MEDICARE

## 2023-03-24 VITALS
OXYGEN SATURATION: 97 % | DIASTOLIC BLOOD PRESSURE: 80 MMHG | WEIGHT: 179 LBS | RESPIRATION RATE: 18 BRPM | SYSTOLIC BLOOD PRESSURE: 130 MMHG | BODY MASS INDEX: 26.51 KG/M2 | HEIGHT: 69 IN | HEART RATE: 83 BPM

## 2023-03-24 DIAGNOSIS — R11.0 NAUSEA: ICD-10-CM

## 2023-03-24 DIAGNOSIS — K12.1 STOMATITIS: ICD-10-CM

## 2023-03-24 DIAGNOSIS — F41.9 ANXIETY: Primary | ICD-10-CM

## 2023-03-24 PROCEDURE — 99214 OFFICE O/P EST MOD 30 MIN: CPT | Performed by: FAMILY MEDICINE

## 2023-03-24 PROCEDURE — 3075F SYST BP GE 130 - 139MM HG: CPT | Performed by: FAMILY MEDICINE

## 2023-03-24 PROCEDURE — 3079F DIAST BP 80-89 MM HG: CPT | Performed by: FAMILY MEDICINE

## 2023-03-24 RX ORDER — ONDANSETRON 4 MG/1
4 TABLET, ORALLY DISINTEGRATING ORAL EVERY 4 HOURS
Qty: 12 TABLET | Refills: 0 | Status: SHIPPED | OUTPATIENT
Start: 2023-03-24

## 2023-03-24 RX ORDER — DESVENLAFAXINE SUCCINATE 50 MG/1
50 TABLET, EXTENDED RELEASE ORAL DAILY
Qty: 30 TABLET | Refills: 0 | Status: SHIPPED | OUTPATIENT
Start: 2023-03-24

## 2023-03-24 NOTE — PROGRESS NOTES
Assessment and Plan     Problem List Items Addressed This Visit        Mental Health    Anxiety - Primary    Overview     Fili 7/1/2022  This is better lately with only a couple of lorazepam's.  He did not do well on the Lexapro so will discontinue that.         Relevant Medications    desvenlafaxine (Pristiq) 50 MG 24 hr tablet    Other Relevant Orders    Ambulatory Referral to Behavioral Health   Other Visit Diagnoses     Nausea        Relevant Medications    ondansetron ODT (ZOFRAN-ODT) 4 MG disintegrating tablet          Patient was given instructions and counseling regarding his condition or for health maintenance advice. Please see specific information pulled into the AVS if appropriate.        Henrry is a 62 y.o. being seen today for  Anxiety, Stress, and trush   Subjective   History of the Present Illness   He is having tinnitis and that is making him more stressed. Is using white noise generator to help with that but its been worse lately. He is having ongoing tongue pain that he thinks is thrush related. Seeing an ENT on the 6th. The citalopram made him feel so bad that he needed a lorazepam for it. It made him feel more anxious. He's been having some nausea but unrelated to his meals. Eating seems to help. Answers for HPI/ROS submitted by the patient on 3/24/2023  What is the primary reason for your visit?: Other  Please describe your symptoms.: Anxiety and depression.  Have you had these symptoms before?: Yes  How long have you been having these symptoms?: 5-7 days  Please list any medications you are currently taking for this condition.: Lorazepam  Please describe any probable cause for these symptoms. : Tinnitus, stress.    Social History  He  reports that he quit smoking about 41 years ago. His smoking use included cigarettes. He has a 2.50 pack-year smoking history. He has never used smokeless tobacco. He reports that he does not drink alcohol and does not use drugs.  Objective   Vital Signs         BP Readings from Last 1 Encounters:   03/24/23 130/80     Wt Readings from Last 3 Encounters:   03/24/23 81.2 kg (179 lb)   02/02/23 85.7 kg (189 lb)   01/30/23 83.9 kg (185 lb)   Body mass index is 26.43 kg/m².     Physical Exam  Vitals reviewed.   Constitutional:       Appearance: Normal appearance. He is well-developed.   HENT:      Mouth/Throat:      Lips: Pink.      Mouth: Mucous membranes are dry.      Comments: Tongue is thickened with papillary prominence  Neurological:      Mental Status: He is alert.   Psychiatric:         Behavior: Behavior normal.         Thought Content: Thought content normal.         Judgment: Judgment normal.

## 2023-04-20 DIAGNOSIS — F41.9 ANXIETY: ICD-10-CM

## 2023-04-20 RX ORDER — DESVENLAFAXINE SUCCINATE 50 MG/1
50 TABLET, EXTENDED RELEASE ORAL DAILY
Qty: 30 TABLET | Refills: 2 | Status: SHIPPED | OUTPATIENT
Start: 2023-04-20

## 2023-04-20 RX ORDER — LORAZEPAM 0.5 MG/1
TABLET ORAL
Qty: 25 TABLET | Refills: 0 | Status: SHIPPED | OUTPATIENT
Start: 2023-04-20

## 2023-04-28 DIAGNOSIS — J45.909 ASTHMA, UNSPECIFIED ASTHMA SEVERITY, UNSPECIFIED WHETHER COMPLICATED, UNSPECIFIED WHETHER PERSISTENT: ICD-10-CM

## 2023-04-28 RX ORDER — MONTELUKAST SODIUM 10 MG/1
TABLET ORAL
Qty: 90 TABLET | Refills: 2 | Status: SHIPPED | OUTPATIENT
Start: 2023-04-28

## 2023-05-18 DIAGNOSIS — I10 BENIGN ESSENTIAL HTN: ICD-10-CM

## 2023-05-18 RX ORDER — AMLODIPINE BESYLATE 5 MG/1
5 TABLET ORAL DAILY
Qty: 90 TABLET | Refills: 1 | Status: SHIPPED | OUTPATIENT
Start: 2023-05-18

## 2023-07-13 PROBLEM — A08.11 NOROVIRUS: Status: ACTIVE | Noted: 2023-07-13

## 2023-07-18 ENCOUNTER — TELEPHONE (OUTPATIENT)
Dept: FAMILY MEDICINE CLINIC | Facility: CLINIC | Age: 63
End: 2023-07-18

## 2023-07-18 NOTE — TELEPHONE ENCOUNTER
Caller: Henrry Glover    Relationship: Self    Best call back number: [phone]  OK TO LEAVE MESSAGE    What medication are you requesting: NA    What are your current symptoms: THRUSH/TONGUE FEELS BETTER BUT HIS GUMS STILL HURT AND NOW LYMPH NODE IS PAINFUL AS WELL. OPENING HIS MOUTH IS SOMETIMES PAINFUL         Have you had these symptoms before:    [x] Yes  [] No    Have you been treated for these symptoms before:   [x] Yes  [] No    If a prescription is needed, what is your preferred pharmacy and phone number: [id number] [address] AT [facility] - [phone] [facility] [phone]      Additional notes:PATIENT WOULD LIKE TO KNOW WHAT TO DO NEXT. PLEASE CALL AND ADVISE.

## 2023-08-24 DIAGNOSIS — F41.9 ANXIETY: ICD-10-CM

## 2023-08-24 RX ORDER — LORAZEPAM 0.5 MG/1
TABLET ORAL
Qty: 25 TABLET | Refills: 0 | Status: SHIPPED | OUTPATIENT
Start: 2023-08-24

## 2023-09-13 ENCOUNTER — HOSPITAL ENCOUNTER (OUTPATIENT)
Facility: HOSPITAL | Age: 63
Discharge: HOME | End: 2023-09-13
Attending: STUDENT IN AN ORGANIZED HEALTH CARE EDUCATION/TRAINING PROGRAM | Admitting: STUDENT IN AN ORGANIZED HEALTH CARE EDUCATION/TRAINING PROGRAM
Payer: MEDICARE

## 2023-09-13 VITALS
HEIGHT: 70 IN | DIASTOLIC BLOOD PRESSURE: 77 MMHG | RESPIRATION RATE: 18 BRPM | SYSTOLIC BLOOD PRESSURE: 122 MMHG | WEIGHT: 165 LBS | TEMPERATURE: 98.3 F | HEART RATE: 56 BPM | OXYGEN SATURATION: 98 % | BODY MASS INDEX: 23.62 KG/M2

## 2023-09-13 DIAGNOSIS — R39.15 URINARY URGENCY: Primary | ICD-10-CM

## 2023-09-13 DIAGNOSIS — R11.0 NAUSEA: ICD-10-CM

## 2023-09-13 LAB
BILIRUB UR QL STRIP: NEGATIVE
CLARITY UR: CLEAR
COLOR UR: YELLOW
GLUCOSE BLDC GLUCOMTR-MCNC: 101 MG/DL (ref 70–130)
GLUCOSE UR STRIP-MCNC: NEGATIVE MG/DL
HGB UR QL STRIP.AUTO: NEGATIVE
KETONES UR QL STRIP: NEGATIVE
LEUKOCYTE ESTERASE UR QL STRIP.AUTO: NEGATIVE
NITRITE UR QL STRIP: NEGATIVE
PH UR STRIP.AUTO: 7 [PH] (ref 5–8)
PROT UR QL STRIP: NEGATIVE
SP GR UR STRIP: 1.01 (ref 1–1.03)
UROBILINOGEN UR QL STRIP: NORMAL

## 2023-09-13 PROCEDURE — 63710000001 ONDANSETRON ODT 4 MG TABLET DISPERSIBLE: Performed by: STUDENT IN AN ORGANIZED HEALTH CARE EDUCATION/TRAINING PROGRAM

## 2023-09-13 PROCEDURE — 81003 URINALYSIS AUTO W/O SCOPE: CPT | Performed by: STUDENT IN AN ORGANIZED HEALTH CARE EDUCATION/TRAINING PROGRAM

## 2023-09-13 PROCEDURE — 82948 REAGENT STRIP/BLOOD GLUCOSE: CPT

## 2023-09-13 PROCEDURE — G0463 HOSPITAL OUTPT CLINIC VISIT: HCPCS | Performed by: STUDENT IN AN ORGANIZED HEALTH CARE EDUCATION/TRAINING PROGRAM

## 2023-09-13 RX ORDER — ONDANSETRON 4 MG/1
4 TABLET, ORALLY DISINTEGRATING ORAL ONCE
Status: COMPLETED | OUTPATIENT
Start: 2023-09-13 | End: 2023-09-13

## 2023-09-13 RX ADMIN — ONDANSETRON 4 MG: 4 TABLET, ORALLY DISINTEGRATING ORAL at 20:07

## 2023-09-14 NOTE — FSED PROVIDER NOTE
Subjective   History of Present Illness  Pt is a 63 y.o. male with PMH as listed who presents for   Chief Complaint   Patient presents with    Urinary Urgency       Patient presents for urinary urgency and frequency.  He states that this evening he went to the bathroom and had normal urine output without hematuria, dysuria, no suprapubic pain.  20 minutes later he had sudden onset significant urinary urgency and felt he had to go to the bathroom again.  Still had no hematuria or dysuria, denies any suprapubic or testicular pain.  Has not had any recent fevers, no history of UTIs.  No other new complaints.    Review of Systems    Past Medical History:   Diagnosis Date    Acid reflux     Allergic     Anxiety     Arthritis     Benign essential HTN 2017    BPH (benign prostatic hyperplasia)     DVT (deep venous thrombosis)     Eczema     Heart murmur     Hemorrhoid     Hepatitis C     history cleared    History of pneumonia     History of transfusion     Sickle cell anemia     Sinus problem     Sleep apnea     cpap    Tinnitus        Allergies   Allergen Reactions    Levofloxacin Swelling    Penicillins Swelling    Sulfamethoxazole-Trimethoprim Rash       Past Surgical History:   Procedure Laterality Date    CHOLECYSTECTOMY      COLONOSCOPY      ENDOSCOPY  2005    HEMORRHOIDECTOMY N/A 2016    Procedure: HEMORRHOIDECTOMY;  Surgeon: Ct Recio MD;  Location: [facility];  Service:     LIPOMA EXCISION      LYMPHADENECTOMY      WHIPPLE PROCEDURE  2019    UL    WRIST SURGERY Right     fractured and had plate put in       Family History   Problem Relation Age of Onset    Heart attack Maternal Uncle        Social History     Socioeconomic History    Marital status:    Tobacco Use    Smoking status: Former     Packs/day: 0.25     Years: 10.00     Pack years: 2.50     Types: Cigarettes     Quit date: 1981     Years since quittin.2    Smokeless tobacco: Never   Vaping Use     Vaping Use: Never used   Substance and Sexual Activity    Alcohol use: No    Drug use: No    Sexual activity: Defer           Objective   Physical Exam  Constitutional:       Appearance: Normal appearance.   HENT:      Head: Normocephalic and atraumatic.      Mouth/Throat:      Mouth: Mucous membranes are moist.      Pharynx: Oropharynx is clear.   Eyes:      Conjunctiva/sclera: Conjunctivae normal.   Cardiovascular:      Rate and Rhythm: Normal rate and regular rhythm.   Pulmonary:      Effort: Pulmonary effort is normal.      Breath sounds: Normal breath sounds.   Abdominal:      General: Abdomen is flat.      Palpations: Abdomen is soft.      Tenderness: There is no abdominal tenderness.   Musculoskeletal:      Cervical back: Neck supple.   Skin:     General: Skin is warm and dry.   Neurological:      Mental Status: He is alert.   Psychiatric:         Mood and Affect: Mood normal.       Procedures           ED Course  ED Course as of 09/13/23 2034   Wed Sep 13, 2023   2000 Patient presents for urinary urgency this evening with associated nausea.  Exam is unremarkable.  Vital signs stable.  Will obtain UA and check POC glucose. [JF]   2033 UA negative, POC glucose normal.  Patient given Zofran for nausea, p.o. challenge successfully.  Patient without any symptoms at this time, vital signs stable.  Given no fever, abdominal pain, and normal UA and glucose patient appropriate for discharge.  Patient instructed to follow-up with PCP in the next few days for reevaluation and to return if he develops any chest pain, shortness of breath, abdominal pain, fever, or any other worrisome complaints.  Patient understands and agrees with plan of care. [JF]      ED Course User Index  [JF] Myron Szymanski MD                                           Medical Decision Making  My differential diagnosis for urinary frequency/urgency: UTI, diabetes, bladder spasm, detrusor hyperactivity        Problems Addressed:  Nausea:  complicated acute illness or injury  Urinary urgency: complicated acute illness or injury    Amount and/or Complexity of Data Reviewed  Labs: ordered.     Details: UA negative, POC glucose 101.    Risk  Prescription drug management.        Final diagnoses:   Urinary urgency   Nausea       ED Disposition  ED Disposition       ED Disposition   Discharge    Condition   Stable    Comment   --               Henny Patrick MD  [address] [id number] [phone]    In 3 days  For re-evaluation         Medication List      No changes were made to your prescriptions during this visit.

## 2023-10-04 DIAGNOSIS — F41.9 ANXIETY: ICD-10-CM

## 2023-10-04 RX ORDER — LORAZEPAM 0.5 MG/1
TABLET ORAL
Qty: 25 TABLET | Refills: 0 | Status: SHIPPED | OUTPATIENT
Start: 2023-10-04

## 2023-11-08 ENCOUNTER — APPOINTMENT (OUTPATIENT)
Dept: GENERAL RADIOLOGY | Facility: HOSPITAL | Age: 63
End: 2023-11-08
Payer: MEDICARE

## 2023-11-08 ENCOUNTER — HOSPITAL ENCOUNTER (EMERGENCY)
Facility: HOSPITAL | Age: 63
Discharge: LEFT BEFORE DISCHARGE | End: 2023-11-08
Attending: EMERGENCY MEDICINE
Payer: MEDICARE

## 2023-11-08 ENCOUNTER — APPOINTMENT (OUTPATIENT)
Dept: CT IMAGING | Facility: HOSPITAL | Age: 63
End: 2023-11-08
Payer: MEDICARE

## 2023-11-08 VITALS
HEART RATE: 69 BPM | SYSTOLIC BLOOD PRESSURE: 130 MMHG | TEMPERATURE: 98.1 F | RESPIRATION RATE: 16 BRPM | HEIGHT: 70 IN | OXYGEN SATURATION: 98 % | WEIGHT: 165 LBS | DIASTOLIC BLOOD PRESSURE: 78 MMHG | BODY MASS INDEX: 23.62 KG/M2

## 2023-11-08 DIAGNOSIS — R91.1 PULMONARY NODULE, RIGHT: ICD-10-CM

## 2023-11-08 DIAGNOSIS — R07.9 CHEST PAIN, UNSPECIFIED TYPE: Primary | ICD-10-CM

## 2023-11-08 DIAGNOSIS — I77.89 ASCENDING AORTA ENLARGEMENT: ICD-10-CM

## 2023-11-08 LAB
ALBUMIN SERPL-MCNC: 4 G/DL (ref 3.5–5.2)
ALBUMIN/GLOB SERPL: 1.4 G/DL
ALP SERPL-CCNC: 83 U/L (ref 39–117)
ALT SERPL W P-5'-P-CCNC: 20 U/L (ref 1–41)
ANION GAP SERPL CALCULATED.3IONS-SCNC: 10 MMOL/L (ref 5–15)
AST SERPL-CCNC: 24 U/L (ref 1–40)
BASOPHILS # BLD AUTO: 0.09 10*3/MM3 (ref 0–0.2)
BASOPHILS NFR BLD AUTO: 1 % (ref 0–1.5)
BILIRUB SERPL-MCNC: 0.9 MG/DL (ref 0–1.2)
BUN SERPL-MCNC: 9 MG/DL (ref 8–23)
BUN/CREAT SERPL: 11.4 (ref 7–25)
CALCIUM SPEC-SCNC: 9.5 MG/DL (ref 8.6–10.5)
CHLORIDE SERPL-SCNC: 106 MMOL/L (ref 98–107)
CO2 SERPL-SCNC: 24 MMOL/L (ref 22–29)
CREAT SERPL-MCNC: 0.79 MG/DL (ref 0.76–1.27)
D DIMER PPP FEU-MCNC: 0.73 MCGFEU/ML (ref 0–0.65)
DEPRECATED RDW RBC AUTO: 43 FL (ref 37–54)
EGFRCR SERPLBLD CKD-EPI 2021: 98.6 ML/MIN/1.73
EOSINOPHIL # BLD AUTO: 0.15 10*3/MM3 (ref 0–0.4)
EOSINOPHIL NFR BLD AUTO: 1.6 % (ref 0.3–6.2)
ERYTHROCYTE [DISTWIDTH] IN BLOOD BY AUTOMATED COUNT: 13.8 % (ref 12.3–15.4)
GLOBULIN UR ELPH-MCNC: 2.9 GM/DL
GLUCOSE SERPL-MCNC: 92 MG/DL (ref 65–99)
HCT VFR BLD AUTO: 27.5 % (ref 37.5–51)
HGB BLD-MCNC: 10 G/DL (ref 13–17.7)
IMM GRANULOCYTES # BLD AUTO: 0.03 10*3/MM3 (ref 0–0.05)
IMM GRANULOCYTES NFR BLD AUTO: 0.3 % (ref 0–0.5)
LYMPHOCYTES # BLD AUTO: 3.07 10*3/MM3 (ref 0.7–3.1)
LYMPHOCYTES NFR BLD AUTO: 32.6 % (ref 19.6–45.3)
MCH RBC QN AUTO: 31 PG (ref 26.6–33)
MCHC RBC AUTO-ENTMCNC: 36.4 G/DL (ref 31.5–35.7)
MCV RBC AUTO: 85.1 FL (ref 79–97)
MONOCYTES # BLD AUTO: 0.92 10*3/MM3 (ref 0.1–0.9)
MONOCYTES NFR BLD AUTO: 9.8 % (ref 5–12)
NEUTROPHILS NFR BLD AUTO: 5.15 10*3/MM3 (ref 1.7–7)
NEUTROPHILS NFR BLD AUTO: 54.7 % (ref 42.7–76)
NT-PROBNP SERPL-MCNC: 63.9 PG/ML (ref 0–900)
PLATELET # BLD AUTO: 253 10*3/MM3 (ref 140–450)
PMV BLD AUTO: 11.4 FL (ref 6–12)
POTASSIUM SERPL-SCNC: 3.8 MMOL/L (ref 3.5–5.2)
PROT SERPL-MCNC: 6.9 G/DL (ref 6–8.5)
RBC # BLD AUTO: 3.23 10*6/MM3 (ref 4.14–5.8)
SODIUM SERPL-SCNC: 140 MMOL/L (ref 136–145)
TROPONIN T SERPL HS-MCNC: 9 NG/L
WBC NRBC COR # BLD: 9.41 10*3/MM3 (ref 3.4–10.8)

## 2023-11-08 PROCEDURE — 25810000003 SODIUM CHLORIDE 0.9 % SOLUTION: Performed by: EMERGENCY MEDICINE

## 2023-11-08 PROCEDURE — 85379 FIBRIN DEGRADATION QUANT: CPT | Performed by: EMERGENCY MEDICINE

## 2023-11-08 PROCEDURE — 99284 EMERGENCY DEPT VISIT MOD MDM: CPT | Performed by: EMERGENCY MEDICINE

## 2023-11-08 PROCEDURE — 93005 ELECTROCARDIOGRAM TRACING: CPT | Performed by: EMERGENCY MEDICINE

## 2023-11-08 PROCEDURE — 85025 COMPLETE CBC W/AUTO DIFF WBC: CPT | Performed by: EMERGENCY MEDICINE

## 2023-11-08 PROCEDURE — 84484 ASSAY OF TROPONIN QUANT: CPT | Performed by: EMERGENCY MEDICINE

## 2023-11-08 PROCEDURE — 71045 X-RAY EXAM CHEST 1 VIEW: CPT

## 2023-11-08 PROCEDURE — 80053 COMPREHEN METABOLIC PANEL: CPT | Performed by: EMERGENCY MEDICINE

## 2023-11-08 PROCEDURE — 96360 HYDRATION IV INFUSION INIT: CPT

## 2023-11-08 PROCEDURE — 99285 EMERGENCY DEPT VISIT HI MDM: CPT

## 2023-11-08 PROCEDURE — 71275 CT ANGIOGRAPHY CHEST: CPT

## 2023-11-08 PROCEDURE — 83880 ASSAY OF NATRIURETIC PEPTIDE: CPT | Performed by: EMERGENCY MEDICINE

## 2023-11-08 PROCEDURE — 25510000001 IOPAMIDOL PER 1 ML: Performed by: EMERGENCY MEDICINE

## 2023-11-08 PROCEDURE — 93010 ELECTROCARDIOGRAM REPORT: CPT | Performed by: INTERNAL MEDICINE

## 2023-11-08 PROCEDURE — 96361 HYDRATE IV INFUSION ADD-ON: CPT

## 2023-11-08 RX ORDER — SODIUM CHLORIDE 0.9 % (FLUSH) 0.9 %
10 SYRINGE (ML) INJECTION AS NEEDED
Status: DISCONTINUED | OUTPATIENT
Start: 2023-11-08 | End: 2023-11-08 | Stop reason: HOSPADM

## 2023-11-08 RX ORDER — AZITHROMYCIN 250 MG/1
TABLET, FILM COATED ORAL
Qty: 6 TABLET | Refills: 0 | Status: SHIPPED | OUTPATIENT
Start: 2023-11-08

## 2023-11-08 RX ORDER — SODIUM CHLORIDE 9 MG/ML
125 INJECTION, SOLUTION INTRAVENOUS CONTINUOUS
Status: DISCONTINUED | OUTPATIENT
Start: 2023-11-08 | End: 2023-11-08 | Stop reason: HOSPADM

## 2023-11-08 RX ORDER — NITROGLYCERIN 0.4 MG/1
0.4 TABLET SUBLINGUAL
Qty: 30 TABLET | Refills: 0 | Status: SHIPPED | OUTPATIENT
Start: 2023-11-08

## 2023-11-08 RX ORDER — NITROGLYCERIN 0.4 MG/1
0.4 TABLET SUBLINGUAL
Status: DISCONTINUED | OUTPATIENT
Start: 2023-11-08 | End: 2023-11-08 | Stop reason: HOSPADM

## 2023-11-08 RX ORDER — ASPIRIN 325 MG
325 TABLET ORAL ONCE
Status: COMPLETED | OUTPATIENT
Start: 2023-11-08 | End: 2023-11-08

## 2023-11-08 RX ORDER — ASPIRIN 325 MG
325 TABLET, DELAYED RELEASE (ENTERIC COATED) ORAL DAILY
Qty: 21 TABLET | Refills: 0 | Status: SHIPPED | OUTPATIENT
Start: 2023-11-08

## 2023-11-08 RX ADMIN — SODIUM CHLORIDE 125 ML/HR: 9 INJECTION, SOLUTION INTRAVENOUS at 16:57

## 2023-11-08 RX ADMIN — ASPIRIN 325 MG: 325 TABLET ORAL at 16:57

## 2023-11-08 RX ADMIN — IOPAMIDOL 78 ML: 755 INJECTION, SOLUTION INTRAVENOUS at 17:39

## 2023-11-08 NOTE — FSED PROVIDER NOTE
"Subjective   History of Present Illness  62yo male pmh significant htn/hcv/hemoglobin SC/PE/pulmonary htn presents ED c/o 2d hx intermittent left sided chest discomfort characterized as \"dull\"/nonradiating/neg exac or relieve factors/neg associated symptoms; pt denies chest pain at time of exam.    History provided by:  Patient  Chest Pain  Pain location:  L chest  Pain quality: dull    Pain radiates to:  Does not radiate  Progression:  Resolved  Associated symptoms: no palpitations        Review of Systems   Constitutional: Negative.    HENT: Negative.     Eyes: Negative.    Respiratory: Negative.     Cardiovascular:  Positive for chest pain. Negative for palpitations and leg swelling.   Gastrointestinal: Negative.    Genitourinary: Negative.    Musculoskeletal: Negative.    Neurological:  Negative for syncope.   All other systems reviewed and are negative.      Past Medical History:   Diagnosis Date    Acid reflux     Allergic     Anxiety     Arthritis     Benign essential HTN 4/21/2017    BPH (benign prostatic hyperplasia)     DVT (deep venous thrombosis)     Eczema     Heart murmur     Hemorrhoid     Hepatitis C     history cleared    History of pneumonia     History of transfusion     Sickle cell anemia     Sinus problem     Sleep apnea     cpap    Tinnitus        Allergies   Allergen Reactions    Levofloxacin Swelling    Penicillins Swelling    Sulfamethoxazole-Trimethoprim Rash       Past Surgical History:   Procedure Laterality Date    CHOLECYSTECTOMY      COLONOSCOPY  2012    ENDOSCOPY  02/22/2005    HEMORRHOIDECTOMY N/A 6/23/2016    Procedure: HEMORRHOIDECTOMY;  Surgeon: Ct Recio MD;  Location: [facility];  Service:     LIPOMA EXCISION      LYMPHADENECTOMY      WHIPPLE PROCEDURE  07/26/2019    UL    WRIST SURGERY Right     fractured and had plate put in       Family History   Problem Relation Age of Onset    Heart attack Maternal Uncle        Social History     Socioeconomic History    Marital " status:    Tobacco Use    Smoking status: Former     Packs/day: 0.25     Years: 10.00     Additional pack years: 0.00     Total pack years: 2.50     Types: Cigarettes     Quit date: 1981     Years since quittin.4    Smokeless tobacco: Never   Vaping Use    Vaping Use: Never used   Substance and Sexual Activity    Alcohol use: No    Drug use: No    Sexual activity: Defer           Objective   Physical Exam  Vitals and nursing note reviewed.   Constitutional:       Appearance: Normal appearance. He is well-developed. He is not toxic-appearing or diaphoretic.   HENT:      Head: Normocephalic and atraumatic.      Right Ear: External ear normal.      Left Ear: External ear normal.      Nose: Nose normal.      Mouth/Throat:      Mouth: Mucous membranes are moist.   Eyes:      Pupils: Pupils are equal, round, and reactive to light.   Neck:      Vascular: No hepatojugular reflux or JVD.      Trachea: No tracheal deviation.   Cardiovascular:      Rate and Rhythm: Normal rate and regular rhythm.      Pulses: Normal pulses.      Heart sounds: Normal heart sounds. No murmur heard.     No friction rub. No gallop. No S3 or S4 sounds.   Pulmonary:      Effort: Pulmonary effort is normal.      Breath sounds: Normal breath sounds. No wheezing, rhonchi or rales.   Abdominal:      General: Bowel sounds are normal.      Palpations: Abdomen is soft. There is no mass.      Tenderness: There is no abdominal tenderness. There is no guarding or rebound.   Musculoskeletal:         General: Normal range of motion.      Cervical back: Normal range of motion and neck supple.      Right lower leg: No tenderness. No edema.      Left lower leg: No tenderness. No edema.   Lymphadenopathy:      Cervical: No cervical adenopathy.   Skin:     General: Skin is warm and dry.   Neurological:      General: No focal deficit present.      Mental Status: He is alert and oriented to person, place, and time.      GCS: GCS eye subscore is 4. GCS  "verbal subscore is 5. GCS motor subscore is 6.         ECG 12 Lead ED Triage Standing Order; Chest Pain      Date/Time: 11/8/2023 5:04 PM    Performed by: Jah Levine MD  Authorized by: Jah Levine MD  Interpreted by physician  Rhythm: sinus rhythm  Rate: normal  BPM: 67  QRS axis: left  Conduction: conduction normal  ST Segments: ST segments normal  T Waves: T waves normal  Other findings: LVH  Clinical impression: abnormal ECG               ED Course      Labs Reviewed   CBC WITH AUTO DIFFERENTIAL - Abnormal; Notable for the following components:       Result Value    RBC 3.23 (*)     Hemoglobin 10.0 (*)     Hematocrit 27.5 (*)     MCHC 36.4 (*)     Monocytes, Absolute 0.92 (*)     All other components within normal limits   D-DIMER, QUANTITATIVE - Abnormal; Notable for the following components:    D-Dimer, Quantitative 0.73 (*)     All other components within normal limits    Narrative:     According to the assay 's published package insert, a normal (<0.50 MCGFEU/mL) D-dimer result in conjunction with a non-high clinical probability assessment, excludes deep vein thrombosis (DVT) and pulmonary embolism (PE) with high sensitivity.    D-dimer values increase with age and this can make VTE exclusion of an older population difficult. To address this, the American College of [employer], based on best available evidence and recent guidelines, recommends that clinicians use age-adjusted D-dimer thresholds in patients greater than 50 years of age with: a) a low probability of PE who do not meet all Pulmonary Embolism Rule Out Criteria, or b) in those with intermediate probability of PE.   The formula for an age-adjusted D-dimer cut-off is \"age/100\".  For example, a 60 year old patient would have an age-adjusted cut-off of 0.60 MCGFEU/mL and an 80 year old 0.80 MCGFEU/mL.   BNP (IN-HOUSE) - Normal    Narrative:     This assay is used as an aid in the diagnosis of individuals suspected of having heart " failure. It can be used as an aid in the diagnosis of acute decompensated heart failure (ADHF) in patients presenting with signs and symptoms of ADHF to the emergency department (ED). In addition, NT-proBNP of <300 pg/mL indicates ADHF is not likely.    Age Range Result Interpretation  NT-proBNP Concentration (pg/mL:      <50             Positive            >450                   Gray                 300-450                    Negative             <300    50-75           Positive            >900                  Gray                300-900                  Negative            <300      >75             Positive            >1800                  Gray                300-1800                  Negative            <300   TROPONIN - Normal    Narrative:     High Sensitive Troponin T Reference Range:  <14.0 ng/L- Negative Female for AMI  <22.0 ng/L- Negative Male for AMI  >=14 - Abnormal Female indicating possible myocardial injury.  >=22 - Abnormal Male indicating possible myocardial injury.   Clinicians would have to utilize clinical acumen, EKG, Troponin, and serial changes to determine if it is an Acute Myocardial Infarction or myocardial injury due to an underlying chronic condition.        COMPREHENSIVE METABOLIC PANEL    Narrative:     GFR Normal >60  Chronic Kidney Disease <60  Kidney Failure <15     HIGH SENSITIVITIY TROPONIN T 2HR   CBC AND DIFFERENTIAL    Narrative:     The following orders were created for panel order CBC & Differential.  Procedure                               Abnormality         Status                     ---------                               -----------         ------                     CBC Auto Differential[[id number]]        Abnormal            Final result                 Please view results for these tests on the individual orders.     CT Angiogram Chest Pulmonary Embolism    Result Date: 11/8/2023  Narrative: CT ANGIOGRAM OF THE CHEST  HISTORY: Chest discomfort for 2 days  TECHNIQUE:  Radiation dose reduction techniques were utilized, including automated exposure control and exposure modulation based on body size. CT angiogram of the chest was performed following the administration of IV contrast. Multiple coronal, sagittal, and 3-D reconstruction images were obtained. PE protocol.  COMPARISON: Chest x-ray from today, CT of the abdomen and pelvis from 7/23/2023  FINDINGS: There is no evidence of pulmonary embolism. The heart is enlarged. There is no pericardial effusion. There is no suspicious lymphadenopathy. There is mild aneurysmal dilation of the ascending aorta measuring about 4.2 cm. There is no pleural effusion. Chronic fibrotic appearing changes are seen in the bases of both lungs. There is some minimal nodularity in the periphery of the right upper lobe, likely infectious/inflammatory. There is a tiny nodule in the right lower lobe on image 92. Limited imaging of the upper abdomen is unremarkable. No acute bony abnormality      Impression: 1. No evidence of pulmonary embolism 2. Chronic fibrotic appearing changes in the lung bases. Additional tiny nodules in the right lung are likely infectious/inflammatory. These could be followed up in 6-12 months to ensure clearing 3. Cardiomegaly 4. Mildly dilated ascending aorta 5. No pleural or pericardial effusion   Radiation dose reduction techniques were utilized, including automated exposure control and exposure modulation based on body size.   This report was finalized on 11/8/2023 5:48 PM by Dr. Austin Gomez M.D on Workstation: [id number]      XR Chest 1 View    Result Date: 11/8/2023  Narrative: AP CHEST  HISTORY: Chest pain  COMPARISON: None available  FINDINGS: There is blunting of the costophrenic angles bilaterally suggesting small bilateral pleural effusions. There is also some mild atelectasis or infiltrate in the lung bases. Heart size probably normal for technique. No pneumothorax.      Impression: Blunting of the costophrenic  angles bilaterally suggesting small bilateral pleural effusions    This report was finalized on 11/8/2023 5:03 PM by Dr. Austin Gomez M.D on Workstation: [id number]                                      HEART Score for Major Cardiac Events - MDCalc  Calculated on Nov 08 2023 5:53 PM  4 points -> Moderate Score (4-6 points) Risk of MACE of 12-16.6%.    Medical Decision Making  Labs/radiographic findings reviewed. CXR: bilateral costophrenic angle blunting.  EKG: NSR/rate 67/LAD/LVH/no acute STTW changes.  CBC/CMP: unremarkable.  hsTnT: neg x1.  D dimer(+).  CTPA: neg PE/incidental cardiomegaly, right sided pulmonary nodules likely inflammatory, ascending aorta enlargement.  HEART score=4.  Pt recommended for admit r/o ACS.  Pt requests discharge AMA.  Risks discussed including acs/mi/death.  Pt verbalized understanding; demonstrates decisional capacity.  All questions answered satisfactorily.  Welcomed return for any reason.  Plan asa 325mg po daily/prn ntg 0.4mg sl/z-pack as directed for pulmonary nodules.  Pmd/cardiology/thoracic surgery followup.  Return precautions provided.    Problems Addressed:  Ascending aorta enlargement: complicated acute illness or injury  Chest pain, unspecified type: complicated acute illness or injury  Pulmonary nodule, right: complicated acute illness or injury    Amount and/or Complexity of Data Reviewed  Labs: ordered.  Radiology: ordered.  ECG/medicine tests: ordered and independent interpretation performed.    Risk  OTC drugs.  Prescription drug management.        Final diagnoses:   Chest pain, unspecified type   Pulmonary nodule, right   Ascending aorta enlargement       ED Disposition  ED Disposition       ED Disposition   AMA    Condition   --    Comment   --               Felix Camacho MD PhD  [address] [address]  [phone]    Schedule an appointment as soon as possible for a visit       Henny Patrick MD  [address] [facility]  [id number] [phone]    In 1 day      Nalini Camara MD  [address]  [facility] [address]  [facility] [id number] [phone]    Schedule an appointment as soon as possible for a visit            Medication List        New Prescriptions      aspirin 325 MG EC tablet  Take 1 tablet by mouth Daily.     azithromycin 250 MG tablet  Commonly known as: ZITHROMAX  Take 2 tabs by mouth day one then take 1 tab po daily x4 days     nitroglycerin 0.4 MG SL tablet  Commonly known as: NITROSTAT  Place 1 tablet under the tongue Every 5 (Five) Minutes As Needed for Chest Pain. Take no more than 3 doses in 15 minutes.               Where to Get Your Medications        These medications were sent to [facility] #[id number] - [place], [place] - [address] AT [facility] [address] - [phone]  - [phone]   [address], [facility] [address]      Phone: [phone]   aspirin 325 MG EC tablet  azithromycin 250 MG tablet  nitroglycerin 0.4 MG SL tablet

## 2023-11-10 LAB
QT INTERVAL: 429 MS
QTC INTERVAL: 453 MS

## 2023-11-13 DIAGNOSIS — I10 BENIGN ESSENTIAL HTN: ICD-10-CM

## 2023-11-13 RX ORDER — AMLODIPINE BESYLATE 5 MG/1
5 TABLET ORAL DAILY
Qty: 30 TABLET | Refills: 0 | Status: SHIPPED | OUTPATIENT
Start: 2023-11-13

## 2023-11-13 RX ORDER — AMLODIPINE BESYLATE 5 MG/1
5 TABLET ORAL DAILY
Qty: 90 TABLET | OUTPATIENT
Start: 2023-11-13

## 2023-11-15 ENCOUNTER — OFFICE VISIT (OUTPATIENT)
Dept: SURGERY | Facility: CLINIC | Age: 63
End: 2023-11-15
Payer: MEDICARE

## 2023-11-15 VITALS
WEIGHT: 165 LBS | OXYGEN SATURATION: 96 % | HEART RATE: 77 BPM | BODY MASS INDEX: 23.62 KG/M2 | SYSTOLIC BLOOD PRESSURE: 132 MMHG | HEIGHT: 70 IN | DIASTOLIC BLOOD PRESSURE: 72 MMHG

## 2023-11-15 DIAGNOSIS — R91.1 SOLITARY PULMONARY NODULE: Primary | ICD-10-CM

## 2023-11-15 PROCEDURE — 3075F SYST BP GE 130 - 139MM HG: CPT | Performed by: STUDENT IN AN ORGANIZED HEALTH CARE EDUCATION/TRAINING PROGRAM

## 2023-11-15 PROCEDURE — 99204 OFFICE O/P NEW MOD 45 MIN: CPT | Performed by: STUDENT IN AN ORGANIZED HEALTH CARE EDUCATION/TRAINING PROGRAM

## 2023-11-15 PROCEDURE — 3078F DIAST BP <80 MM HG: CPT | Performed by: STUDENT IN AN ORGANIZED HEALTH CARE EDUCATION/TRAINING PROGRAM

## 2023-11-15 NOTE — PROGRESS NOTES
"Chief Complaint  No chief complaint on file.    Subjective        Henrry Glover presents to [facility] THORACIC SURGERY  History of Present Illness  Mr. Glover is a very pleasant 63-year-old gentleman who we were asked to see after a recent emergency department visit where he had a CT of the chest performed for chest pain.  During this CTA there was an incidentally found micronodule in the right lower lobe.  In addition he has some fibrotic changes in appearance on his CT scan of his lungs.  He is a previous smoker and stopped smoking over 35 years ago.  He has no other environmental exposures.  He denies any current symptoms.  He denies any shortness of breath, fevers and chills.  He denies any cough.  He denies any unintentional weight loss.  He denies any hemoptysis.  Objective   Vital Signs:  /72 (BP Location: Left arm, Patient Position: Sitting, Cuff Size: Adult)   Pulse 77   Ht 177.8 cm (70\")   Wt 74.8 kg (165 lb)   SpO2 96%   BMI 23.68 kg/m²   Estimated body mass index is 23.68 kg/m² as calculated from the following:    Height as of this encounter: 177.8 cm (70\").    Weight as of this encounter: 74.8 kg (165 lb).       BMI is within normal parameters. No other follow-up for BMI required.      Physical Exam  Constitutional:       Appearance: Normal appearance.   Cardiovascular:      Rate and Rhythm: Normal rate and regular rhythm.      Pulses: Normal pulses.      Heart sounds: Normal heart sounds.   Pulmonary:      Effort: Pulmonary effort is normal.      Breath sounds: Normal breath sounds.   Skin:     Capillary Refill: Capillary refill takes less than 2 seconds.   Neurological:      General: No focal deficit present.      Mental Status: He is alert and oriented to person, place, and time. Mental status is at baseline.   Psychiatric:         Mood and Affect: Mood normal.         Behavior: Behavior normal.         Thought Content: Thought content normal.         Judgment: Judgment " normal.        Result Review :  CT of the chest from 11/8/2023 was visualized and reviewed by myself.  I agree with the interpretation.           Assessment and Plan   Diagnoses and all orders for this visit:    1. Solitary pulmonary nodule (Primary)  -     CT Chest Without Contrast Diagnostic; Future    Mr. Glover is a very pleasant 63-year-old gentleman we are asked to see for an incidentally found micronodule in his right lower lobe on a CTA of his chest that was recently performed at an emergency department visit for chest pain.  He is currently asymptomatic.  He is considered low risk, he is an ex-smoker and stopped over 35 years ago.  He has no environmental exposures.  He did receive a short course of antibiotics by the emergency department and has needed those.  I would like to see him in approximately 6 months with a noncontrasted CT scan of the chest to evaluate for this nodule.    We will see him back in 6 months time with a noncontrasted CT scan of the chest.       I spent 45 minutes caring for Henrry on this date of service. This time includes time spent by me in the following activities:preparing for the visit, reviewing tests, obtaining and/or reviewing a separately obtained history, performing a medically appropriate examination and/or evaluation , counseling and educating the patient/family/caregiver, ordering medications, tests, or procedures, referring and communicating with other health care professionals , documenting information in the medical record, independently interpreting results and communicating that information with the patient/family/caregiver, and care coordination  Follow Up   No follow-ups on file.  Patient was given instructions and counseling regarding his condition or for health maintenance advice. Please see specific information pulled into the AVS if appropriate.

## 2023-11-18 ENCOUNTER — PATIENT ROUNDING (BHMG ONLY) (OUTPATIENT)
Dept: SURGERY | Facility: CLINIC | Age: 63
End: 2023-11-18
Payer: MEDICARE

## 2023-11-20 ENCOUNTER — TELEPHONE (OUTPATIENT)
Dept: FAMILY MEDICINE CLINIC | Facility: CLINIC | Age: 63
End: 2023-11-20
Payer: MEDICARE

## 2023-11-20 DIAGNOSIS — F41.9 ANXIETY: ICD-10-CM

## 2023-11-20 RX ORDER — LORAZEPAM 0.5 MG/1
TABLET ORAL
Qty: 25 TABLET | Refills: 0 | Status: SHIPPED | OUTPATIENT
Start: 2023-11-20

## 2023-11-20 NOTE — TELEPHONE ENCOUNTER
Patient wants to reschedule his initial evaluation appointment with Enrrique Moreno.  Please call to schedule.

## 2023-12-04 ENCOUNTER — OFFICE VISIT (OUTPATIENT)
Dept: FAMILY MEDICINE CLINIC | Facility: CLINIC | Age: 63
End: 2023-12-04
Payer: MEDICARE

## 2023-12-04 VITALS
WEIGHT: 158 LBS | SYSTOLIC BLOOD PRESSURE: 138 MMHG | HEIGHT: 70 IN | OXYGEN SATURATION: 98 % | HEART RATE: 76 BPM | RESPIRATION RATE: 18 BRPM | BODY MASS INDEX: 22.62 KG/M2 | DIASTOLIC BLOOD PRESSURE: 78 MMHG

## 2023-12-04 DIAGNOSIS — D57.1 SICKLING DISORDER DUE TO HEMOGLOBIN S WITHOUT CRISIS: ICD-10-CM

## 2023-12-04 DIAGNOSIS — F41.9 ANXIETY: Primary | ICD-10-CM

## 2023-12-04 DIAGNOSIS — R11.0 NAUSEA: ICD-10-CM

## 2023-12-04 DIAGNOSIS — I10 BENIGN ESSENTIAL HTN: ICD-10-CM

## 2023-12-04 DIAGNOSIS — D3A.8 PRIMARY PANCREATIC NEUROENDOCRINE TUMOR: ICD-10-CM

## 2023-12-04 PROCEDURE — 3078F DIAST BP <80 MM HG: CPT | Performed by: FAMILY MEDICINE

## 2023-12-04 PROCEDURE — 99214 OFFICE O/P EST MOD 30 MIN: CPT | Performed by: FAMILY MEDICINE

## 2023-12-04 PROCEDURE — 3075F SYST BP GE 130 - 139MM HG: CPT | Performed by: FAMILY MEDICINE

## 2023-12-04 RX ORDER — ONDANSETRON 4 MG/1
4 TABLET, FILM COATED ORAL EVERY 8 HOURS PRN
Qty: 30 TABLET | Refills: 0 | Status: SHIPPED | OUTPATIENT
Start: 2023-12-04

## 2023-12-04 RX ORDER — SERTRALINE HYDROCHLORIDE 25 MG/1
25 TABLET, FILM COATED ORAL DAILY
Qty: 30 TABLET | Refills: 1 | Status: SHIPPED | OUTPATIENT
Start: 2023-12-04

## 2023-12-04 NOTE — PROGRESS NOTES
Assessment and Plan     Assessment & Plan  Anxiety  Begin Sertraline. Will discuss with Enrrique.  Nausea  Occasional and prob re to the stint he needs removed.  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 He will recheck in six months. He will continue present meds. Prescription will be sent when notified by pharmacy..           Henrry is a 63 y.o. being seen today for  Hypertension and Anxiety   HISTORY    HPI  Having some nasal congestion and is losing weight. Using a nasal decongestant at least twice daily.    Had Norovirus. Answers submitted by the patient for this visit:  Please describe your symptoms.: Weight loss.  Have you had these symptoms before?: No  How long have you been having these symptoms?: Greater than 2 weeks  Please list any medications you are currently taking for this condition.: None.  Please describe any probable cause for these symptoms. : Change in diet and also the Noro virus.    Patient is also very anxious about his health.  He was supposed to see on our psychiatric nurse practitioner and was feeling somewhat better so he canceled the appointment and now that appointment is out until April.  We did discuss calling [facility] to see if he could be seen sooner.  He was unable to tolerate the Pristiq although I am uncertain why.  We discussed the use of sertraline and will try that.  He understands he is to take that every day and that he may not see any improvement for 4 to 6 weeks after beginning it.    Social History  He  reports that he quit smoking about 42 years ago. His smoking use included cigarettes. He has a 2.50 pack-year smoking history. He has never used smokeless tobacco. He reports that he does not drink alcohol and does not use drugs.  EXAM DATA    Vital Signs        BP Readings from Last 1 Encounters:   12/04/23 138/78      Wt Readings from Last 3 Encounters:   12/04/23 71.7 kg (158 lb)   11/15/23 74.8 kg (165 lb)   11/08/23 74.8 kg (165 lb)   Body mass index is 22.67 kg/m².   Physical Exam  Vitals reviewed.   Constitutional:       Appearance: Normal appearance. He is well-developed.   Neurological:      Mental Status: He is alert.   Psychiatric:         Behavior: Behavior normal.         Thought Content: Thought content normal.         Judgment: Judgment normal.       BMI is within normal parameters. No other follow-up for BMI required.               Patient was given instructions and counseling regarding his condition or for health maintenance advice. Please see specific information pulled into the AVS if appropriate.

## 2023-12-05 NOTE — ASSESSMENT & PLAN NOTE
Fili 12/4/2023  BP is controlled well. He will recheck in six months. He will continue present meds. Prescription will be sent when notified by pharmacy..

## 2023-12-10 DIAGNOSIS — I10 BENIGN ESSENTIAL HTN: ICD-10-CM

## 2023-12-11 RX ORDER — AMLODIPINE BESYLATE 5 MG/1
5 TABLET ORAL DAILY
Qty: 90 TABLET | Refills: 1 | Status: SHIPPED | OUTPATIENT
Start: 2023-12-11

## 2023-12-21 ENCOUNTER — TELEPHONE (OUTPATIENT)
Dept: FAMILY MEDICINE CLINIC | Facility: CLINIC | Age: 63
End: 2023-12-21

## 2023-12-21 NOTE — TELEPHONE ENCOUNTER
Caller: Henrry Glover    Relationship: Self    Best call back number: [phone]    What was the call regarding: PATIENT CALLED AND STATED THAT HE HAD A SLEEP STUDY DONE A FEW YEARS AGO AND THEY DETERMINED THAT HE NEEDED A CPAP MACHINE. PATIENT STATES THAT HE MASK HE CHOSE WAS UNCOMFORTABLE AND CHOSE TO STOP WEARING THIS. PATIENT STATES THAT HE BELIEVES HE MAY NEED ONE NOW. PATIENT IS UNSURE IF HE NEEDS TO HAVE ANOTHER SLEEP STUDY DONE, OR IF DR. CASTRO CAN ORDER THIS FOR HIM.    PLEASE ADVISE PATIENT ON NEXT STEPS OF CARE ASAP.

## 2024-01-02 DIAGNOSIS — L20.9 ATOPIC DERMATITIS, UNSPECIFIED TYPE: ICD-10-CM

## 2024-01-02 RX ORDER — TRIAMCINOLONE ACETONIDE 0.25 MG/G
CREAM TOPICAL
Qty: 240 G | Refills: 0 | Status: SHIPPED | OUTPATIENT
Start: 2024-01-02

## 2024-01-06 DIAGNOSIS — F41.9 ANXIETY: ICD-10-CM

## 2024-01-08 RX ORDER — SERTRALINE HYDROCHLORIDE 25 MG/1
25 TABLET, FILM COATED ORAL DAILY
Qty: 30 TABLET | Refills: 1 | Status: SHIPPED | OUTPATIENT
Start: 2024-01-08

## 2024-01-11 NOTE — PROGRESS NOTES
[facility]  [address]  [address]  [place] [address]  Phone   Fax       Henrry Glover  [id number]   1960  63 y.o.  male      PCP:Henny Patrick MD    Type of service: Initial New Patient Office Visit  Date of service: 1/12/2024          CHIEF COMPLAINT: Obstructive sleep apnea      HISTORY OF PRESENT ILLNESS:  Henrry Glover 63 y.o.  presents to the clinic today as a new patient to me for suspected sleep apnea.  He was diagnosed with mild obstructive sleep apnea 5/2016 and given his excessive daytime fatigue auto CPAP was recommended.  His weight was 208 pounds at this time.  He had a hard time with the mask at that time so only use CPAP very short time.  He has frequent nighttime awakenings and still feels he has sleep apnea contributing to his sleeping difficulties.  He would like to try the CPAP again.  We discussed repeat testing to requalify.        PATIENT HISTORY:  Sleep schedule:  Bedtime: 10:30 PM to 3 AM  Wake time: 3:30 AM to 9 AM  Time it takes to fall asleep: A few minutes  Average hours of sleep: Maybe 4 hours due to his frequent nighttime awakenings?  Number of naps per day: 0    Symptoms:  In addition to the above, patient reports:   Have you ever awakened gasping for breath, coughing, choking: No   Change in weight:   20  Morning headaches:  No   Awaken with a sore throat or dry mouth:  Yes   Leg jerking at night:  No   Creepy crawly feeling in legs/urge to move legs: No   Teeth grinding: No   Have you ever awakened at night with a sour taste or burning sensation in your chest:  No   Do you have muscle weakness with laughing or anger:  No   Have you ever felt paralyzed while going to sleep or waking up:  No   Sleepwalking: No   Nightmares: No   Nocturia (urination at night): 3-4 times per night  Memory Problem: No     Past medical history: (Relevant to sleep medicine)  Anxiety  Hypertension  Acid reflux  Arthritis      Social history:  Do  "you drive a commercial vehicle:  No   Shift work:  No   Tobacco use: Former, ages [age]  Alcohol use:  0 per week  Caffeinated drinks: 0-1 per day      Family history (parents, siblings, children) (relevant to sleep medicine):  No relevant    Medications: reviewed     ALLERGIES: Levofloxacin, Penicillins, Oxycodone-acetaminophen, and Sulfamethoxazole-trimethoprim        REVIEW OF SYSTEMS:  Full review of systems available on the intake form which is scanned in the media tab.  The relevant positives are noted below:  [place] Sleepiness Scale: Total score: 9   Fatigue  Snoring  Frequent urination  Nasal congestion  Anxiety--- following with PCP        PHYSICAL EXAM:  Vitals:    01/12/24 1109   Pulse: 67   SpO2: 97%   Weight: 72.6 kg (160 lb)   Height: 177.8 cm (70\")    Body mass index is 22.96 kg/m². Neck Circumference: 14.5 inches  HEAD: atraumatic, normocephalic  THROAT: tonsils are not enlarged, Mallampati class II  NECK: Neck Circumference: 14.5 inches, trachea is in the midline  RESPIRATORY SYSTEM: Respirations even, unlabored, normal rate  CARDIOVASULAR SYSTEM: Normal rate, no edema   EXTREMITES: No cyanosis or clubbing  NEUROLOGICAL SYSTEM: Alert and oriented x 3    Office notes from care team reviewed. Office note(s) reviewed: 5/4/2016 sleep medicine note, 12/5/2023 PCP note      Labs/ Test Results Reviewed:      11/2023 proBNP          ASSESSMENT AND PLAN:   Obstructive sleep apnea: patient's symptoms and physical examination are concerning for possible sleep apnea (G47.30).   I discussed the signs, symptoms, and pathophysiology of sleep apnea with this patient.  I also discussed the potential complications of untreated sleep apnea including but not limited to resistant hypertension, insulin resistance, pulmonary hypertension, atrial fibrillation, stroke, nonrestorative sleep with hypersomnia which can increase risk for motor vehicle accidents, etc.   Different testing methods including home-based and lab " based sleep studies were discussed with this patient.   Based on patient history and physical examination, will proceed with home sleep study, per patient preference.  The order for the sleep study is placed in [facility].  The test will be scheduled after prior authorization has been obtained through patient's insurance.  Treatment and management will be discussed in more detail with this patient after the test is completed.  All questions were answered to patient's satisfaction.  Patient would be amenable to in lab study if unable to make diagnosis from home study.  Snoring: snoring is the sound created by turbulent airflow vibrating upper airway soft tissue.  I have also discussed factors affecting snoring including sleep deprivation, sleeping on the back and alcohol ingestion. To minimize snoring, patient is advised to have adequate sleep, sleep on their side, and avoid alcohol and sedative medications around bedtime.   Excessive daytime fatigue: [place] Sleepiness Scale of Total score: 9.  There are many causes of excessive daytime sleepiness.  Rule out sleep apnea as a contributing factor, as above.  Do not drive, operate heavy machinery, or do activities that require high concentration if feeling tired/drowsy.  Trouble sleeping: We discussed that most people need 7 to 8 hours of sleep per night.  Recommend trying to have more regular sleep and wake times to help with overall sleep.  Rule out sleep apnea as contributing factor.  Consider referral to Dr. Kris Bradford for CBT-I if no sleep apnea.  Hypertension  Anxiety disorder  Primary pancreatic neuroendocrine tumor: [facility]aged by [facility]  Sickling disorder due to hemoglobin S: Follows with [facility]    Patient will follow-up after study, 31 to 90 days after PAP therapy initiated if applicable, or sooner for issues or concerns.  Patient's questions were answered.        Thank you for allowing me to participate in the care of this patient.    Bing Lowe DNP,  JENIFER  [facility] Sleep Medicine

## 2024-01-12 ENCOUNTER — OFFICE VISIT (OUTPATIENT)
Dept: SLEEP MEDICINE | Facility: HOSPITAL | Age: 64
End: 2024-01-12
Payer: MEDICARE

## 2024-01-12 VITALS — BODY MASS INDEX: 22.9 KG/M2 | HEART RATE: 67 BPM | HEIGHT: 70 IN | WEIGHT: 160 LBS | OXYGEN SATURATION: 97 %

## 2024-01-12 DIAGNOSIS — G47.33 OBSTRUCTIVE SLEEP APNEA, ADULT: Primary | ICD-10-CM

## 2024-01-12 DIAGNOSIS — G47.19 EXCESSIVE DAYTIME SLEEPINESS: ICD-10-CM

## 2024-01-12 PROCEDURE — G0463 HOSPITAL OUTPT CLINIC VISIT: HCPCS

## 2024-01-19 ENCOUNTER — TELEPHONE (OUTPATIENT)
Dept: FAMILY MEDICINE CLINIC | Facility: CLINIC | Age: 64
End: 2024-01-19

## 2024-01-19 NOTE — TELEPHONE ENCOUNTER
Caller: Henrry Glover     Relationship: [unfilled]     Best call back number: [phone]     What is your medical concern? PATIENT CALLING STATING THAT HE HAS SINUS CONGESTION / FATIGUE HE WOULD LIKE TO KNOW WHAT HE CAN TAKE FOR THE SYMPTOMS     How long has this issue been going on? STARTED OVER NIGHT    Is your provider already aware of this issue? N/A    Have you been treated for this issue? N/A

## 2024-01-20 DIAGNOSIS — J45.909 ASTHMA, UNSPECIFIED ASTHMA SEVERITY, UNSPECIFIED WHETHER COMPLICATED, UNSPECIFIED WHETHER PERSISTENT: ICD-10-CM

## 2024-01-22 RX ORDER — MONTELUKAST SODIUM 10 MG/1
TABLET ORAL
Qty: 90 TABLET | Refills: 2 | Status: SHIPPED | OUTPATIENT
Start: 2024-01-22

## 2024-01-25 ENCOUNTER — APPOINTMENT (OUTPATIENT)
Dept: GENERAL RADIOLOGY | Facility: HOSPITAL | Age: 64
DRG: 812 | End: 2024-01-25
Payer: MEDICARE

## 2024-01-25 ENCOUNTER — HOSPITAL ENCOUNTER (INPATIENT)
Facility: HOSPITAL | Age: 64
LOS: 2 days | Discharge: HOME | DRG: 812 | End: 2024-01-27
Attending: EMERGENCY MEDICINE | Admitting: INTERNAL MEDICINE
Payer: MEDICARE

## 2024-01-25 DIAGNOSIS — M25.562 ACUTE PAIN OF LEFT KNEE: ICD-10-CM

## 2024-01-25 DIAGNOSIS — D57.00 SICKLE-CELL DISEASE WITH PAIN: Primary | ICD-10-CM

## 2024-01-25 DIAGNOSIS — D72.829 LEUKOCYTOSIS, UNSPECIFIED TYPE: ICD-10-CM

## 2024-01-25 PROBLEM — D57.1 SICKLE CELL ANEMIA: Status: ACTIVE | Noted: 2024-01-25

## 2024-01-25 LAB
ALBUMIN SERPL-MCNC: 4.3 G/DL (ref 3.5–5.2)
ALBUMIN/GLOB SERPL: 1.3 G/DL
ALP SERPL-CCNC: 101 U/L (ref 39–117)
ALT SERPL W P-5'-P-CCNC: 24 U/L (ref 1–41)
ANION GAP SERPL CALCULATED.3IONS-SCNC: 10.1 MMOL/L (ref 5–15)
AST SERPL-CCNC: 25 U/L (ref 1–40)
BASOPHILS # BLD AUTO: 0.13 10*3/MM3 (ref 0–0.2)
BASOPHILS NFR BLD AUTO: 0.9 % (ref 0–1.5)
BILIRUB SERPL-MCNC: 0.8 MG/DL (ref 0–1.2)
BUN SERPL-MCNC: 8 MG/DL (ref 8–23)
BUN/CREAT SERPL: 10.7 (ref 7–25)
CALCIUM SPEC-SCNC: 9.5 MG/DL (ref 8.6–10.5)
CHLORIDE SERPL-SCNC: 107 MMOL/L (ref 98–107)
CO2 SERPL-SCNC: 25.9 MMOL/L (ref 22–29)
CREAT SERPL-MCNC: 0.75 MG/DL (ref 0.76–1.27)
DEPRECATED RDW RBC AUTO: 48.9 FL (ref 37–54)
EGFRCR SERPLBLD CKD-EPI 2021: 100.1 ML/MIN/1.73
EOSINOPHIL # BLD AUTO: 0.2 10*3/MM3 (ref 0–0.4)
EOSINOPHIL NFR BLD AUTO: 1.4 % (ref 0.3–6.2)
ERYTHROCYTE [DISTWIDTH] IN BLOOD BY AUTOMATED COUNT: 14.2 % (ref 12.3–15.4)
GLOBULIN UR ELPH-MCNC: 3.2 GM/DL
GLUCOSE SERPL-MCNC: 90 MG/DL (ref 65–99)
HCT VFR BLD AUTO: 33.6 % (ref 37.5–51)
HGB BLD-MCNC: 10.9 G/DL (ref 13–17.7)
LYMPHOCYTES # BLD AUTO: 2.83 10*3/MM3 (ref 0.7–3.1)
LYMPHOCYTES NFR BLD AUTO: 19.9 % (ref 19.6–45.3)
MCH RBC QN AUTO: 30.9 PG (ref 26.6–33)
MCHC RBC AUTO-ENTMCNC: 32.4 G/DL (ref 31.5–35.7)
MCV RBC AUTO: 95.2 FL (ref 79–97)
MONOCYTES # BLD AUTO: 1.13 10*3/MM3 (ref 0.1–0.9)
MONOCYTES NFR BLD AUTO: 7.9 % (ref 5–12)
NEUTROPHILS NFR BLD AUTO: 68.8 % (ref 42.7–76)
NEUTROPHILS NFR BLD AUTO: 9.8 10*3/MM3 (ref 1.7–7)
PLATELET # BLD AUTO: 257 10*3/MM3 (ref 140–450)
PMV BLD AUTO: 11.9 FL (ref 6–12)
POTASSIUM SERPL-SCNC: 4.4 MMOL/L (ref 3.5–5.2)
PROT SERPL-MCNC: 7.5 G/DL (ref 6–8.5)
RBC # BLD AUTO: 3.53 10*6/MM3 (ref 4.14–5.8)
RETICS # AUTO: 0.15 10*6/MM3 (ref 0.02–0.13)
RETICS/RBC NFR AUTO: 4.2 % (ref 0.7–1.9)
SODIUM SERPL-SCNC: 143 MMOL/L (ref 136–145)
URATE SERPL-MCNC: 6.2 MG/DL (ref 3.4–7)
WBC NRBC COR # BLD AUTO: 14.25 10*3/MM3 (ref 3.4–10.8)

## 2024-01-25 PROCEDURE — 25010000002 MORPHINE PER 10 MG: Performed by: EMERGENCY MEDICINE

## 2024-01-25 PROCEDURE — 25010000002 KETOROLAC TROMETHAMINE PER 15 MG: Performed by: PHYSICIAN ASSISTANT

## 2024-01-25 PROCEDURE — 84550 ASSAY OF BLOOD/URIC ACID: CPT | Performed by: EMERGENCY MEDICINE

## 2024-01-25 PROCEDURE — 25010000002 ONDANSETRON PER 1 MG: Performed by: PHYSICIAN ASSISTANT

## 2024-01-25 PROCEDURE — 85025 COMPLETE CBC W/AUTO DIFF WBC: CPT | Performed by: PHYSICIAN ASSISTANT

## 2024-01-25 PROCEDURE — 99285 EMERGENCY DEPT VISIT HI MDM: CPT

## 2024-01-25 PROCEDURE — 73560 X-RAY EXAM OF KNEE 1 OR 2: CPT

## 2024-01-25 PROCEDURE — 80053 COMPREHEN METABOLIC PANEL: CPT | Performed by: PHYSICIAN ASSISTANT

## 2024-01-25 PROCEDURE — 25810000003 SODIUM CHLORIDE 0.9 % SOLUTION: Performed by: INTERNAL MEDICINE

## 2024-01-25 PROCEDURE — 85045 AUTOMATED RETICULOCYTE COUNT: CPT | Performed by: PHYSICIAN ASSISTANT

## 2024-01-25 PROCEDURE — 25010000002 HYDROMORPHONE PER 4 MG: Performed by: INTERNAL MEDICINE

## 2024-01-25 PROCEDURE — 25810000003 SODIUM CHLORIDE 0.9 % SOLUTION: Performed by: PHYSICIAN ASSISTANT

## 2024-01-25 PROCEDURE — 25010000002 ONDANSETRON PER 1 MG: Performed by: INTERNAL MEDICINE

## 2024-01-25 RX ORDER — ONDANSETRON 2 MG/ML
4 INJECTION INTRAMUSCULAR; INTRAVENOUS EVERY 6 HOURS PRN
Status: DISCONTINUED | OUTPATIENT
Start: 2024-01-25 | End: 2024-01-27 | Stop reason: HOSPADM

## 2024-01-25 RX ORDER — POLYETHYLENE GLYCOL 3350 17 G/17G
17 POWDER, FOR SOLUTION ORAL DAILY PRN
Status: DISCONTINUED | OUTPATIENT
Start: 2024-01-25 | End: 2024-01-27 | Stop reason: HOSPADM

## 2024-01-25 RX ORDER — SODIUM CHLORIDE 0.9 % (FLUSH) 0.9 %
10 SYRINGE (ML) INJECTION AS NEEDED
Status: DISCONTINUED | OUTPATIENT
Start: 2024-01-25 | End: 2024-01-27 | Stop reason: HOSPADM

## 2024-01-25 RX ORDER — NITROGLYCERIN 0.4 MG/1
0.4 TABLET SUBLINGUAL
Status: DISCONTINUED | OUTPATIENT
Start: 2024-01-25 | End: 2024-01-27 | Stop reason: HOSPADM

## 2024-01-25 RX ORDER — DIPHENOXYLATE HYDROCHLORIDE AND ATROPINE SULFATE 2.5; .025 MG/1; MG/1
1 TABLET ORAL DAILY
Status: DISCONTINUED | OUTPATIENT
Start: 2024-01-25 | End: 2024-01-27 | Stop reason: HOSPADM

## 2024-01-25 RX ORDER — ACETAMINOPHEN 325 MG/1
650 TABLET ORAL EVERY 4 HOURS PRN
Status: DISCONTINUED | OUTPATIENT
Start: 2024-01-25 | End: 2024-01-27 | Stop reason: HOSPADM

## 2024-01-25 RX ORDER — ACETAMINOPHEN 650 MG/1
650 SUPPOSITORY RECTAL EVERY 4 HOURS PRN
Status: DISCONTINUED | OUTPATIENT
Start: 2024-01-25 | End: 2024-01-27 | Stop reason: HOSPADM

## 2024-01-25 RX ORDER — MONTELUKAST SODIUM 10 MG/1
10 TABLET ORAL NIGHTLY
Status: DISCONTINUED | OUTPATIENT
Start: 2024-01-25 | End: 2024-01-27 | Stop reason: HOSPADM

## 2024-01-25 RX ORDER — BENZONATATE 100 MG/1
200 CAPSULE ORAL 3 TIMES DAILY PRN
Status: DISCONTINUED | OUTPATIENT
Start: 2024-01-25 | End: 2024-01-27 | Stop reason: HOSPADM

## 2024-01-25 RX ORDER — BISACODYL 10 MG
10 SUPPOSITORY, RECTAL RECTAL DAILY PRN
Status: DISCONTINUED | OUTPATIENT
Start: 2024-01-25 | End: 2024-01-27 | Stop reason: HOSPADM

## 2024-01-25 RX ORDER — LORAZEPAM 0.5 MG/1
0.5 TABLET ORAL EVERY 8 HOURS PRN
Status: DISCONTINUED | OUTPATIENT
Start: 2024-01-25 | End: 2024-01-27 | Stop reason: HOSPADM

## 2024-01-25 RX ORDER — AMLODIPINE BESYLATE 5 MG/1
5 TABLET ORAL DAILY
Status: DISCONTINUED | OUTPATIENT
Start: 2024-01-25 | End: 2024-01-27 | Stop reason: HOSPADM

## 2024-01-25 RX ORDER — ACETAMINOPHEN 160 MG/5ML
650 SOLUTION ORAL EVERY 4 HOURS PRN
Status: DISCONTINUED | OUTPATIENT
Start: 2024-01-25 | End: 2024-01-27 | Stop reason: HOSPADM

## 2024-01-25 RX ORDER — SODIUM CHLORIDE 9 MG/ML
40 INJECTION, SOLUTION INTRAVENOUS AS NEEDED
Status: DISCONTINUED | OUTPATIENT
Start: 2024-01-25 | End: 2024-01-27 | Stop reason: HOSPADM

## 2024-01-25 RX ORDER — NALOXONE HCL 0.4 MG/ML
0.4 VIAL (ML) INJECTION
Status: DISCONTINUED | OUTPATIENT
Start: 2024-01-25 | End: 2024-01-26

## 2024-01-25 RX ORDER — SODIUM CHLORIDE 9 MG/ML
75 INJECTION, SOLUTION INTRAVENOUS CONTINUOUS
Status: DISCONTINUED | OUTPATIENT
Start: 2024-01-25 | End: 2024-01-27 | Stop reason: HOSPADM

## 2024-01-25 RX ORDER — ONDANSETRON 2 MG/ML
4 INJECTION INTRAMUSCULAR; INTRAVENOUS ONCE
Status: COMPLETED | OUTPATIENT
Start: 2024-01-25 | End: 2024-01-25

## 2024-01-25 RX ORDER — ASCORBIC ACID 500 MG
500 TABLET ORAL DAILY
Status: DISCONTINUED | OUTPATIENT
Start: 2024-01-25 | End: 2024-01-27 | Stop reason: HOSPADM

## 2024-01-25 RX ORDER — AMOXICILLIN 250 MG
2 CAPSULE ORAL 2 TIMES DAILY
Status: DISCONTINUED | OUTPATIENT
Start: 2024-01-25 | End: 2024-01-27 | Stop reason: HOSPADM

## 2024-01-25 RX ORDER — PANTOPRAZOLE SODIUM 40 MG/1
40 TABLET, DELAYED RELEASE ORAL DAILY
Status: DISCONTINUED | OUTPATIENT
Start: 2024-01-25 | End: 2024-01-27 | Stop reason: HOSPADM

## 2024-01-25 RX ORDER — MORPHINE SULFATE 2 MG/ML
4 INJECTION, SOLUTION INTRAMUSCULAR; INTRAVENOUS ONCE
Status: COMPLETED | OUTPATIENT
Start: 2024-01-25 | End: 2024-01-25

## 2024-01-25 RX ORDER — BISACODYL 5 MG/1
5 TABLET, DELAYED RELEASE ORAL DAILY PRN
Status: DISCONTINUED | OUTPATIENT
Start: 2024-01-25 | End: 2024-01-27 | Stop reason: HOSPADM

## 2024-01-25 RX ORDER — CETIRIZINE HYDROCHLORIDE 10 MG/1
10 TABLET ORAL DAILY
Status: DISCONTINUED | OUTPATIENT
Start: 2024-01-25 | End: 2024-01-27 | Stop reason: HOSPADM

## 2024-01-25 RX ORDER — KETOROLAC TROMETHAMINE 15 MG/ML
15 INJECTION, SOLUTION INTRAMUSCULAR; INTRAVENOUS ONCE
Status: COMPLETED | OUTPATIENT
Start: 2024-01-25 | End: 2024-01-25

## 2024-01-25 RX ORDER — HYDROCODONE BITARTRATE AND ACETAMINOPHEN 5; 325 MG/1; MG/1
1 TABLET ORAL EVERY 4 HOURS PRN
Status: DISCONTINUED | OUTPATIENT
Start: 2024-01-25 | End: 2024-01-27 | Stop reason: HOSPADM

## 2024-01-25 RX ORDER — SODIUM CHLORIDE 0.9 % (FLUSH) 0.9 %
10 SYRINGE (ML) INJECTION EVERY 12 HOURS SCHEDULED
Status: DISCONTINUED | OUTPATIENT
Start: 2024-01-25 | End: 2024-01-27 | Stop reason: HOSPADM

## 2024-01-25 RX ORDER — TAMSULOSIN HYDROCHLORIDE 0.4 MG/1
0.4 CAPSULE ORAL DAILY
Status: DISCONTINUED | OUTPATIENT
Start: 2024-01-25 | End: 2024-01-26

## 2024-01-25 RX ORDER — SERTRALINE HYDROCHLORIDE 25 MG/1
25 TABLET, FILM COATED ORAL DAILY
Status: DISCONTINUED | OUTPATIENT
Start: 2024-01-25 | End: 2024-01-27 | Stop reason: HOSPADM

## 2024-01-25 RX ORDER — TRIAMCINOLONE ACETONIDE 1 MG/G
1 CREAM TOPICAL 3 TIMES DAILY
Status: DISCONTINUED | OUTPATIENT
Start: 2024-01-25 | End: 2024-01-27 | Stop reason: HOSPADM

## 2024-01-25 RX ORDER — HYDROMORPHONE HYDROCHLORIDE 1 MG/ML
0.5 INJECTION, SOLUTION INTRAMUSCULAR; INTRAVENOUS; SUBCUTANEOUS
Status: DISCONTINUED | OUTPATIENT
Start: 2024-01-25 | End: 2024-01-26

## 2024-01-25 RX ADMIN — HYDROMORPHONE HYDROCHLORIDE 0.5 MG: 1 INJECTION, SOLUTION INTRAMUSCULAR; INTRAVENOUS; SUBCUTANEOUS at 22:51

## 2024-01-25 RX ADMIN — Medication 10 ML: at 20:12

## 2024-01-25 RX ADMIN — HYDROMORPHONE HYDROCHLORIDE 0.5 MG: 1 INJECTION, SOLUTION INTRAMUSCULAR; INTRAVENOUS; SUBCUTANEOUS at 20:11

## 2024-01-25 RX ADMIN — SODIUM CHLORIDE 125 ML/HR: 9 INJECTION, SOLUTION INTRAVENOUS at 18:07

## 2024-01-25 RX ADMIN — SODIUM CHLORIDE 500 ML: 9 INJECTION, SOLUTION INTRAVENOUS at 13:24

## 2024-01-25 RX ADMIN — ONDANSETRON HYDROCHLORIDE 4 MG: 2 INJECTION, SOLUTION INTRAMUSCULAR; INTRAVENOUS at 15:02

## 2024-01-25 RX ADMIN — MONTELUKAST SODIUM 10 MG: 10 TABLET, FILM COATED ORAL at 20:12

## 2024-01-25 RX ADMIN — ONDANSETRON HYDROCHLORIDE 4 MG: 2 INJECTION, SOLUTION INTRAMUSCULAR; INTRAVENOUS at 18:10

## 2024-01-25 RX ADMIN — HYDROMORPHONE HYDROCHLORIDE 0.5 MG: 1 INJECTION, SOLUTION INTRAMUSCULAR; INTRAVENOUS; SUBCUTANEOUS at 17:56

## 2024-01-25 RX ADMIN — KETOROLAC TROMETHAMINE 15 MG: 15 INJECTION, SOLUTION INTRAMUSCULAR; INTRAVENOUS at 13:25

## 2024-01-25 RX ADMIN — SENNOSIDES AND DOCUSATE SODIUM 2 TABLET: 50; 8.6 TABLET ORAL at 20:12

## 2024-01-25 RX ADMIN — MORPHINE SULFATE 4 MG: 2 INJECTION, SOLUTION INTRAMUSCULAR; INTRAVENOUS at 14:51

## 2024-01-25 NOTE — ED PROVIDER NOTES
EMERGENCY DEPARTMENT MD ATTESTATION NOTE    SHARED VISIT: This visit was performed by BOTH a physician and an APC. The substantive portion of the medical decision making was performed by this attesting physician who made or approved the management plan and takes responsibility for patient management. All studies documented in the APC note (if performed) were independently interpreted by me.    The TEZ and I have discussed this patient's history, physical exam, MDM, and treatment plan.  I have reviewed the documentation and personally had a face to face interaction with the patient. The attached note describes my personal findings.        Room Number:  P485/1  PCP: Henny Patrick MD  Independent Historians: Patient    HPI:  A complete HPI/ROS/PMH/PSH/SH/FH are unobtainable due to: None    Chronic or social conditions impacting patient care (Social Determinants of Health): None      Context: Henrry Glover is a 63 y.o. male with a medical history of sickle cell disease who presents to the ED c/o acute left knee pain.  The patient reports that he developed pain in his left knee this morning.  He states he has never had similar episodes in the past.  He does report a history of sickle cell disease.  He reports no trauma.  He reports the pain is severe.  It is worse with movement.  He has not had any treatment for his symptoms.        Review of prior external notes (non-ED) -and- Review of prior external test results outside of this encounter:  Laboratory evaluation 11/8/2023 shows normal CMP, normal troponin    Prescription drug monitoring program review:           PHYSICAL EXAM    I have reviewed the triage vital signs and nursing notes.    ED Triage Vitals   Temp Heart Rate Resp BP SpO2   01/25/24 1140 01/25/24 1140 01/25/24 1140 01/25/24 1145 01/25/24 1140   98.3 °F (36.8 °C) 65 16 142/72 96 %      Temp src Heart Rate Source Patient Position BP Location FiO2 (%)   01/25/24 1140 01/25/24 1140 -- -- --   Tympanic  Monitor          Physical Exam  GENERAL: Awake, alert, appears uncomfortable  SKIN: Warm, dry  HENT: Normocephalic, atraumatic  EYES: no scleral icterus  CV: regular rhythm, regular rate  RESPIRATORY: normal effort, lungs clear  ABDOMEN: soft, nontender, nondistended  MUSCULOSKELETAL: no deformity.  No tenderness in the left calf or left thigh.  He does have tenderness just inferior to the left patella anteriorly.  Pulses are strong.  Normal sensation and motor.  No warmth.  No erythema.  NEURO: alert, moves all extremities, follows commands      NIH:                                                             MEDICATIONS GIVEN IN ER  Medications   sodium chloride 0.9 % flush 10 mL (has no administration in time range)   pantoprazole (PROTONIX) EC tablet 40 mg (40 mg Oral Not Given 1/25/24 1829)   tamsulosin (FLOMAX) 24 hr capsule 0.4 mg (0.4 mg Oral Not Given 1/25/24 1829)   cetirizine (zyrTEC) tablet 10 mg (10 mg Oral Not Given 1/25/24 1828)   multivitamin (THERAGRAN) tablet 1 tablet (1 tablet Oral Not Given 1/25/24 1829)   nitroglycerin (NITROSTAT) SL tablet 0.4 mg (has no administration in time range)   LORazepam (ATIVAN) tablet 0.5 mg (has no administration in time range)   amLODIPine (NORVASC) tablet 5 mg (5 mg Oral Not Given 1/25/24 1828)   triamcinolone (KENALOG) 0.1 % cream 1 application  (has no administration in time range)   sertraline (ZOLOFT) tablet 25 mg (25 mg Oral Not Given 1/25/24 1829)   montelukast (SINGULAIR) tablet 10 mg (has no administration in time range)   benzonatate (TESSALON) capsule 200 mg (has no administration in time range)   ascorbic acid (VITAMIN C) tablet 500 mg (500 mg Oral Not Given 1/25/24 1828)   sodium chloride 0.9 % flush 10 mL (has no administration in time range)   sodium chloride 0.9 % flush 10 mL (has no administration in time range)   sodium chloride 0.9 % infusion 40 mL (has no administration in time range)   sennosides-docusate (PERICOLACE) 8.6-50 MG per tablet 2  tablet (has no administration in time range)     And   polyethylene glycol (MIRALAX) packet 17 g (has no administration in time range)     And   bisacodyl (DULCOLAX) EC tablet 5 mg (has no administration in time range)     And   bisacodyl (DULCOLAX) suppository 10 mg (has no administration in time range)   sodium chloride 0.9 % infusion (125 mL/hr Intravenous New Bag 1/25/24 1807)   Potassium Replacement - Follow Nurse / BPA Driven Protocol (has no administration in time range)   Magnesium Standard Dose Replacement - Follow Nurse / BPA Driven Protocol (has no administration in time range)   Phosphorus Replacement - Follow Nurse / BPA Driven Protocol (has no administration in time range)   Calcium Replacement - Follow Nurse / BPA Driven Protocol (has no administration in time range)   acetaminophen (TYLENOL) tablet 650 mg (has no administration in time range)     Or   acetaminophen (TYLENOL) 160 MG/5ML oral solution 650 mg (has no administration in time range)     Or   acetaminophen (TYLENOL) suppository 650 mg (has no administration in time range)   HYDROcodone-acetaminophen (NORCO) 5-325 MG per tablet 1 tablet (has no administration in time range)   HYDROmorphone (DILAUDID) injection 0.5 mg (0.5 mg Intravenous Given 1/25/24 1756)     And   naloxone (NARCAN) injection 0.4 mg (has no administration in time range)   ondansetron (ZOFRAN) injection 4 mg (4 mg Intravenous Given 1/25/24 1810)   sodium chloride 0.9 % bolus 500 mL (0 mL Intravenous Stopped 1/25/24 1452)   ketorolac (TORADOL) injection 15 mg (15 mg Intravenous Given 1/25/24 1325)   morphine injection 4 mg (4 mg Intravenous Given 1/25/24 1451)   ondansetron (ZOFRAN) injection 4 mg (4 mg Intravenous Given 1/25/24 1502)         ORDERS PLACED DURING THIS VISIT:  Orders Placed This Encounter   Procedures    XR Knee 1 or 2 View Left    Comprehensive Metabolic Panel    Reticulocytes    CBC Auto Differential    Uric Acid    Comprehensive Metabolic Panel    CBC Auto  Differential    Lactic Acid, Plasma    Procalcitonin    Diet: Cardiac Diets; Healthy Heart (2-3 Na+); Texture: Regular Texture (IDDSI 7); Fluid Consistency: Thin (IDDSI 0)    Vital Signs    Intake & Output    Weigh Patient    Oral Care    Saline Lock & Maintain IV Access    Place Sequential Compression Device    Maintain Sequential Compression Device    Pulse Oximetry, Continuous    Code Status and Medical Interventions:    ANUEL (on-call MD unless specified) Details    Insert Peripheral IV    Insert Peripheral IV    Inpatient Admission    CBC & Differential         PROCEDURES  Procedures            PROGRESS, DATA ANALYSIS, CONSULTS, AND MEDICAL DECISION MAKING  All labs have been independently interpreted by me.  All radiology studies have been reviewed by me. All EKG's have been independently viewed and interpreted by me.  Discussion below represents my analysis of pertinent findings related to patient's condition, differential diagnosis, treatment plan and final disposition.    Differential diagnosis includes but is not limited to sickle cell flare, gout, inflammatory arthritis, septic arthritis, fracture.    Clinical Scores:                  ED Course as of 01/25/24 1841   Thu Jan 25, 2024   1252 My independent interpretation of the x-ray left knee is no fracture.   [DC]   1259 XR Knee 1 or 2 View Left  My independent interpretation of the left knee x-ray is no gross fracture [TR]   1451 Patient presentation and evaluation consistent with uncomplicated sickle cell disease exacerbation with pain in the left knee.  The symptoms are consistent with prior sickle cell disease crises.  He continues with pain despite the use of IV NSAIDs, fluids, IV narcotics.  He will require admission to the hospital for symptom control and to consider further evaluation with orthopedics. [DC]   1454 Reticulocyte %(!): 4.20 [DC]   1455 WBC(!): 14.25 [DC]   1521 I discussed the case with MD Shamir with [facility] at this time regarding the  patient.  I discussed work-up, results, concerns.  I discussed the consulting provider's desire for med surg admit.    [DC]      ED Course User Index  [DC] Kade Short PA  [TR] Wallace Nickerson MD       MDM: With the patient's history of sickle cell disease plan to obtain chemistries and blood counts as well as reticulocytes.  We will order pain and IV fluids.  We will obtain imaging of the left knee.  I will also obtain uric acid to help rule out gout.  If his pain can be controlled with fluids and pain medicine and his imaging is negative and his labs show normal values and he may be able to be discharged home.      COMPLEXITY OF CARE  The patient requires admission.    Please note that portions of this document were completed with a voice recognition program.    Note Disclaimer: At [facility], we believe that sharing information builds trust and better relationships. You are receiving this note because you recently visited [facility]. It is possible you will see health information before a provider has talked with you about it. This kind of information can be easy to misunderstand. To help you fully understand what it means for your health, we urge you to discuss this note with your provider.         Wallace Nickerson MD  01/25/24 4455

## 2024-01-25 NOTE — ED NOTES
"Nursing report ED to floor  Henrry Glover  63 y.o.  male    HPI :   Chief Complaint   Patient presents with    Knee Pain       Admitting doctor:   India Barksdale MD    Admitting diagnosis:   The primary encounter diagnosis was Sickle-cell disease with pain. Diagnoses of Acute pain of left knee and Leukocytosis, unspecified type were also pertinent to this visit.    Code status:   Current Code Status       Date Active Code Status Order ID Comments User Context       Not on file            Allergies:   Levofloxacin, Penicillins, Oxycodone-acetaminophen, and Sulfamethoxazole-trimethoprim    Isolation:   No active isolations    Intake and Output  No intake or output data in the 24 hours ending 01/25/24 1553    Weight:       01/25/24  1145   Weight: 74.8 kg (165 lb)       Most recent vitals:   Vitals:    01/25/24 1140 01/25/24 1145 01/25/24 1401   BP:  142/72 140/83   Pulse: 65  73   Resp: 16     Temp: 98.3 °F (36.8 °C)     TempSrc: Tympanic     SpO2: 96%  96%   Weight:  74.8 kg (165 lb)    Height:  175.3 cm (69\")        Active LDAs/IV Access:   Lines, Drains & Airways       Active LDAs       Name Placement date Placement time Site Days    Peripheral IV 01/25/24 1323 Anterior;Distal;Right Forearm 01/25/24  1323  Forearm  less than 1                    Labs (abnormal labs have a star):   Labs Reviewed   COMPREHENSIVE METABOLIC PANEL - Abnormal; Notable for the following components:       Result Value    Creatinine 0.75 (*)     All other components within normal limits    Narrative:     GFR Normal >60  Chronic Kidney Disease <60  Kidney Failure <15     RETICULOCYTES - Abnormal; Notable for the following components:    Reticulocyte % 4.20 (*)     Reticulocyte Absolute 0.1483 (*)     All other components within normal limits   CBC WITH AUTO DIFFERENTIAL - Abnormal; Notable for the following components:    WBC 14.25 (*)     RBC 3.53 (*)     Hemoglobin 10.9 (*)     Hematocrit 33.6 (*)     Neutrophils, Absolute 9.80 (*)     " Monocytes, Absolute 1.13 (*)     All other components within normal limits   URIC ACID - Normal   CBC AND DIFFERENTIAL    Narrative:     The following orders were created for panel order CBC & Differential.  Procedure                               Abnormality         Status                     ---------                               -----------         ------                     CBC Auto Differential[[id number]]        Abnormal            Final result                 Please view results for these tests on the individual orders.       EKG:   No orders to display       Meds given in ED:   Medications   sodium chloride 0.9 % flush 10 mL (has no administration in time range)   sodium chloride 0.9 % bolus 500 mL (0 mL Intravenous Stopped 24 1452)   ketorolac (TORADOL) injection 15 mg (15 mg Intravenous Given 24 1325)   morphine injection 4 mg (4 mg Intravenous Given 24 1451)   ondansetron (ZOFRAN) injection 4 mg (4 mg Intravenous Given 24 1502)       Imaging results:  XR Knee 1 or 2 View Left    Result Date: 2024   As described.    This report was finalized on 2024 12:41 PM by Dr. Kodi Gonsalez M.D on Workstation: [id number]       Ambulatory status:   - assist     Social issues:   Social History     Socioeconomic History    Marital status:    Tobacco Use    Smoking status: Former     Packs/day: 0.25     Years: 10.00     Additional pack years: 0.00     Total pack years: 2.50     Types: Cigarettes     Quit date: 1981     Years since quittin.6    Smokeless tobacco: Never   Vaping Use    Vaping Use: Never used   Substance and Sexual Activity    Alcohol use: No    Drug use: No    Sexual activity: Defer       NIH Stroke Scale:       Thalia Camarena RN  24 15:53 EST

## 2024-01-25 NOTE — H&P
Internal medicine history and physical  INTERNAL MEDICINE   [facility]       Patient Identification:  Name: Henrry Glover  Age: 63 y.o.  Sex: male  :  1960  MRN: [id number]                   Primary Care Physician: Henny Patrick MD                               Date of admission:2024    Chief Complaint:  Pain and discomfort in the left knee since this morning.    History of Present Illness:   Patient is a 63-year-old male with history of sickle cell anemia for which she follows at [facility], history of hepatitis C for which he has had treatment, history of sleep apnea and benign prostatic hyperplasia as well as arthritis was in his usual state of his health and recalls his last sickle cell pain crises very very long time ago.  He was fine when he went to bed last night woke up this morning with pain and discomfort in his left knee which was progressively getting worse reminding him of the similar episode he had a long time ago due to sickle cell crisis.  Patient came to the emergency room for evaluation and is being admitted for pain control and further management of his left knee pain.  Patient does recall having meniscal surgery 10 years ago to repair a torn meniscus in his left knee.  Patient denies any worsening swelling of the knee any fever or chills or fatigue and weakness in the last few weeks prior to the onset of knee pain.  He states that at present his appetite is good and he denies any night sweats.  He can move his knee and is not standing on it and major pain is mainly in the bone above and below the knee joint.  Plain x-ray in the emergency room did not show any acute fracture erosion or dislocation and minimal effusion in the knee joint noted.  Moderate tibiofemoral joint space narrowing and degenerative changes and adjacent arterial calcifications noted.  According to patient he has been active and ambulating and denies any recent immobility  situations.      Past Medical History:  Past Medical History:   Diagnosis Date    Acid reflux     Allergic     Anxiety     Arthritis     Benign essential HTN 04/21/2017    BPH (benign prostatic hyperplasia)     Depression     DVT (deep venous thrombosis)     Eczema     Heart murmur     Hemorrhoid     Hepatitis C     history cleared    History of pneumonia     History of transfusion     Sickle cell anemia     Sinus problem     Sleep apnea     cpap    Tinnitus      Past Surgical History:  Past Surgical History:   Procedure Laterality Date    CHOLECYSTECTOMY      COLONOSCOPY  2012    ENDOSCOPY  02/22/2005    HEMORRHOIDECTOMY N/A 6/23/2016    Procedure: HEMORRHOIDECTOMY;  Surgeon: Ct Recio MD;  Location: [facility];  Service:     LIPOMA EXCISION      LYMPHADENECTOMY      WHIPPLE PROCEDURE  07/26/2019    UL    WRIST SURGERY Right     fractured and had plate put in      Home Meds:  Medications Prior to Admission   Medication Sig Dispense Refill Last Dose    amLODIPine (NORVASC) 5 MG tablet TAKE 1 TABLET BY MOUTH DAILY 90 tablet 1     Ascorbic Acid (VITAMIN C PO) Take 1 dose by mouth Daily.       azithromycin (Zithromax Z-Alberto) 250 MG tablet Take 2 tablets by mouth on day 1, then 1 tablet daily on days 2-5 (Patient taking differently: Take  by mouth See Admin Instructions. Take 2 tablets by mouth on day 1, then 1 tablet daily on days 2-5) 6 tablet 0     benzonatate (TESSALON) 200 MG capsule Take 1 capsule by mouth 3 (Three) Times a Day As Needed for Cough. 21 capsule 0     cetirizine (zyrTEC) 10 MG tablet Take 1 tablet by mouth Daily.       LORazepam (ATIVAN) 0.5 MG tablet TAKE 1 TABLET BY MOUTH EVERY 8 HOURS AS NEEDED FOR ANXIETY (Patient taking differently: Take 1 tablet by mouth Every 8 (Eight) Hours As Needed.) 25 tablet 0     montelukast (SINGULAIR) 10 MG tablet TAKE 1 TABLET BY MOUTH AT BEDTIME (Patient taking differently: Take 1 tablet by mouth Every Night.) 90 tablet 2     Multiple Vitamins-Minerals  (MULTIVITAMIN PO) Take 1 tablet by mouth Daily.       pantoprazole (PROTONIX) 40 MG EC tablet Take 1 tablet by mouth Daily.       sertraline (ZOLOFT) 25 MG tablet TAKE 1 TABLET BY MOUTH DAILY 30 tablet 1     tamsulosin (FLOMAX) 0.4 MG capsule 24 hr capsule Take 1 capsule by mouth Daily.  4     triamcinolone (KENALOG) 0.025 % cream APPLY TOPICALLY TO THE AFFECTED AREA THREE TIMES DAILY (Patient taking differently: Apply 1 application  topically to the appropriate area as directed 3 (Three) Times a Day.) 240 g 0     famciclovir (FAMVIR) 500 MG tablet TAKE 2 TABLETS BY MOUTH TWICE DAILY AS NEEDED FOR ONSET OF SYMPTOMS 24 tablet 0     loperamide (IMODIUM) 2 MG capsule Take 1 capsule by mouth 4 (Four) Times a Day As Needed for Diarrhea. 6 capsule 1     nitroglycerin (NITROSTAT) 0.4 MG SL tablet Place 1 tablet under the tongue Every 5 (Five) Minutes As Needed for Chest Pain. Take no more than 3 doses in 15 minutes. 30 tablet 0     nystatin (MYCOSTATIN) 100,000 unit/mL suspension SWISH AND SPIT 5 ML BY MOUTH FOUR TIMES DAILY 60 mL 1     ondansetron (Zofran) 4 MG tablet Take 1 tablet by mouth Every 8 (Eight) Hours As Needed for Nausea or Vomiting. 30 tablet 0     ondansetron ODT (ZOFRAN-ODT) 4 MG disintegrating tablet Place 1 tablet on the tongue Every 4 (Four) Hours. 12 tablet 0      Current Meds:     Current Facility-Administered Medications:     acetaminophen (TYLENOL) tablet 650 mg, 650 mg, Oral, Q4H PRN **OR** acetaminophen (TYLENOL) 160 MG/5ML oral solution 650 mg, 650 mg, Oral, Q4H PRN **OR** acetaminophen (TYLENOL) suppository 650 mg, 650 mg, Rectal, Q4H PRN, India Barksdale MD    amLODIPine (NORVASC) tablet 5 mg, 5 mg, Oral, Daily, ShamirnIdia castaneda MD    ascorbic acid (VITAMIN C) tablet 500 mg, 500 mg, Oral, Daily, ShamirIndia castaneda MD    benzonatate (TESSALON) capsule 200 mg, 200 mg, Oral, TID PRN, India Barksdale MD    sennosides-docusate (PERICOLACE) 8.6-50 MG per tablet 2 tablet, 2 tablet, Oral, BID **AND** polyethylene  glycol (MIRALAX) packet 17 g, 17 g, Oral, Daily PRN **AND** bisacodyl (DULCOLAX) EC tablet 5 mg, 5 mg, Oral, Daily PRN **AND** bisacodyl (DULCOLAX) suppository 10 mg, 10 mg, Rectal, Daily PRN, India Barksdale MD    Calcium Replacement - Follow Nurse / BPA Driven Protocol, , Does not apply, PRN, India Barksdale MD    cetirizine (zyrTEC) tablet 10 mg, 10 mg, Oral, Daily, India Barksdale MD    HYDROcodone-acetaminophen (NORCO) 5-325 MG per tablet 1 tablet, 1 tablet, Oral, Q4H PRN, India Barksdale MD    HYDROmorphone (DILAUDID) injection 0.5 mg, 0.5 mg, Intravenous, Q2H PRN **AND** naloxone (NARCAN) injection 0.4 mg, 0.4 mg, Intravenous, Q5 Min PRN, India Barksdale MD    LORazepam (ATIVAN) tablet 0.5 mg, 0.5 mg, Oral, Q8H PRN, India Barksdale MD    Magnesium Standard Dose Replacement - Follow Nurse / BPA Driven Protocol, , Does not apply, PRNShamir Jawed, MD    montelukast (SINGULAIR) tablet 10 mg, 10 mg, Oral, Nightly, India Barksdale MD    multivitamin (THERAGRAN) tablet 1 tablet, 1 tablet, Oral, Daily, India Barksdale MD    nitroglycerin (NITROSTAT) SL tablet 0.4 mg, 0.4 mg, Sublingual, Q5 Min PRN, India Barksdale MD    ondansetron (ZOFRAN) injection 4 mg, 4 mg, Intravenous, Q6H PRN, India Barksdale MD    pantoprazole (PROTONIX) EC tablet 40 mg, 40 mg, Oral, Daily, India Barksdale MD    Phosphorus Replacement - Follow Nurse / BPA Driven Protocol, , Does not apply, PRNShamir Jawed, MD    Potassium Replacement - Follow Nurse / BPA Driven Protocol, , Does not apply, PRNShamir Jawed, MD    sertraline (ZOLOFT) tablet 25 mg, 25 mg, Oral, Daily, Shamir, Jawed, MD    [COMPLETED] Insert Peripheral IV, , , Once **AND** sodium chloride 0.9 % flush 10 mL, 10 mL, Intravenous, PRN, India Barksdale MD    sodium chloride 0.9 % flush 10 mL, 10 mL, Intravenous, Q12H, India Barksdale MD    sodium chloride 0.9 % flush 10 mL, 10 mL, Intravenous, PRN, India Barksdale MD    sodium chloride 0.9 % infusion 40 mL, 40 mL, Intravenous, PRN, India Barksdale MD    sodium  "chloride 0.9 % infusion, 125 mL/hr, Intravenous, Continuous, India Barksdale MD    tamsulosin (FLOMAX) 24 hr capsule 0.4 mg, 0.4 mg, Oral, Daily, India Barksdale MD    triamcinolone (KENALOG) 0.025 % cream 1 application , 1 application , Topical, TID, India Barksdale MD  Allergies:  Allergies   Allergen Reactions    Levofloxacin Swelling    Penicillins Swelling    Oxycodone-Acetaminophen GI Intolerance    Sulfamethoxazole-Trimethoprim Rash     Social History:   Social History     Tobacco Use    Smoking status: Former     Packs/day: 0.25     Years: 10.00     Additional pack years: 0.00     Total pack years: 2.50     Types: Cigarettes     Quit date: 1981     Years since quittin.6    Smokeless tobacco: Never   Substance Use Topics    Alcohol use: No      Family History:  Family History   Problem Relation Age of Onset    Heart attack Maternal Uncle     Arthritis Mother           Review of Systems  See history of present illness and past medical history.  Patient denies headache, dizziness, syncope, falls, trauma, change in vision, change in hearing, change in taste, changes in weight, changes in appetite, focal weakness, numbness, or paresthesia.  Patient denies chest pain, palpitations, dyspnea, orthopnea, PND, cough, sinus pressure, rhinorrhea, epistaxis, hemoptysis, nausea, vomiting,hematemesis, diarrhea, constipation or hematchezia.  Denies cold or heat intolerance, polydipsia, polyuria, polyphagia. Denies hematuria, pyuria, dysuria, hesitancy, frequency or urgency. Denies consumption of raw and under cooked meats foods or change in water source.  Denies fever, chills, sweats, night sweats.  Denies missing any routine medications. Remainder of ROS is negative.      Vitals:   /64   Pulse 68   Temp 98.3 °F (36.8 °C) (Tympanic)   Resp 16   Ht 175.3 cm (69\")   Wt 74.8 kg (165 lb)   SpO2 97%   BMI 24.37 kg/m²   I/O: No intake or output data in the 24 hours ending 24 8892  Exam:  Patient is examined " using the personal protective equipment as per guidelines from infection control for this particular patient as enacted.  Hand washing was performed before and after patient interaction.  General Appearance:    Alert, cooperative, no distress, appears stated age   Head:    Normocephalic, without obvious abnormality, atraumatic   Eyes:    PERRL, conjunctiva/corneas pale with no icterus, EOM's intact, both eyes   Ears:    Normal external ear canals, both ears   Nose:   Nares normal, septum midline, mucosa normal, no drainage    or sinus tenderness   Throat:   Lips, tongue, gums normal; oral mucosa pink and moist   Neck:   Supple, symmetrical, trachea midline, no adenopathy;     thyroid:  no enlargement/tenderness/nodules; no carotid    bruit or JVD   Back:     Symmetric, no curvature, ROM normal, no CVA tenderness   Lungs:     Clear to auscultation bilaterally, respirations unlabored   Chest Wall:    No tenderness or deformity    Heart:  S1-S2 regular no murmur heard   Abdomen:     Soft, non-tender, bowel sounds active all four quadrants,     no masses, no hepatomegaly, no splenomegaly   Extremities: No significant warmth or effusion in the left knee joint noted tenderness in the distal femur and proximal tibial area with minimal joint line tenderness noted.   Pulses:   Pulses palpable in all extremities; symmetric all extremities   Skin:   Skin color normal, Skin is warm and dry,  no rashes or palpable lesions   Neurologic:   CNII-XII intact, motor strength grossly intact, sensation grossly intact to light touch, no focal deficits noted       Data Review:      I reviewed the patient's new clinical results.  Results from last 7 days   Lab Units 01/25/24  1327   WBC 10*3/mm3 14.25*   HEMOGLOBIN g/dL 10.9*   PLATELETS 10*3/mm3 257     Results from last 7 days   Lab Units 01/25/24  1327   SODIUM mmol/L 143   POTASSIUM mmol/L 4.4   CHLORIDE mmol/L 107   CO2 mmol/L 25.9   BUN mg/dL 8   CREATININE mg/dL 0.75*   CALCIUM  mg/dL 9.5   GLUCOSE mg/dL 90     XR Knee 1 or 2 View Left    Result Date: 1/25/2024   As described.    This report was finalized on 1/25/2024 12:41 PM by Dr. Kodi Gonsalez M.D on Workstation: [id number]     Microbiology Results (last 10 days)       ** No results found for the last 240 hours. 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]  Yes     This is followed at        Atopic dermatitis [L20.9]  Yes    Hepatitis C virus infection [B19.20]  Yes     tx successful with harvoni in 2017      History of pulmonary embolus (PE) [Z86.711]  Yes     in 2002 after gallbladder surgery         Medical decision making/care plan: See admitting orders  Acute painful left knee in the setting of prior knee surgery for meniscal tear in the setting of underlying sickle cell disease with very rare pain crises last crisis about 15 years ago with no systemic signs and symptoms of fever or chills fatigue weakness or change in appetite or night sweats and unremarkable plain x-ray without any evidence of fracture or dislocation or cortical destruction-this could represent acute sickle cell pain crises but given his prior history of meniscal surgery and localization of crisis to this area needs to be monitored for resolution of pain and discomfort with typical management of painful sickle crisis consisting of IV fluids pain medication and rest.  If this does not get better then  orthopedic evaluation and further workup may be needed.  Because of his history of PE after gallbladder surgery in 2002 and DVT will check venous Doppler of the lower extremities.  Hypertension-continue antihypertensive regimen and avoid hypotensive episode.  History of pulmonary embolism after gallbladder surgery with DVT-plan is to monitor and get venous Doppler of the lower extremities.  India Barksdale MD   1/25/2024  17:22 EST    Parts of this note may be an electronic transcription/translation of spoken language to printed text using the Dragon dictation system.

## 2024-01-25 NOTE — ED PROVIDER NOTES
" EMERGENCY DEPARTMENT ENCOUNTER      Room Number:  08/08  PCP: Henny Patrick MD  Independent Historians: Patient  Patient Care Team:  Henny Patrick MD as PCP - Ludivina Palencia MA (Inactive) as Medical Assistant  Prasanna Reagan MD as Consulting Physician (Orthopedic Surgery)       HPI:  Chief Complaint: Knee pain    A complete HPI/ROS/PMH/PSH/SH/FH are unobtainable due to: None    Chronic or social conditions impacting patient care (Social Determinants of Health): None      Context: Henrry Glover is a 63 y.o. male with a PMH significant for sickle cell disease, anxiety who presents to the ED c/o acute left knee pain since waking up this morning.  He felt some mild discomfort initially but his symptoms progressed through the morning.  He has some concern for sickle cell flareup.  States that he has not had an exacerbation of his sickle cell in \"many years\".  Also reports a remote history of meniscus repair on the left knee.  He has not noted any associated swelling or warmth.  No known injuries.  Has not attempted to alleviate symptoms prior to arrival with medications.      Upon review of prior external notes (non-ED) -and- Review of prior external test results outside of this encounter it appears the patient was evaluated in the office with sleep medicine for obstructive sleep apnea on January 12, 2023.  The patient had a normal glucose and urinalysis on 9/13/2023.      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    Benign essential HTN 04/21/2017    History of pulmonary embolism 01/01/2002    Retained orthopedic hardware 01/09/2020    BPH (benign prostatic hyperplasia) 04/05/2019    Hepatic fibrosis 01/09/2020    " Hepatitis C virus infection 2002    Primary pancreatic neuroendocrine tumor 2020    History of sickle cell anemia 2021    Hemangioma of liver 2020    LAFB (left anterior fascicular block) 2023    Norovirus 2023    History of pulmonary embolus (PE) 2002     Resolved Ambulatory Problems     Diagnosis Date Noted    Lipoma 2016    Lymphadenopathy 2016    Presence of other specified devices 2020    COVID-19 virus detected 2022     Past Medical History:   Diagnosis Date    Acid reflux     Allergic     Arthritis     Depression     DVT (deep venous thrombosis)     Eczema     Heart murmur     Hemorrhoid     Hepatitis C     History of pneumonia     History of transfusion     Sickle cell anemia     Sinus problem     Sleep apnea     Tinnitus          PAST SURGICAL HISTORY  Past Surgical History:   Procedure Laterality Date    CHOLECYSTECTOMY      COLONOSCOPY      ENDOSCOPY  2005    HEMORRHOIDECTOMY N/A 2016    Procedure: HEMORRHOIDECTOMY;  Surgeon: Ct Recio MD;  Location: [facility];  Service:     LIPOMA EXCISION      LYMPHADENECTOMY      WHIPPLE PROCEDURE  2019    UL    WRIST SURGERY Right     fractured and had plate put in         FAMILY HISTORY  Family History   Problem Relation Age of Onset    Heart attack Maternal Uncle     Arthritis Mother          SOCIAL HISTORY  Social History     Socioeconomic History    Marital status:    Tobacco Use    Smoking status: Former     Packs/day: 0.25     Years: 10.00     Additional pack years: 0.00     Total pack years: 2.50     Types: Cigarettes     Quit date: 1981     Years since quittin.6    Smokeless tobacco: Never   Vaping Use    Vaping Use: Never used   Substance and Sexual Activity    Alcohol use: No    Drug use: No    Sexual activity: Defer         ALLERGIES  Levofloxacin, Penicillins, Oxycodone-acetaminophen, and Sulfamethoxazole-trimethoprim      REVIEW OF  SYSTEMS  Included in HPI  All systems reviewed and negative except for those discussed in HPI.      PHYSICAL EXAM    I have reviewed the triage vital signs and nursing notes.    ED Triage Vitals   Temp Heart Rate Resp BP SpO2   01/25/24 1140 01/25/24 1140 01/25/24 1140 01/25/24 1145 01/25/24 1140   98.3 °F (36.8 °C) 65 16 142/72 96 %      Temp src Heart Rate Source Patient Position BP Location FiO2 (%)   01/25/24 1140 01/25/24 1140 -- -- --   Tympanic Monitor          Physical Exam  Constitutional:       General: He is not in acute distress.     Appearance: He is well-developed.   HENT:      Head: Normocephalic and atraumatic.   Eyes:      General: No scleral icterus.     Conjunctiva/sclera: Conjunctivae normal.   Neck:      Trachea: No tracheal deviation.   Cardiovascular:      Rate and Rhythm: Normal rate and regular rhythm.   Pulmonary:      Effort: Pulmonary effort is normal.      Breath sounds: Normal breath sounds.   Abdominal:      Palpations: Abdomen is soft.      Tenderness: There is no abdominal tenderness. There is no guarding.   Musculoskeletal:         General: No deformity.      Cervical back: Normal range of motion.      Left knee: No swelling, deformity, erythema or ecchymosis. Normal range of motion. Tenderness present.   Lymphadenopathy:      Cervical: No cervical adenopathy.   Skin:     General: Skin is warm and dry.   Neurological:      Mental Status: He is alert and oriented to person, place, and time.   Psychiatric:         Behavior: Behavior normal.         Vital signs and nursing notes reviewed.      PPE: I wore a surgical mask throughout my encounters with the pt. I performed hand hygiene on entry into the pt room and upon exit.      LAB RESULTS  Recent Results (from the past 24 hour(s))   Comprehensive Metabolic Panel    Collection Time: 01/25/24  1:27 PM    Specimen: Arm, Right; Blood   Result Value Ref Range    Glucose 90 65 - 99 mg/dL    BUN 8 8 - 23 mg/dL    Creatinine 0.75 (L) 0.76 -  1.27 mg/dL    Sodium 143 136 - 145 mmol/L    Potassium 4.4 3.5 - 5.2 mmol/L    Chloride 107 98 - 107 mmol/L    CO2 25.9 22.0 - 29.0 mmol/L    Calcium 9.5 8.6 - 10.5 mg/dL    Total Protein 7.5 6.0 - 8.5 g/dL    Albumin 4.3 3.5 - 5.2 g/dL    ALT (SGPT) 24 1 - 41 U/L    AST (SGOT) 25 1 - 40 U/L    Alkaline Phosphatase 101 39 - 117 U/L    Total Bilirubin 0.8 0.0 - 1.2 mg/dL    Globulin 3.2 gm/dL    A/G Ratio 1.3 g/dL    BUN/Creatinine Ratio 10.7 7.0 - 25.0    Anion Gap 10.1 5.0 - 15.0 mmol/L    eGFR 100.1 >60.0 mL/min/1.73   Reticulocytes    Collection Time: 01/25/24  1:27 PM    Specimen: Arm, Right; Blood   Result Value Ref Range    Reticulocyte % 4.20 (H) 0.70 - 1.90 %    Reticulocyte Absolute 0.1483 (H) 0.0200 - 0.1300 10*6/mm3   CBC Auto Differential    Collection Time: 01/25/24  1:27 PM    Specimen: Arm, Right; Blood   Result Value Ref Range    WBC 14.25 (H) 3.40 - 10.80 10*3/mm3    RBC 3.53 (L) 4.14 - 5.80 10*6/mm3    Hemoglobin 10.9 (L) 13.0 - 17.7 g/dL    Hematocrit 33.6 (L) 37.5 - 51.0 %    MCV 95.2 79.0 - 97.0 fL    MCH 30.9 26.6 - 33.0 pg    MCHC 32.4 31.5 - 35.7 g/dL    RDW 14.2 12.3 - 15.4 %    RDW-SD 48.9 37.0 - 54.0 fl    MPV 11.9 6.0 - 12.0 fL    Platelets 257 140 - 450 10*3/mm3    Neutrophil % 68.8 42.7 - 76.0 %    Lymphocyte % 19.9 19.6 - 45.3 %    Monocyte % 7.9 5.0 - 12.0 %    Eosinophil % 1.4 0.3 - 6.2 %    Basophil % 0.9 0.0 - 1.5 %    Neutrophils, Absolute 9.80 (H) 1.70 - 7.00 10*3/mm3    Lymphocytes, Absolute 2.83 0.70 - 3.10 10*3/mm3    Monocytes, Absolute 1.13 (H) 0.10 - 0.90 10*3/mm3    Eosinophils, Absolute 0.20 0.00 - 0.40 10*3/mm3    Basophils, Absolute 0.13 0.00 - 0.20 10*3/mm3   Uric Acid    Collection Time: 01/25/24  1:27 PM    Specimen: Arm, Right; Blood   Result Value Ref Range    Uric Acid 6.2 3.4 - 7.0 mg/dL         RADIOLOGY  XR Knee 1 or 2 View Left    Result Date: 1/25/2024  XR KNEE 1 OR 2 VW LEFT-  INDICATIONS: Pain, no injury  TECHNIQUE: 2 VIEWS OF THE left knee  COMPARISON:  None available  FINDINGS:  No acute fracture, erosion, or dislocation is identified. Minimal knee effusion is noted. Moderate tibiofemoral joint space narrowing is seen. Mild degenerative spurring is noted. Arterial calcifications are present. Follow-up/further evaluation can be obtained as indications persist.       As described.    This report was finalized on 1/25/2024 12:41 PM by Dr. Kodi Gonsalez M.D on Workstation: [id number]         MEDICATIONS GIVEN IN ER  Medications   sodium chloride 0.9 % flush 10 mL (has no administration in time range)   sodium chloride 0.9 % bolus 500 mL (0 mL Intravenous Stopped 1/25/24 1452)   ketorolac (TORADOL) injection 15 mg (15 mg Intravenous Given 1/25/24 1325)   morphine injection 4 mg (4 mg Intravenous Given 1/25/24 1451)   ondansetron (ZOFRAN) injection 4 mg (4 mg Intravenous Given 1/25/24 1502)         ORDERS PLACED DURING THIS VISIT:  Orders Placed This Encounter   Procedures    XR Knee 1 or 2 View Left    Comprehensive Metabolic Panel    Reticulocytes    CBC Auto Differential    Uric Acid    [facility] (on-call MD unless specified) Details    Insert Peripheral IV    Inpatient Admission    CBC & Differential         OUTPATIENT MEDICATION MANAGEMENT:  Current Facility-Administered Medications Ordered in Epic   Medication Dose Route Frequency Provider Last Rate Last Admin    sodium chloride 0.9 % flush 10 mL  10 mL Intravenous PRN Kade Short PA         Current Outpatient Medications Ordered in Epic   Medication Sig Dispense Refill    amLODIPine (NORVASC) 5 MG tablet TAKE 1 TABLET BY MOUTH DAILY 90 tablet 1    Ascorbic Acid (VITAMIN C PO) Take 1 dose by mouth Daily.      azithromycin (Zithromax Z-Alberto) 250 MG tablet Take 2 tablets by mouth on day 1, then 1 tablet daily on days 2-5 (Patient taking differently: Take  by mouth See Admin Instructions. Take 2 tablets by mouth on day 1, then 1 tablet daily on days 2-5) 6 tablet 0    benzonatate (TESSALON) 200 MG capsule Take 1  capsule by mouth 3 (Three) Times a Day As Needed for Cough. 21 capsule 0    cetirizine (zyrTEC) 10 MG tablet Take 1 tablet by mouth Daily.      LORazepam (ATIVAN) 0.5 MG tablet TAKE 1 TABLET BY MOUTH EVERY 8 HOURS AS NEEDED FOR ANXIETY (Patient taking differently: Take 1 tablet by mouth Every 8 (Eight) Hours As Needed.) 25 tablet 0    montelukast (SINGULAIR) 10 MG tablet TAKE 1 TABLET BY MOUTH AT BEDTIME (Patient taking differently: Take 1 tablet by mouth Every Night.) 90 tablet 2    Multiple Vitamins-Minerals (MULTIVITAMIN PO) Take 1 tablet by mouth Daily.      pantoprazole (PROTONIX) 40 MG EC tablet Take 1 tablet by mouth Daily.      sertraline (ZOLOFT) 25 MG tablet TAKE 1 TABLET BY MOUTH DAILY 30 tablet 1    tamsulosin (FLOMAX) 0.4 MG capsule 24 hr capsule Take 1 capsule by mouth Daily.  4    triamcinolone (KENALOG) 0.025 % cream APPLY TOPICALLY TO THE AFFECTED AREA THREE TIMES DAILY (Patient taking differently: Apply 1 application  topically to the appropriate area as directed 3 (Three) Times a Day.) 240 g 0    famciclovir (FAMVIR) 500 MG tablet TAKE 2 TABLETS BY MOUTH TWICE DAILY AS NEEDED FOR ONSET OF SYMPTOMS 24 tablet 0    loperamide (IMODIUM) 2 MG capsule Take 1 capsule by mouth 4 (Four) Times a Day As Needed for Diarrhea. 6 capsule 1    nitroglycerin (NITROSTAT) 0.4 MG SL tablet Place 1 tablet under the tongue Every 5 (Five) Minutes As Needed for Chest Pain. Take no more than 3 doses in 15 minutes. 30 tablet 0    nystatin (MYCOSTATIN) 100,000 unit/mL suspension SWISH AND SPIT 5 ML BY MOUTH FOUR TIMES DAILY 60 mL 1    ondansetron (Zofran) 4 MG tablet Take 1 tablet by mouth Every 8 (Eight) Hours As Needed for Nausea or Vomiting. 30 tablet 0    ondansetron ODT (ZOFRAN-ODT) 4 MG disintegrating tablet Place 1 tablet on the tongue Every 4 (Four) Hours. 12 tablet 0             PROGRESS, DATA ANALYSIS, CONSULTS, AND MEDICAL DECISION MAKING  All labs have been independently interpreted by me.  All radiology studies  have been reviewed by me. All EKG's have been independently viewed and interpreted by me.  Discussion below represents my analysis of pertinent findings related to patient's condition, differential diagnosis, treatment plan and final disposition.      DIFFERENTIAL DIAGNOSIS INCLUDE BUT NOT LIMITED TO:     Sickle cell pain crisis, left knee effusion, left knee sprain    Clinical Scores: N/A      ED Course as of 01/25/24 1522   Thu Jan 25, 2024   1252 My independent interpretation of the x-ray left knee is no fracture.   [DC]   1259 XR Knee 1 or 2 View Left  My independent interpretation of the left knee x-ray is no gross fracture [TR]   1451 Patient presentation and evaluation consistent with uncomplicated sickle cell disease exacerbation with pain in the left knee.  The symptoms are consistent with prior sickle cell disease crises.  He continues with pain despite the use of IV NSAIDs, fluids, IV narcotics.  He will require admission to the hospital for symptom control and to consider further evaluation with orthopedics. [DC]   1454 Reticulocyte %(!): 4.20 [DC]   1455 WBC(!): 14.25 [DC]   1521 I discussed the case with MD Shamir with [facility] at this time regarding the patient.  I discussed work-up, results, concerns.  I discussed the consulting provider's desire for med surg admit.    [DC]      ED Course User Index  [DC] Kade Short PA  [TR] Wallace Nickerson MD           1522 I rechecked the patient.  I discussed the patient's labs, radiology findings (including all incidental findings), diagnosis, and plan for admission. The patient understands and agrees with the plan.      AS OF 15:22 EST VITALS:    BP - 140/83  HR - 73  TEMP - 98.3 °F (36.8 °C) (Tympanic)  O2 SATS - 96%    COMPLEXITY OF CARE  The patient requires admission.      DIAGNOSIS  Final diagnoses:   Sickle-cell disease with pain   Acute pain of left knee   Leukocytosis, unspecified type         DISPOSITION  ED Disposition       ED Disposition    Decision to Admit    Condition   --    Comment   Level of Care: Med/Surg [1]   Diagnosis: Sickle cell anemia [550122]   Admitting Physician: RICK SUBRAMANIAN [[id number]]   Attending Physician: RICK SUBRAMANIAN [[id number]]   Certification: I Certify That Inpatient Hospital Services Are Medically Necessary For Greater Than 2 Midnights                  Please note that portions of this document were completed with a voice recognition program.    Note Disclaimer: At [facility], we believe that sharing information builds trust and better relationships. You are receiving this note because you recently visited [facility]. It is possible you will see health information before a provider has talked with you about it. This kind of information can be easy to misunderstand. To help you fully understand what it means for your health, we urge you to discuss this note with your provider.         Kade Short PA  01/25/24 1523

## 2024-01-25 NOTE — PROGRESS NOTES
Clinical Pharmacy Services: Medication History    Henrry Glover is a 63 y.o. male presenting to [facility] for   Chief Complaint   Patient presents with    Knee Pain       He  has a past medical history of Acid reflux, Allergic, Anxiety, Arthritis, Benign essential HTN (04/21/2017), BPH (benign prostatic hyperplasia), Depression, DVT (deep venous thrombosis), Eczema, Heart murmur, Hemorrhoid, Hepatitis C, History of pneumonia, History of transfusion, Sickle cell anemia, Sinus problem, Sleep apnea, and Tinnitus.    Allergies as of 01/25/2024 - Reviewed 01/25/2024   Allergen Reaction Noted    Levofloxacin Swelling 03/22/2016    Penicillins Swelling 03/22/2016    Oxycodone-acetaminophen GI Intolerance 09/01/2023    Sulfamethoxazole-trimethoprim Rash 08/09/2019       Medication information was obtained from: Pharmacy    Pharmacy and Phone Number:   [id number] - [place], [place] - [address] AT [facility] [facility] - [phone]  - [phone] [facility] [facility] [address]  Phone: [phone] Fax: [phone]        Prior to Admission Medications       Prescriptions Last Dose Informant Patient Reported? Taking?    amLODIPine (NORVASC) 5 MG tablet  Pharmacy No Yes    TAKE 1 TABLET BY MOUTH DAILY    Ascorbic Acid (VITAMIN C PO)  Self Yes Yes    Take 1 dose by mouth Daily.    azithromycin (Zithromax Z-Alberto) 250 MG tablet  Pharmacy No Yes    Take 2 tablets by mouth on day 1, then 1 tablet daily on days 2-5    Patient taking differently:  Take  by mouth See Admin Instructions. Take 2 tablets by mouth on day 1, then 1 tablet daily on days 2-5    benzonatate (TESSALON) 200 MG capsule  Pharmacy No Yes    Take 1 capsule by mouth 3 (Three) Times a Day As Needed for Cough.    cetirizine (zyrTEC) 10 MG tablet  Self Yes Yes    Take 1 tablet by mouth Daily.    LORazepam (ATIVAN) 0.5 MG tablet  Pharmacy No Yes    TAKE 1 TABLET BY MOUTH EVERY 8 HOURS AS NEEDED FOR  ANXIETY    Patient taking differently:  Take 1 tablet by mouth Every 8 (Eight) Hours As Needed.    montelukast (SINGULAIR) 10 MG tablet  Pharmacy No Yes    TAKE 1 TABLET BY MOUTH AT BEDTIME    Patient taking differently:  Take 1 tablet by mouth Every Night.    Multiple Vitamins-Minerals (MULTIVITAMIN PO)  Self Yes Yes    Take 1 tablet by mouth Daily.    pantoprazole (PROTONIX) 40 MG EC tablet  Pharmacy Yes Yes    Take 1 tablet by mouth Daily.    sertraline (ZOLOFT) 25 MG tablet  Pharmacy No Yes    TAKE 1 TABLET BY MOUTH DAILY    tamsulosin (FLOMAX) 0.4 MG capsule 24 hr capsule  Pharmacy Yes Yes    Take 1 capsule by mouth Daily.    triamcinolone (KENALOG) 0.025 % cream  Pharmacy No Yes    APPLY TOPICALLY TO THE AFFECTED AREA THREE TIMES DAILY    Patient taking differently:  Apply 1 application  topically to the appropriate area as directed 3 (Three) Times a Day.    famciclovir (FAMVIR) 500 MG tablet   No No    TAKE 2 TABLETS BY MOUTH TWICE DAILY AS NEEDED FOR ONSET OF SYMPTOMS    loperamide (IMODIUM) 2 MG capsule   No No    Take 1 capsule by mouth 4 (Four) Times a Day As Needed for Diarrhea.    nitroglycerin (NITROSTAT) 0.4 MG SL tablet  Pharmacy No No    Place 1 tablet under the tongue Every 5 (Five) Minutes As Needed for Chest Pain. Take no more than 3 doses in 15 minutes.    nystatin (MYCOSTATIN) 100,000 unit/mL suspension   No No    SWISH AND SPIT 5 ML BY MOUTH FOUR TIMES DAILY    ondansetron (Zofran) 4 MG tablet   No No    Take 1 tablet by mouth Every 8 (Eight) Hours As Needed for Nausea or Vomiting.    ondansetron ODT (ZOFRAN-ODT) 4 MG disintegrating tablet   No No    Place 1 tablet on the tongue Every 4 (Four) Hours.              Medication notes:     This medication list is complete to the best of my knowledge as of 1/25/2024    Please call if questions.    Tony Clarke  Medication History Technician  [phone]    1/25/2024 15:20 EST

## 2024-01-26 ENCOUNTER — APPOINTMENT (OUTPATIENT)
Dept: CARDIOLOGY | Facility: HOSPITAL | Age: 64
DRG: 812 | End: 2024-01-26
Payer: MEDICARE

## 2024-01-26 LAB
ALBUMIN SERPL-MCNC: 3.4 G/DL (ref 3.5–5.2)
ALBUMIN/GLOB SERPL: 1.2 G/DL
ALP SERPL-CCNC: 82 U/L (ref 39–117)
ALT SERPL W P-5'-P-CCNC: 19 U/L (ref 1–41)
ANION GAP SERPL CALCULATED.3IONS-SCNC: 6.4 MMOL/L (ref 5–15)
AST SERPL-CCNC: 21 U/L (ref 1–40)
BASOPHILS # BLD AUTO: 0.08 10*3/MM3 (ref 0–0.2)
BASOPHILS NFR BLD AUTO: 0.7 % (ref 0–1.5)
BH CV LOWER VASCULAR LEFT COMMON FEMORAL AUGMENT: NORMAL
BH CV LOWER VASCULAR LEFT COMMON FEMORAL COMPETENT: NORMAL
BH CV LOWER VASCULAR LEFT COMMON FEMORAL COMPRESS: NORMAL
BH CV LOWER VASCULAR LEFT COMMON FEMORAL PHASIC: NORMAL
BH CV LOWER VASCULAR LEFT COMMON FEMORAL SPONT: NORMAL
BH CV LOWER VASCULAR LEFT DISTAL FEMORAL COMPRESS: NORMAL
BH CV LOWER VASCULAR LEFT GASTRONEMIUS COMPRESS: NORMAL
BH CV LOWER VASCULAR LEFT GREATER SAPH AK COMPRESS: NORMAL
BH CV LOWER VASCULAR LEFT GREATER SAPH BK COMPRESS: NORMAL
BH CV LOWER VASCULAR LEFT LESSER SAPH COMPRESS: NORMAL
BH CV LOWER VASCULAR LEFT MID FEMORAL AUGMENT: NORMAL
BH CV LOWER VASCULAR LEFT MID FEMORAL COMPETENT: NORMAL
BH CV LOWER VASCULAR LEFT MID FEMORAL COMPRESS: NORMAL
BH CV LOWER VASCULAR LEFT MID FEMORAL PHASIC: NORMAL
BH CV LOWER VASCULAR LEFT MID FEMORAL SPONT: NORMAL
BH CV LOWER VASCULAR LEFT PERONEAL COMPRESS: NORMAL
BH CV LOWER VASCULAR LEFT POPLITEAL AUGMENT: NORMAL
BH CV LOWER VASCULAR LEFT POPLITEAL COMPETENT: NORMAL
BH CV LOWER VASCULAR LEFT POPLITEAL COMPRESS: NORMAL
BH CV LOWER VASCULAR LEFT POPLITEAL PHASIC: NORMAL
BH CV LOWER VASCULAR LEFT POPLITEAL SPONT: NORMAL
BH CV LOWER VASCULAR LEFT POSTERIOR TIBIAL COMPRESS: NORMAL
BH CV LOWER VASCULAR LEFT PROFUNDA FEMORAL COMPRESS: NORMAL
BH CV LOWER VASCULAR LEFT PROXIMAL FEMORAL COMPRESS: NORMAL
BH CV LOWER VASCULAR LEFT SAPHENOFEMORAL JUNCTION COMPRESS: NORMAL
BH CV LOWER VASCULAR RIGHT COMMON FEMORAL AUGMENT: NORMAL
BH CV LOWER VASCULAR RIGHT COMMON FEMORAL COMPETENT: NORMAL
BH CV LOWER VASCULAR RIGHT COMMON FEMORAL COMPRESS: NORMAL
BH CV LOWER VASCULAR RIGHT COMMON FEMORAL PHASIC: NORMAL
BH CV LOWER VASCULAR RIGHT COMMON FEMORAL SPONT: NORMAL
BH CV LOWER VASCULAR RIGHT DISTAL FEMORAL COMPRESS: NORMAL
BH CV LOWER VASCULAR RIGHT GASTRONEMIUS COMPRESS: NORMAL
BH CV LOWER VASCULAR RIGHT GREATER SAPH AK COMPRESS: NORMAL
BH CV LOWER VASCULAR RIGHT GREATER SAPH BK COMPRESS: NORMAL
BH CV LOWER VASCULAR RIGHT LESSER SAPH COMPRESS: NORMAL
BH CV LOWER VASCULAR RIGHT MID FEMORAL AUGMENT: NORMAL
BH CV LOWER VASCULAR RIGHT MID FEMORAL COMPETENT: NORMAL
BH CV LOWER VASCULAR RIGHT MID FEMORAL COMPRESS: NORMAL
BH CV LOWER VASCULAR RIGHT MID FEMORAL PHASIC: NORMAL
BH CV LOWER VASCULAR RIGHT MID FEMORAL SPONT: NORMAL
BH CV LOWER VASCULAR RIGHT PERONEAL COMPRESS: NORMAL
BH CV LOWER VASCULAR RIGHT POPLITEAL AUGMENT: NORMAL
BH CV LOWER VASCULAR RIGHT POPLITEAL COMPETENT: NORMAL
BH CV LOWER VASCULAR RIGHT POPLITEAL COMPRESS: NORMAL
BH CV LOWER VASCULAR RIGHT POPLITEAL PHASIC: NORMAL
BH CV LOWER VASCULAR RIGHT POPLITEAL SPONT: NORMAL
BH CV LOWER VASCULAR RIGHT POSTERIOR TIBIAL COMPRESS: NORMAL
BH CV LOWER VASCULAR RIGHT PROFUNDA FEMORAL COMPRESS: NORMAL
BH CV LOWER VASCULAR RIGHT PROXIMAL FEMORAL COMPRESS: NORMAL
BH CV LOWER VASCULAR RIGHT SAPHENOFEMORAL JUNCTION COMPRESS: NORMAL
BILIRUB SERPL-MCNC: 0.7 MG/DL (ref 0–1.2)
BUN SERPL-MCNC: 7 MG/DL (ref 8–23)
BUN/CREAT SERPL: 9.5 (ref 7–25)
CALCIUM SPEC-SCNC: 9.1 MG/DL (ref 8.6–10.5)
CHLORIDE SERPL-SCNC: 107 MMOL/L (ref 98–107)
CO2 SERPL-SCNC: 26.6 MMOL/L (ref 22–29)
CREAT SERPL-MCNC: 0.74 MG/DL (ref 0.76–1.27)
D-LACTATE SERPL-SCNC: 0.9 MMOL/L (ref 0.5–2)
DEPRECATED RDW RBC AUTO: 45.5 FL (ref 37–54)
EGFRCR SERPLBLD CKD-EPI 2021: 100.6 ML/MIN/1.73
EOSINOPHIL # BLD AUTO: 0.31 10*3/MM3 (ref 0–0.4)
EOSINOPHIL NFR BLD AUTO: 2.6 % (ref 0.3–6.2)
ERYTHROCYTE [DISTWIDTH] IN BLOOD BY AUTOMATED COUNT: 13.8 % (ref 12.3–15.4)
FERRITIN SERPL-MCNC: 292 NG/ML (ref 30–400)
GLOBULIN UR ELPH-MCNC: 2.8 GM/DL
GLUCOSE SERPL-MCNC: 100 MG/DL (ref 65–99)
HAPTOGLOB SERPL-MCNC: <10 MG/DL (ref 30–200)
HCT VFR BLD AUTO: 27.8 % (ref 37.5–51)
HGB BLD-MCNC: 9.4 G/DL (ref 13–17.7)
IMM GRANULOCYTES # BLD AUTO: 0.11 10*3/MM3 (ref 0–0.05)
IMM GRANULOCYTES NFR BLD AUTO: 0.9 % (ref 0–0.5)
IRON 24H UR-MRATE: 56 MCG/DL (ref 59–158)
IRON 24H UR-MRATE: 56 MCG/DL (ref 59–158)
IRON SATN MFR SERPL: 19 % (ref 20–50)
LDH SERPL-CCNC: 239 U/L (ref 135–225)
LYMPHOCYTES # BLD AUTO: 2.72 10*3/MM3 (ref 0.7–3.1)
LYMPHOCYTES NFR BLD AUTO: 22.8 % (ref 19.6–45.3)
MCH RBC QN AUTO: 31.1 PG (ref 26.6–33)
MCHC RBC AUTO-ENTMCNC: 33.8 G/DL (ref 31.5–35.7)
MCV RBC AUTO: 92.1 FL (ref 79–97)
MONOCYTES # BLD AUTO: 1.58 10*3/MM3 (ref 0.1–0.9)
MONOCYTES NFR BLD AUTO: 13.2 % (ref 5–12)
NEUTROPHILS NFR BLD AUTO: 59.8 % (ref 42.7–76)
NEUTROPHILS NFR BLD AUTO: 7.15 10*3/MM3 (ref 1.7–7)
NRBC BLD AUTO-RTO: 1.1 /100 WBC (ref 0–0.2)
PLATELET # BLD AUTO: 222 10*3/MM3 (ref 140–450)
PMV BLD AUTO: 11.8 FL (ref 6–12)
POTASSIUM SERPL-SCNC: 4.5 MMOL/L (ref 3.5–5.2)
PROT SERPL-MCNC: 6.2 G/DL (ref 6–8.5)
RBC # BLD AUTO: 3.02 10*6/MM3 (ref 4.14–5.8)
RETICS # AUTO: 0.14 10*6/MM3 (ref 0.02–0.13)
RETICS/RBC NFR AUTO: 4.51 % (ref 0.7–1.9)
SODIUM SERPL-SCNC: 140 MMOL/L (ref 136–145)
TIBC SERPL-MCNC: 289 MCG/DL (ref 298–536)
TRANSFERRIN SERPL-MCNC: 194 MG/DL (ref 200–360)
VIT B12 BLD-MCNC: 983 PG/ML (ref 211–946)
WBC NRBC COR # BLD AUTO: 11.95 10*3/MM3 (ref 3.4–10.8)

## 2024-01-26 PROCEDURE — 82607 VITAMIN B-12: CPT | Performed by: INTERNAL MEDICINE

## 2024-01-26 PROCEDURE — 25010000002 HYDROMORPHONE PER 4 MG: Performed by: INTERNAL MEDICINE

## 2024-01-26 PROCEDURE — 83605 ASSAY OF LACTIC ACID: CPT | Performed by: INTERNAL MEDICINE

## 2024-01-26 PROCEDURE — 85014 HEMATOCRIT: CPT | Performed by: INTERNAL MEDICINE

## 2024-01-26 PROCEDURE — 84466 ASSAY OF TRANSFERRIN: CPT | Performed by: INTERNAL MEDICINE

## 2024-01-26 PROCEDURE — 83540 ASSAY OF IRON: CPT | Performed by: INTERNAL MEDICINE

## 2024-01-26 PROCEDURE — 85660 RBC SICKLE CELL TEST: CPT | Performed by: INTERNAL MEDICINE

## 2024-01-26 PROCEDURE — 82728 ASSAY OF FERRITIN: CPT | Performed by: INTERNAL MEDICINE

## 2024-01-26 PROCEDURE — 83021 HEMOGLOBIN CHROMOTOGRAPHY: CPT | Performed by: INTERNAL MEDICINE

## 2024-01-26 PROCEDURE — 25810000003 SODIUM CHLORIDE 0.9 % SOLUTION: Performed by: INTERNAL MEDICINE

## 2024-01-26 PROCEDURE — 83020 HEMOGLOBIN ELECTROPHORESIS: CPT | Performed by: INTERNAL MEDICINE

## 2024-01-26 PROCEDURE — 83921 ORGANIC ACID SINGLE QUANT: CPT | Performed by: INTERNAL MEDICINE

## 2024-01-26 PROCEDURE — 82747 ASSAY OF FOLIC ACID RBC: CPT | Performed by: INTERNAL MEDICINE

## 2024-01-26 PROCEDURE — 80053 COMPREHEN METABOLIC PANEL: CPT | Performed by: INTERNAL MEDICINE

## 2024-01-26 PROCEDURE — 85045 AUTOMATED RETICULOCYTE COUNT: CPT | Performed by: INTERNAL MEDICINE

## 2024-01-26 PROCEDURE — 25010000002 ONDANSETRON PER 1 MG: Performed by: INTERNAL MEDICINE

## 2024-01-26 PROCEDURE — 85025 COMPLETE CBC W/AUTO DIFF WBC: CPT | Performed by: INTERNAL MEDICINE

## 2024-01-26 PROCEDURE — 93970 EXTREMITY STUDY: CPT

## 2024-01-26 PROCEDURE — 99222 1ST HOSP IP/OBS MODERATE 55: CPT | Performed by: INTERNAL MEDICINE

## 2024-01-26 PROCEDURE — 83615 LACTATE (LD) (LDH) ENZYME: CPT | Performed by: INTERNAL MEDICINE

## 2024-01-26 PROCEDURE — 83010 ASSAY OF HAPTOGLOBIN QUANT: CPT | Performed by: INTERNAL MEDICINE

## 2024-01-26 RX ORDER — HYDROMORPHONE HYDROCHLORIDE 1 MG/ML
0.25 INJECTION, SOLUTION INTRAMUSCULAR; INTRAVENOUS; SUBCUTANEOUS
Status: DISCONTINUED | OUTPATIENT
Start: 2024-01-26 | End: 2024-01-27 | Stop reason: HOSPADM

## 2024-01-26 RX ORDER — TAMSULOSIN HYDROCHLORIDE 0.4 MG/1
0.4 CAPSULE ORAL 2 TIMES DAILY
Status: DISCONTINUED | OUTPATIENT
Start: 2024-01-26 | End: 2024-01-27 | Stop reason: HOSPADM

## 2024-01-26 RX ORDER — MULTIPLE VITAMINS W/ MINERALS TAB 9MG-400MCG
1 TAB ORAL DAILY
Status: DISCONTINUED | OUTPATIENT
Start: 2024-01-26 | End: 2024-01-27 | Stop reason: HOSPADM

## 2024-01-26 RX ORDER — FOLIC ACID 1 MG/1
1 TABLET ORAL DAILY
Status: DISCONTINUED | OUTPATIENT
Start: 2024-01-26 | End: 2024-01-27 | Stop reason: HOSPADM

## 2024-01-26 RX ORDER — NALOXONE HCL 0.4 MG/ML
0.4 VIAL (ML) INJECTION
Status: DISCONTINUED | OUTPATIENT
Start: 2024-01-26 | End: 2024-01-27 | Stop reason: HOSPADM

## 2024-01-26 RX ADMIN — PANTOPRAZOLE SODIUM 40 MG: 40 TABLET, DELAYED RELEASE ORAL at 08:47

## 2024-01-26 RX ADMIN — SODIUM CHLORIDE 125 ML/HR: 9 INJECTION, SOLUTION INTRAVENOUS at 03:57

## 2024-01-26 RX ADMIN — MONTELUKAST SODIUM 10 MG: 10 TABLET, FILM COATED ORAL at 21:20

## 2024-01-26 RX ADMIN — SENNOSIDES AND DOCUSATE SODIUM 2 TABLET: 50; 8.6 TABLET ORAL at 08:48

## 2024-01-26 RX ADMIN — ONDANSETRON HYDROCHLORIDE 4 MG: 2 INJECTION, SOLUTION INTRAMUSCULAR; INTRAVENOUS at 08:47

## 2024-01-26 RX ADMIN — HYDROMORPHONE HYDROCHLORIDE 0.5 MG: 1 INJECTION, SOLUTION INTRAMUSCULAR; INTRAVENOUS; SUBCUTANEOUS at 03:57

## 2024-01-26 RX ADMIN — TAMSULOSIN HYDROCHLORIDE 0.4 MG: 0.4 CAPSULE ORAL at 21:20

## 2024-01-26 RX ADMIN — FOLIC ACID 1 MG: 1 TABLET ORAL at 12:13

## 2024-01-26 RX ADMIN — CETIRIZINE HYDROCHLORIDE 10 MG: 10 TABLET ORAL at 08:47

## 2024-01-26 RX ADMIN — Medication 1 TABLET: at 12:13

## 2024-01-26 RX ADMIN — AMLODIPINE BESYLATE 5 MG: 5 TABLET ORAL at 08:48

## 2024-01-26 RX ADMIN — HYDROCODONE BITARTRATE AND ACETAMINOPHEN 1 TABLET: 5; 325 TABLET ORAL at 21:20

## 2024-01-26 RX ADMIN — HYDROCODONE BITARTRATE AND ACETAMINOPHEN 1 TABLET: 5; 325 TABLET ORAL at 12:13

## 2024-01-26 RX ADMIN — SENNOSIDES AND DOCUSATE SODIUM 2 TABLET: 50; 8.6 TABLET ORAL at 21:20

## 2024-01-26 RX ADMIN — Medication 1 TABLET: at 08:48

## 2024-01-26 RX ADMIN — HYDROMORPHONE HYDROCHLORIDE 0.5 MG: 1 INJECTION, SOLUTION INTRAMUSCULAR; INTRAVENOUS; SUBCUTANEOUS at 07:11

## 2024-01-26 RX ADMIN — TAMSULOSIN HYDROCHLORIDE 0.4 MG: 0.4 CAPSULE ORAL at 08:48

## 2024-01-26 RX ADMIN — HYDROMORPHONE HYDROCHLORIDE 0.5 MG: 1 INJECTION, SOLUTION INTRAMUSCULAR; INTRAVENOUS; SUBCUTANEOUS at 01:25

## 2024-01-26 RX ADMIN — HYDROCODONE BITARTRATE AND ACETAMINOPHEN 1 TABLET: 5; 325 TABLET ORAL at 17:25

## 2024-01-26 RX ADMIN — OXYCODONE HYDROCHLORIDE AND ACETAMINOPHEN 500 MG: 500 TABLET ORAL at 08:48

## 2024-01-26 NOTE — CONSULTS
Subjective     REASON FOR CONSULTATION: Sickle cell disease with pain  Provide an opinion on any further workup or treatment                             REQUESTING PHYSICIAN:      RECORDS OBTAINED:  Records of the patients history including those obtained from the referring provider were reviewed and summarized in detail.    HISTORY OF PRESENT ILLNESS:  The patient is a 63 y.o. year old male who is here for an opinion about the above issue.    History of Present Illness     Patient is a 63-year-old gentleman with history of sickle cell disease followed at [facility], history of hepatitis C treated, history of sleep apnea and benign prostatic hyperplasia, he has had sickle cell crisis many years ago.  He was fine when he went to bed last night but he woke up this morning with pain and discomfort in the left knee which is progressively getting worse reminding him of the similar episode that he had a long time ago with sickle cell crisis.  Patient came to the ER for that.  He did have meniscal tear 10 years ago to the repair then torn meniscus in the left knee in the past.  Plain x-ray in the emergency room did not show any acute fracture or dislocation and minimal effusion in the knee joint me and noted there is moderate tibiofemoral joint space narrowing and arthritis      Patient came in with acute painful left knee in the setting of prior knee surgery for meniscal tear and history of sickle cell disease 15 years ago he had a crisis.  Currently he does not have any fever or chills, no fatigue or weakness.  Plain x-ray has been negative patient has been placed on IV fluids and started on pain medications.  They are planning to consult orthopedic if it does not improve.  Because of his history of PE after gallbladder surgery they wanted to check Doppler of the extremities.  Past Medical History:   Diagnosis Date    Acid reflux     Allergic     Anxiety     Arthritis     Benign essential HTN 04/21/2017     BPH (benign prostatic hyperplasia)     Depression     DVT (deep venous thrombosis)     Eczema     Heart murmur     Hemorrhoid     Hepatitis C     history cleared    History of pneumonia     History of transfusion     Sickle cell anemia     Sinus problem     Sleep apnea     cpap    Tinnitus         Past Surgical History:   Procedure Laterality Date    CHOLECYSTECTOMY      COLONOSCOPY  2012    ENDOSCOPY  02/22/2005    HEMORRHOIDECTOMY N/A 6/23/2016    Procedure: HEMORRHOIDECTOMY;  Surgeon: Ct Recio MD;  Location: [facility] OR;  Service:     LIPOMA EXCISION      LYMPHADENECTOMY      WHIPPLE PROCEDURE  07/26/2019    UL    WRIST SURGERY Right     fractured and had plate put in        No current facility-administered medications on file prior to encounter.     Current Outpatient Medications on File Prior to Encounter   Medication Sig Dispense Refill    amLODIPine (NORVASC) 5 MG tablet TAKE 1 TABLET BY MOUTH DAILY 90 tablet 1    azithromycin (Zithromax Z-Alberto) 250 MG tablet Take 2 tablets by mouth on day 1, then 1 tablet daily on days 2-5 (Patient taking differently: Take  by mouth See Admin Instructions. Take 2 tablets by mouth on day 1, then 1 tablet daily on days 2-5) 6 tablet 0    benzonatate (TESSALON) 200 MG capsule Take 1 capsule by mouth 3 (Three) Times a Day As Needed for Cough. 21 capsule 0    cetirizine (zyrTEC) 10 MG tablet Take 1 tablet by mouth Daily.      LORazepam (ATIVAN) 0.5 MG tablet TAKE 1 TABLET BY MOUTH EVERY 8 HOURS AS NEEDED FOR ANXIETY (Patient taking differently: Take 1 tablet by mouth Every 8 (Eight) Hours As Needed.) 25 tablet 0    montelukast (SINGULAIR) 10 MG tablet TAKE 1 TABLET BY MOUTH AT BEDTIME (Patient taking differently: Take 1 tablet by mouth Daily.) 90 tablet 2    Multiple Vitamins-Minerals (MULTIVITAMIN PO) Take 1 tablet by mouth Daily.      ondansetron (Zofran) 4 MG tablet Take 1 tablet by mouth Every 8 (Eight) Hours As Needed for Nausea or Vomiting. 30 tablet 0     ondansetron ODT (ZOFRAN-ODT) 4 MG disintegrating tablet Place 1 tablet on the tongue Every 4 (Four) Hours. 12 tablet 0    pantoprazole (PROTONIX) 40 MG EC tablet Take 1 tablet by mouth Daily.      sertraline (ZOLOFT) 25 MG tablet TAKE 1 TABLET BY MOUTH DAILY 30 tablet 1    tamsulosin (FLOMAX) 0.4 MG capsule 24 hr capsule Take 1 capsule by mouth 2 (Two) Times a Day.  4    triamcinolone (KENALOG) 0.025 % cream APPLY TOPICALLY TO THE AFFECTED AREA THREE TIMES DAILY (Patient taking differently: Apply 1 application  topically to the appropriate area as directed 3 (Three) Times a Day.) 240 g 0    Ascorbic Acid (VITAMIN C PO) Take 1 dose by mouth Daily.      famciclovir (FAMVIR) 500 MG tablet TAKE 2 TABLETS BY MOUTH TWICE DAILY AS NEEDED FOR ONSET OF SYMPTOMS 24 tablet 0    loperamide (IMODIUM) 2 MG capsule Take 1 capsule by mouth 4 (Four) Times a Day As Needed for Diarrhea. 6 capsule 1    nitroglycerin (NITROSTAT) 0.4 MG SL tablet Place 1 tablet under the tongue Every 5 (Five) Minutes As Needed for Chest Pain. Take no more than 3 doses in 15 minutes. 30 tablet 0    nystatin (MYCOSTATIN) 100,000 unit/mL suspension SWISH AND SPIT 5 ML BY MOUTH FOUR TIMES DAILY 60 mL 1        ALLERGIES:    Allergies   Allergen Reactions    Levofloxacin Swelling    Penicillins Swelling    Oxycodone-Acetaminophen GI Intolerance    Sulfamethoxazole-Trimethoprim Rash        Social History     Socioeconomic History    Marital status:    Tobacco Use    Smoking status: Former     Packs/day: 0.25     Years: 10.00     Additional pack years: 0.00     Total pack years: 2.50     Types: Cigarettes     Quit date: 1981     Years since quittin.6    Smokeless tobacco: Never   Vaping Use    Vaping Use: Never used   Substance and Sexual Activity    Alcohol use: No    Drug use: No    Sexual activity: Defer        Family History   Problem Relation Age of Onset    Heart attack Maternal Uncle     Arthritis Mother         Review of Systems    Constitutional:  Negative for appetite change, chills, diaphoresis, fatigue, fever and unexpected weight change.   HENT:  Negative for hearing loss, sore throat and trouble swallowing.    Respiratory:  Negative for cough, chest tightness, shortness of breath and wheezing.    Cardiovascular:  Negative for chest pain, palpitations and leg swelling.   Gastrointestinal:  Negative for abdominal distention, abdominal pain, constipation, diarrhea, nausea and vomiting.   Genitourinary:  Negative for dysuria, frequency, hematuria and urgency.   Musculoskeletal:  Negative for joint swelling.        No muscle weakness.   Skin:  Negative for rash and wound.   Neurological:  Negative for seizures, syncope, speech difficulty, weakness, numbness and headaches.   Hematological:  Negative for adenopathy. Does not bruise/bleed easily.   Psychiatric/Behavioral:  Negative for behavioral problems, confusion and suicidal ideas.       Patient has severe left knee pain  Objective     Vitals:    01/25/24 2028 01/26/24 0018 01/26/24 0400 01/26/24 0944   BP: 114/63 112/64 107/58 128/74   BP Location: Left arm Right arm Right arm Left arm   Patient Position:  Sitting Lying Lying   Pulse: 61 61 62 66   Resp: 24 16 16 16   Temp: 98.1 °F (36.7 °C) 97.7 °F (36.5 °C) 99.2 °F (37.3 °C) 98.8 °F (37.1 °C)   TempSrc:  Oral     SpO2: 95% 96% 94% 96%   Weight:       Height:              No data to display                Physical Exam      RESPIRATORY:  Normal respiratory effort.  No rales  or wheezing, clear.   CARDIOVASCULAR:  Regular rate and rhythm, no murmur  No significant lower extremity edema.  Abdomen: Soft nontender positive bowel sounds no hepatosplenomegaly  SKIN: No wounds.  No rashes.  MUSCULOSKELETAL/EXTREMITIES: No clubbing or cyanosis.  No apparent unilateral weakness.  NEURO: CN 2-12 appear intact. No focal neurological deficits noted.  PSYCHIATRIC:  Normal judgment and insight.  Normal mood and affect.      RECENT LABS:  Hematology  WBC   Date Value Ref Range Status   01/26/2024 11.95 (H) 3.40 - 10.80 10*3/mm3 Final     RBC   Date Value Ref Range Status   01/26/2024 3.02 (L) 4.14 - 5.80 10*6/mm3 Final     Hemoglobin   Date Value Ref Range Status   01/26/2024 9.4 (L) 13.0 - 17.7 g/dL Final     Hematocrit   Date Value Ref Range Status   01/26/2024 27.8 (L) 37.5 - 51.0 % Final     Platelets   Date Value Ref Range Status   01/26/2024 222 140 - 450 10*3/mm3 Final          Assessment & Plan     #.  Questionable sickle cell crisis  Patient is a 63-year-old gentleman with history of sickle cell disease followed at [facility], history of hepatitis C treated, history of sleep apnea and benign prostatic hyperplasia, he has had sickle cell crisis many years ago.    He was fine when he went to bed last night but he woke up this morning with pain and discomfort in the left knee which is progressively getting worse reminding him of the similar episode that he had a long time ago with sickle cell crisis.  Patient came to the ER for that.    He did have meniscal tear 10 years ago to the repair then torn meniscus in the left knee in the past.  Plain x-ray in the emergency room did not show any acute fracture or dislocation and minimal effusion in the knee joint me and noted there is moderate tibiofemoral joint space narrowing and arthritis    Patient came in with acute painful left knee in the setting of prior knee surgery for meniscal tear and history of sickle cell disease 15 years ago he had a crisis. \   Currently he does not have any fever or chills, no fatigue or weakness.  Plain x-ray has been negative patient has been placed on IV fluids and started on pain medications.    They are planning to consult orthopedic if it does not improve.    Because of his history of PE after gallbladder surgery they wanted to check Doppler of the extremities.    Plan     Patient was admitted with severe left knee pain  Patient had meniscal tear in the  past  Patient started on IV fluids and pain medications  If he does not improve they will consult orthopedic in order to look for meniscal tear in the knee  Nothing to add at this point  Will check anemia work up  Will check hemoglobin electrophoresis    Maria Guadalupe Wolfe MD

## 2024-01-26 NOTE — PROGRESS NOTES
[facility] Medicine Services  PROGRESS NOTE    Patient Name: Henrry Glover  : 1960  MRN: [id number]    Date of Admission: 2024  Primary Care Physician: Henny Patrick MD    Subjective   Subjective     CC:  Left knee pain, patient is concerned he is having sickle cell crisis    Subjective:  Patient noting still intermittent episodes of severe leg pain.  He feels the pain is severe and is requesting pain medication.  He denies any injury or trauma to the knee.  He does note previous surgical history noted.    Review of Systems  No current fevers or chills  No current shortness of breath or cough  No current nausea, vomiting, or diarrhea  No current chest pain or palpitations       Objective   Objective     Vital Signs:   Temp:  [97.3 °F (36.3 °C)-99.2 °F (37.3 °C)] 98.8 °F (37.1 °C)  Heart Rate:  [61-73] 66  Resp:  [16-24] 16  BP: (107-142)/(58-83) 128/74        Physical Exam:  Constitutional:Awake, alert  HENT: NCAT, mucous membranes moist, neck supple  Respiratory: No cough or wheezes, normal respirations, nonlabored breathing   Cardiovascular: Pulse rate is normal, palpable radial pulses  Gastrointestinal:  soft, nontender, nondistended  Musculoskeletal: Left knee with tenderness to palpation and some effusion, otherwise normal musculature for age, no lower extremity edema, BMI 24  Psychiatric: Mildly anxious affect, cooperative, conversational  Neurologic: No slurred speech or facial droop, follows commands  Skin: No rashes or jaundice, warm      Results Reviewed:  Results from last 7 days   Lab Units 24  0623 24  1327   WBC 10*3/mm3 11.95* 14.25*   HEMOGLOBIN g/dL 9.4* 10.9*   HEMATOCRIT % 27.8* 33.6*   PLATELETS 10*3/mm3 222 257     Results from last 7 days   Lab Units 24  0623 24  1327   SODIUM mmol/L 140 143   POTASSIUM mmol/L 4.5 4.4   CHLORIDE mmol/L 107 107   CO2 mmol/L 26.6 25.9   BUN mg/dL 7* 8   CREATININE mg/dL 0.74* 0.75*   GLUCOSE mg/dL 100* 90   CALCIUM  mg/dL 9.1 9.5   ALK PHOS U/L 82 101   ALT (SGPT) U/L 19 24   AST (SGOT) U/L 21 25     Estimated Creatinine Clearance: 105.3 mL/min (A) (by C-G formula based on SCr of 0.74 mg/dL (L)).    Microbiology Results Abnormal       None            Imaging Results (Last 24 Hours)       Procedure Component Value Units Date/Time    XR Knee 1 or 2 View Left [[id number]] Collected: 01/25/24 1239     Updated: 01/25/24 1244    Narrative:      XR KNEE 1 OR 2 VW LEFT-     INDICATIONS: Pain, no injury     TECHNIQUE: 2 VIEWS OF THE left knee     COMPARISON: None available     FINDINGS:     No acute fracture, erosion, or dislocation is identified. Minimal knee  effusion is noted. Moderate tibiofemoral joint space narrowing is seen.  Mild degenerative spurring is noted. Arterial calcifications are  present. Follow-up/further evaluation can be obtained as indications  persist.       Impression:         As described.           This report was finalized on 1/25/2024 12:41 PM by Dr. Kodi Gonsalez M.D on Workstation: [id number]               Results for orders placed during the hospital encounter of 12/22/22    Adult Transthoracic Echo Complete W/ Cont if Necessary Per Protocol    Interpretation Summary    Left ventricular systolic function is normal. Calculated left ventricular EF = 67.3%    Left ventricular diastolic function was normal.    Left atrial volume is mildly increased.    Estimated right ventricular systolic pressure from tricuspid regurgitation is mildly elevated (35-45 mmHg). Calculated right ventricular systolic pressure from tricuspid regurgitation is 42 mmHg.    Mild pulmonary hypertension is present.    Mild dilation of the ascending aorta is present.      I have reviewed the medications:  Scheduled Meds:amLODIPine, 5 mg, Oral, Daily  vitamin C, 500 mg, Oral, Daily  cetirizine, 10 mg, Oral, Daily  montelukast, 10 mg, Oral, Nightly  multivitamin, 1 tablet, Oral, Daily  pantoprazole, 40 mg, Oral, Daily  senna-docusate  sodium, 2 tablet, Oral, BID  sertraline, 25 mg, Oral, Daily  sodium chloride, 10 mL, Intravenous, Q12H  tamsulosin, 0.4 mg, Oral, Daily  triamcinolone, 1 application , Topical, TID      Continuous Infusions:sodium chloride, 75 mL/hr, Last Rate: 75 mL/hr (01/26/24 0930)      PRN Meds:.  acetaminophen **OR** acetaminophen **OR** acetaminophen    benzonatate    senna-docusate sodium **AND** polyethylene glycol **AND** bisacodyl **AND** bisacodyl    Calcium Replacement - Follow Nurse / BPA Driven Protocol    HYDROcodone-acetaminophen    HYDROmorphone **AND** naloxone    LORazepam    Magnesium Standard Dose Replacement - Follow Nurse / BPA Driven Protocol    nitroglycerin    ondansetron    Phosphorus Replacement - Follow Nurse / BPA Driven Protocol    Potassium Replacement - Follow Nurse / BPA Driven Protocol    [COMPLETED] Insert Peripheral IV **AND** sodium chloride    sodium chloride    sodium chloride    Assessment & Plan   Assessment & Plan     Active Hospital Problems    Diagnosis  POA    **Sickle cell disease with crisis [D57.00]  Yes    Left knee pain [M25.562]  Yes    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        Brief Hospital Course to date:  Henrry Glover is a 63 y.o. male presents to the hospital with left knee pain stating that the symptoms are similar to previous sickle cell disease crisis.  He was admitted with IV fluid for possible sickle crisis.    Plan:    Possible sickle cell crisis:  IV fluid rate adjusted today.  Pain medication dose adjusted today.  Consult hematology oncology to weigh in.  I do not see any mention currently on the CBC differential of sickling.  Plan to discuss with hospital laboratory  to get further input.  Add multivitamin and folic acid.  Supportive care and symptom treatment.    Left knee pain:  X-ray images reviewed and it is noted patient has arthritis and effusion.  Possibly due to sickle cell crisis or alternatively patient reports previous meniscus surgery on this knee and this could be simply musculoskeletal pain.  Plan to get orthopedic surgery input.    Essential hypertension: Currently blood pressure is controlled.  Continue amlodipine.    BPH: Continue Flomax.  Stable.    GERD: Continue PPI.  Stable.    Anxiety: Continue SSRI.    Treatment plan discussed with patient who is in agreement    DVT Prophylaxis: Mechanical      Disposition: Home when improved    CODE STATUS:   Code Status and Medical Interventions:   Ordered at: 01/25/24 1722     Code Status (Patient has no pulse and is not breathing):    CPR (Attempt to Resuscitate)     Medical Interventions (Patient has pulse or is breathing):    Full Support       Parker Hill MD  01/26/24

## 2024-01-26 NOTE — DISCHARGE PLACEMENT REQUEST
"Eric Glover (63 y.o. Male)       Date of Birth   1960    Social Security Number       Address   [address] [address]    Home Phone   [phone]    MRN   [id number]       [facility]    Marital Status                               Admission Date   1/25/24    Admission Type   Emergency    Admitting Provider   India Barksdale MD    Attending Provider   Parker Hill MD    Department, Room/Bed   [address], P485/1       Discharge Date       Discharge Disposition       Discharge Destination                                 Attending Provider: Parker Hill MD    Allergies: Levofloxacin, Penicillins, Oxycodone-acetaminophen, Sulfamethoxazole-trimethoprim    Isolation: None   Infection: MRSA/History Only (07/13/23)   Code Status: CPR    Ht: 175.3 cm (69\")   Wt: 74.8 kg (165 lb)    Admission Cmt: None   Principal Problem: Sickle cell disease with crisis [D57.00]                   Active Insurance as of 1/25/2024       Primary Coverage       Payor Plan Insurance Group Employer/Plan Group    HU[employer] MEDICARE REPLACEMENT HU[employer] MED ADV GROUP [id number]       Payor Plan Address Payor Plan Phone Number Payor Plan Fax Number Effective Dates    [address] [address] [phone]  3/1/2023 - None Entered    [facility] [address]         Subscriber Name Subscriber Birth Date Member ID       ERIC GLOVER 1960 [id number]                     Emergency Contacts        (Rel.) Home Phone Work Phone Mobile Phone    Sayda Glover (Spouse) [phone] -- [phone]                "

## 2024-01-26 NOTE — PROGRESS NOTES
I came to evaluate the patient for left knee pain.  Unfortunate the patient is off the floor for other testing.  In review of the chart it is clear that the patient has a history of sickle cell disease.  I have reviewed the x-rays which show advanced arthritic change of the knee.  The knee pain could quite possibly be an arthritic flareup versus sickle cell pain crisis.  I will return later either this evening or in the morning to see the patient.  I will order up materials for aspiration and steroid injection when I return.  I would asked that [facility] physician comments on whether or not a knee steroid injection would be contraindicated in this individual from an overall medical standpoint.    Electronically signed by Louis Gimenez MD, 01/26/24, 1:37 PM EST.

## 2024-01-26 NOTE — PLAN OF CARE
Problem: Adult Inpatient Plan of Care  Goal: Plan of Care Review  Outcome: Ongoing, Progressing  Flowsheets (Taken 1/26/2024 9439)  Progress: no change  Plan of Care Reviewed With: patient  Outcome Evaluation: Patient is alert and oriented. Patient slept between care. IVF infusing. PRN Dilaudid given for pain per patient request. VSS. On Continuous pulse ox. Scheduled meds given. Continue with plan of care.   Goal Outcome Evaluation:  Plan of Care Reviewed With: patient        Progress: no change  Outcome Evaluation: Patient is alert and oriented. Patient slept between care. IVF infusing. PRN Dilaudid given for pain per patient request. VSS. On Continuous pulse ox. Scheduled meds given. Continue with plan of care.

## 2024-01-26 NOTE — PROGRESS NOTES
Agree that knee is most suspicious for arthritic related pain.  Patient does have mild leukocytosis.  May be best to aspirate a sample of the knee effusion first and send for studies before considering a later steroid injection just to rule low likelihood of infection out.  Once effusion fluid has been studied and ruled out for infection then I do not see a contraindication to steroid injection as long as hematology agrees with plan.    Electronically signed by Parker Hill MD, 01/26/24, 2:36 PM EST.

## 2024-01-26 NOTE — CASE MANAGEMENT/SOCIAL WORK
Continued Stay Note  [facility]     Patient Name: Henrry Glover  MRN: [id number]  Today's Date: 1/26/2024    Admit Date: 1/25/2024    Plan: DC home, no needs   Discharge Plan       Row Name 01/26/24 1454       Plan    Plan DC home, no needs    Plan Comments CCP spoke to pt, introduced self, role and discussed dc needs/ plan.  pt lives in a 2 story home with his wife and with 17 steps.  He uses the handrails for safety.  He is [employer], drives and loves to go on vacation with his wife.  He uses no DME.  His pharmacy is [facility] [address] and has no issues with medication copays.  His plan is to return home.  He drove here and will drive himself home.  CCP will follow. Thanks, Isis MARTE                   Discharge Codes    No documentation.                       Isis Spann

## 2024-01-27 ENCOUNTER — READMISSION MANAGEMENT (OUTPATIENT)
Dept: CALL CENTER | Facility: HOSPITAL | Age: 64
End: 2024-01-27
Payer: MEDICARE

## 2024-01-27 VITALS
HEIGHT: 69 IN | TEMPERATURE: 97.9 F | BODY MASS INDEX: 24.44 KG/M2 | HEART RATE: 64 BPM | SYSTOLIC BLOOD PRESSURE: 120 MMHG | DIASTOLIC BLOOD PRESSURE: 68 MMHG | WEIGHT: 165 LBS | RESPIRATION RATE: 16 BRPM | OXYGEN SATURATION: 94 %

## 2024-01-27 PROBLEM — D72.829 LEUKOCYTOSIS: Status: ACTIVE | Noted: 2024-01-27

## 2024-01-27 PROBLEM — D57.00 SICKLE CELL DISEASE WITH CRISIS: Status: RESOLVED | Noted: 2024-01-25 | Resolved: 2024-01-27

## 2024-01-27 LAB
ALBUMIN SERPL-MCNC: 3.5 G/DL (ref 3.5–5.2)
ALBUMIN/GLOB SERPL: 1.1 G/DL
ALP SERPL-CCNC: 81 U/L (ref 39–117)
ALT SERPL W P-5'-P-CCNC: 19 U/L (ref 1–41)
ANION GAP SERPL CALCULATED.3IONS-SCNC: 8.4 MMOL/L (ref 5–15)
AST SERPL-CCNC: 20 U/L (ref 1–40)
BASOPHILS # BLD AUTO: 0.06 10*3/MM3 (ref 0–0.2)
BASOPHILS NFR BLD AUTO: 0.5 % (ref 0–1.5)
BILIRUB SERPL-MCNC: 0.9 MG/DL (ref 0–1.2)
BUN SERPL-MCNC: 11 MG/DL (ref 8–23)
BUN/CREAT SERPL: 14.5 (ref 7–25)
CALCIUM SPEC-SCNC: 9.1 MG/DL (ref 8.6–10.5)
CHLORIDE SERPL-SCNC: 106 MMOL/L (ref 98–107)
CO2 SERPL-SCNC: 24.6 MMOL/L (ref 22–29)
CREAT SERPL-MCNC: 0.76 MG/DL (ref 0.76–1.27)
DEPRECATED RDW RBC AUTO: 44.7 FL (ref 37–54)
EGFRCR SERPLBLD CKD-EPI 2021: 99.7 ML/MIN/1.73
EOSINOPHIL # BLD AUTO: 0.41 10*3/MM3 (ref 0–0.4)
EOSINOPHIL NFR BLD AUTO: 3.7 % (ref 0.3–6.2)
ERYTHROCYTE [DISTWIDTH] IN BLOOD BY AUTOMATED COUNT: 13.6 % (ref 12.3–15.4)
GLOBULIN UR ELPH-MCNC: 3.1 GM/DL
GLUCOSE SERPL-MCNC: 102 MG/DL (ref 65–99)
HCT VFR BLD AUTO: 27.6 % (ref 37.5–51)
HGB BLD-MCNC: 9.3 G/DL (ref 13–17.7)
IMM GRANULOCYTES # BLD AUTO: 0.09 10*3/MM3 (ref 0–0.05)
IMM GRANULOCYTES NFR BLD AUTO: 0.8 % (ref 0–0.5)
LYMPHOCYTES # BLD AUTO: 2.4 10*3/MM3 (ref 0.7–3.1)
LYMPHOCYTES NFR BLD AUTO: 21.6 % (ref 19.6–45.3)
MCH RBC QN AUTO: 30.8 PG (ref 26.6–33)
MCHC RBC AUTO-ENTMCNC: 33.7 G/DL (ref 31.5–35.7)
MCV RBC AUTO: 91.4 FL (ref 79–97)
MONOCYTES # BLD AUTO: 1.35 10*3/MM3 (ref 0.1–0.9)
MONOCYTES NFR BLD AUTO: 12.2 % (ref 5–12)
NEUTROPHILS NFR BLD AUTO: 6.8 10*3/MM3 (ref 1.7–7)
NEUTROPHILS NFR BLD AUTO: 61.2 % (ref 42.7–76)
NRBC BLD AUTO-RTO: 0.9 /100 WBC (ref 0–0.2)
PLATELET # BLD AUTO: 213 10*3/MM3 (ref 140–450)
PMV BLD AUTO: 11.8 FL (ref 6–12)
POTASSIUM SERPL-SCNC: 4.4 MMOL/L (ref 3.5–5.2)
PROT SERPL-MCNC: 6.6 G/DL (ref 6–8.5)
RBC # BLD AUTO: 3.02 10*6/MM3 (ref 4.14–5.8)
SODIUM SERPL-SCNC: 139 MMOL/L (ref 136–145)
WBC NRBC COR # BLD AUTO: 11.11 10*3/MM3 (ref 3.4–10.8)

## 2024-01-27 PROCEDURE — 85025 COMPLETE CBC W/AUTO DIFF WBC: CPT | Performed by: INTERNAL MEDICINE

## 2024-01-27 PROCEDURE — 80053 COMPREHEN METABOLIC PANEL: CPT | Performed by: INTERNAL MEDICINE

## 2024-01-27 PROCEDURE — 25810000003 SODIUM CHLORIDE 0.9 % SOLUTION: Performed by: INTERNAL MEDICINE

## 2024-01-27 RX ORDER — ACETAMINOPHEN 325 MG/1
650 TABLET ORAL EVERY 6 HOURS PRN
Start: 2024-01-27

## 2024-01-27 RX ADMIN — Medication 1 TABLET: at 07:56

## 2024-01-27 RX ADMIN — CETIRIZINE HYDROCHLORIDE 10 MG: 10 TABLET ORAL at 07:55

## 2024-01-27 RX ADMIN — AMLODIPINE BESYLATE 5 MG: 5 TABLET ORAL at 07:56

## 2024-01-27 RX ADMIN — FOLIC ACID 1 MG: 1 TABLET ORAL at 07:56

## 2024-01-27 RX ADMIN — PANTOPRAZOLE SODIUM 40 MG: 40 TABLET, DELAYED RELEASE ORAL at 07:57

## 2024-01-27 RX ADMIN — SODIUM CHLORIDE 75 ML/HR: 9 INJECTION, SOLUTION INTRAVENOUS at 01:50

## 2024-01-27 RX ADMIN — OXYCODONE HYDROCHLORIDE AND ACETAMINOPHEN 500 MG: 500 TABLET ORAL at 07:56

## 2024-01-27 RX ADMIN — SENNOSIDES AND DOCUSATE SODIUM 2 TABLET: 50; 8.6 TABLET ORAL at 07:56

## 2024-01-27 RX ADMIN — TAMSULOSIN HYDROCHLORIDE 0.4 MG: 0.4 CAPSULE ORAL at 07:55

## 2024-01-27 RX ADMIN — HYDROCODONE BITARTRATE AND ACETAMINOPHEN 1 TABLET: 5; 325 TABLET ORAL at 01:50

## 2024-01-27 NOTE — DISCHARGE SUMMARY
[facility] Medicine Services  DISCHARGE SUMMARY    Patient Name: Henrry Glover  : 1960  MRN: [id number]    Date of Admission: 2024 11:41 AM  Date of Discharge:   2024  Primary Care Physician: Henny Patrick MD    Consults       Date and Time Order Name Status Description    2024  9:09 AM Hematology & Oncology Inpatient Consult      2024  9:08 AM Inpatient Orthopedic Surgery Consult      2024  2:46 PM [facility] (on-call MD unless specified) Details              Hospital Course       Active Hospital Problems    Diagnosis  POA    Leukocytosis [D72.829]  Yes    Acute pain of left knee [M25.562]  Yes    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 male presents to the hospital with severe acute left knee pain that he felt was similar to previous normal cell crisis.  Patient was found to have elevated reticulocyte count and what appeared to be reactive leukocytosis.  He was evaluated by hematology oncology and orthopedic surgery teams.  Orthopedic surgery team evaluated and felt that patient's pain could be partially related to arthritis and considered a steroid injection but held off due to leukocytosis.  Hematology oncology team sent an electrophoresis but otherwise did not feel they have a lot to offer at this point.  Leukocytosis did improve.  He was fluid resuscitated.  He is hemoglobin remained reasonably stable after initia l slight drop that was likely related to dilution.    After appropriate treatment patient felt his pain crisis was over and was requesting discharge from the  hospital today.  Orthopedic surgery has provided him with contact information for follow-up.  I also recommend he follow-up with hematology oncology to get the results of this hemoglobin electrophoresis and continue to get outpatient management for his reported sickle cell disease.    Patient also agrees to follow-up with his outpatient gastroenterologist who is treating his hepatitis C and reportedly has seen him for a pancreatic neuroendocrine tumor.  Data is somewhat deficient regarding this issue but patient reports it is getting treated outpatient.    Regarding the leukocytosis patient is afebrile and hemodynamically stable and leukocytosis is improved.  After further chart review patient does appear to have somewhat chronically mild leukocytosis around 11-13,000 range dating back several years.  This may be his baseline.    At the time of discharge patient was told to take all medications as prescribed, keep all follow-up appointments, and call their doctor or return to the hospital with any worsening or concerning symptoms.    Please note that this note was made using Dragon voice recognition software            Day of Discharge     \Subjective: Patient states he feels drastically better today.  He is adamant he is ready to discharge from hospital.  He agrees to follow-up with orthopedic surgery, primary care, and hematology oncology.  No other new complaints.    Vital Signs:   Temp:  [97.4 °F (36.3 °C)-98.8 °F (37.1 °C)] 97.4 °F (36.3 °C)  Heart Rate:  [66-74] 67  Resp:  [16] 16  BP: (110-128)/(62-74) 118/67     Physical Exam:  Constitutional:Awake, alert  HENT: NCAT, mucous membranes moist, neck supple  Respiratory: No cough or wheezes, normal respirations, nonlabored breathing   Cardiovascular: Pulse rate is normal, palpable radial pulses  Gastrointestinal:  soft, nontender, nondistended  Musculoskeletal: Knee pain is drastically improved, normal musculature for age, no lower extremity edema, BMI  "24  Psychiatric: Appropriate affect, cooperative, conversational  Neurologic: No slurred speech or facial droop, follows commands  Skin: No rashes or jaundice, warm      Pertinent  and/or Most Recent Results     Results from last 7 days   Lab Units 01/27/24  0533 01/26/24  0623 01/25/24  1327   WBC 10*3/mm3 11.11* 11.95* 14.25*   HEMOGLOBIN g/dL 9.3* 9.4* 10.9*   HEMATOCRIT % 27.6* 27.8* 33.6*   PLATELETS 10*3/mm3 213 222 257   SODIUM mmol/L 139 140 143   POTASSIUM mmol/L 4.4 4.5 4.4   CHLORIDE mmol/L 106 107 107   CO2 mmol/L 24.6 26.6 25.9   BUN mg/dL 11 7* 8   CREATININE mg/dL 0.76 0.74* 0.75*   GLUCOSE mg/dL 102* 100* 90   CALCIUM mg/dL 9.1 9.1 9.5     Results from last 7 days   Lab Units 01/27/24  0533 01/26/24  0623 01/25/24  1327   BILIRUBIN mg/dL 0.9 0.7 0.8   ALK PHOS U/L 81 82 101   ALT (SGPT) U/L 19 19 24   AST (SGOT) U/L 20 21 25           Invalid input(s): \"TG\", \"LDLCALC\", \"LDLREALC\"  Results from last 7 days   Lab Units 01/26/24  0623   LACTATE mmol/L 0.9       Brief Urine Lab Results  (Last result in the past 365 days)        Color   Clarity   Blood   Leuk Est   Nitrite   Protein   CREAT   Urine HCG        09/13/23 2001 Yellow   Clear   Negative   Negative   Negative   Negative                   Microbiology Results Abnormal       None            Imaging Results (All)       Procedure Component Value Units Date/Time    XR Knee 1 or 2 View Left [[id number]] Collected: 01/25/24 1239     Updated: 01/25/24 1244    Narrative:      XR KNEE 1 OR 2 VW LEFT-     INDICATIONS: Pain, no injury     TECHNIQUE: 2 VIEWS OF THE left knee     COMPARISON: None available     FINDINGS:     No acute fracture, erosion, or dislocation is identified. Minimal knee  effusion is noted. Moderate tibiofemoral joint space narrowing is seen.  Mild degenerative spurring is noted. Arterial calcifications are  present. Follow-up/further evaluation can be obtained as indications  persist.       Impression:         As described.         "   This report was finalized on 1/25/2024 12:41 PM by Dr. Kodi Gonsalez M.D on Workstation: [id number]               Results for orders placed during the hospital encounter of 01/25/24    Duplex Venous Lower Extremity - Bilateral CAR    Interpretation Summary    Normal bilateral lower extremity venous duplex scan.    Enlarged lymph node in the right groin measuring 0.4 x 1.7 cm and an enlarged lymph node in the left groin measures 2.8 x 0.6 cm      Results for orders placed during the hospital encounter of 01/25/24    Duplex Venous Lower Extremity - Bilateral CAR    Interpretation Summary    Normal bilateral lower extremity venous duplex scan.    Enlarged lymph node in the right groin measuring 0.4 x 1.7 cm and an enlarged lymph node in the left groin measures 2.8 x 0.6 cm      Results for orders placed during the hospital encounter of 12/22/22    Adult Transthoracic Echo Complete W/ Cont if Necessary Per Protocol    Interpretation Summary    Left ventricular systolic function is normal. Calculated left ventricular EF = 67.3%    Left ventricular diastolic function was normal.    Left atrial volume is mildly increased.    Estimated right ventricular systolic pressure from tricuspid regurgitation is mildly elevated (35-45 mmHg). Calculated right ventricular systolic pressure from tricuspid regurgitation is 42 mmHg.    Mild pulmonary hypertension is present.    Mild dilation of the ascending aorta is present.      Plan for Follow-up of Pending Labs/Results: Hematology clinic  Pending Labs       Order Current Status    Folate RBC In process    Hemoglobinopathy Fractionation Cascade In process    Methylmalonic Acid, Serum In process          Discharge Details        Discharge Medications        New Medications        Instructions Start Date   acetaminophen 325 MG tablet  Commonly known as: TYLENOL   650 mg, Oral, Every 6 Hours PRN             Changes to Medications        Instructions Start Date   LORazepam 0.5 MG  tablet  Commonly known as: ATIVAN  What changed: additional instructions   TAKE 1 TABLET BY MOUTH EVERY 8 HOURS AS NEEDED FOR ANXIETY      montelukast 10 MG tablet  Commonly known as: SINGULAIR  What changed: when to take this   TAKE 1 TABLET BY MOUTH AT BEDTIME      triamcinolone 0.025 % cream  Commonly known as: KENALOG  What changed:   how much to take  additional instructions   APPLY TOPICALLY TO THE AFFECTED AREA THREE TIMES DAILY             Continue These Medications        Instructions Start Date   amLODIPine 5 MG tablet  Commonly known as: NORVASC   5 mg, Oral, Daily      benzonatate 200 MG capsule  Commonly known as: TESSALON   200 mg, Oral, 3 Times Daily PRN      cetirizine 10 MG tablet  Commonly known as: zyrTEC   10 mg, Oral, Daily      famciclovir 500 MG tablet  Commonly known as: FAMVIR   TAKE 2 TABLETS BY MOUTH TWICE DAILY AS NEEDED FOR ONSET OF SYMPTOMS      loperamide 2 MG capsule  Commonly known as: IMODIUM   2 mg, Oral, 4 Times Daily PRN      multivitamin tablet tablet  Commonly known as: THERAGRAN   1 tablet, Oral, Daily      nitroglycerin 0.4 MG SL tablet  Commonly known as: NITROSTAT   0.4 mg, Sublingual, Every 5 Minutes PRN, Take no more than 3 doses in 15 minutes.      nystatin 100,000 unit/mL suspension  Commonly known as: MYCOSTATIN   SWISH AND SPIT 5 ML BY MOUTH FOUR TIMES DAILY      ondansetron 4 MG tablet  Commonly known as: Zofran   4 mg, Oral, Every 8 Hours PRN      ondansetron ODT 4 MG disintegrating tablet  Commonly known as: ZOFRAN-ODT   4 mg, Translingual, Every 4 Hours      pantoprazole 40 MG EC tablet  Commonly known as: PROTONIX   40 mg, Oral, Daily      sertraline 25 MG tablet  Commonly known as: ZOLOFT   25 mg, Oral, Daily      tamsulosin 0.4 MG capsule 24 hr capsule  Commonly known as: FLOMAX   Take 1 capsule by mouth 2 (Two) Times a Day.      VITAMIN C PO   1 dose, Oral, Daily             Stop These Medications      azithromycin 250 MG tablet  Commonly known as: Zithromax  Z-Alberto              Allergies   Allergen Reactions    Levofloxacin Swelling    Penicillins Swelling    Oxycodone-Acetaminophen GI Intolerance    Sulfamethoxazole-Trimethoprim Rash         Discharge Disposition:  Home or Self Care    Diet:  Hospital:  Diet Order   Procedures    Diet: Cardiac Diets; Healthy Heart (2-3 Na+); Texture: Regular Texture (IDDSI 7); Fluid Consistency: Thin (IDDSI 0)       Activity:  Activity Instructions       Activity as Tolerated                   CODE STATUS:    Code Status and Medical Interventions:   Ordered at: 01/25/24 1722     Code Status (Patient has no pulse and is not breathing):    CPR (Attempt to Resuscitate)     Medical Interventions (Patient has pulse or is breathing):    Full Support       Future Appointments   Date Time Provider Department Center   3/11/2024  1:30 PM Paty Hunt APRN [facility] CD [facility]   4/25/2024  1:00 PM Enrrique Moreno APRN [facility] DRP [facility]   5/8/2024 12:00 PM [facility] CT 3 BH [facility] CT [facility]   5/15/2024  2:00 PM Felix Camacho MD PhD [facility] TS [facility]   6/17/2024  2:30 PM Henny Patrick MD [facility] PC DR PK [facility]       Additional Instructions for the Follow-ups that You Need to Schedule       Discharge Follow-up with PCP   As directed       Currently Documented PCP:    Henny Patrick MD    PCP Phone Number:    [phone]     Follow Up Details: 1 week call Monday               Follow-up Information       Louis Gimenez MD Follow up in 1 month(s).    Specialty: Orthopedic Surgery  Why: left knee pain  Contact information:  [address]  [facility] [address]  [place] [address]  [phone]               Maria Guadalupe Wolfe MD Follow up in 1 month(s).    Specialties: Hematology and Oncology, Oncology, Hematology  Why: sickle cell electrophoresis results  Contact information:  [address]  [facility] [address]  [place] [id number] [phone]                                 Parker iHll MD  01/27/24      Time Spent on Discharge:  I spent greater than 35  minutes on this discharge activity which included: face-to-face encounter with the patient, reviewing the data in the system, coordination of the care with the nursing staff as well as consultants, documentation, and entering orders.

## 2024-01-27 NOTE — OUTREACH NOTE
Prep Survey      Flowsheet Row Responses   [facility] patient discharged from? [place]   Is LACE score < 7 ? No   Eligibility [facility]   Date of Admission 01/25/24   Date of Discharge 01/27/24   Discharge Disposition Home or Self Care   Discharge diagnosis Sickle cell disease with crisis   Does the patient have one of the following disease processes/diagnoses(primary or secondary)? Other   Does the patient have Home health ordered? No   Is there a DME ordered? No   Prep survey completed? Yes            Kylie CULLEN - Registered Nurse

## 2024-01-27 NOTE — PLAN OF CARE
Problem: Adult Inpatient Plan of Care  Goal: Plan of Care Review  Outcome: Ongoing, Progressing  Flowsheets (Taken 1/27/2024 8518)  Progress: improving  Plan of Care Reviewed With: patient  Outcome Evaluation: Patient is alert and oriented. PRN Norco given x 2 this sift for pain. IVF infusing. VSS. Continue with plan of care.   Goal Outcome Evaluation:  Plan of Care Reviewed With: patient        Progress: improving  Outcome Evaluation: Patient is alert and oriented. PRN Norco given x 2 this sift for pain. IVF infusing. VSS. Continue with plan of care.

## 2024-01-27 NOTE — PROGRESS NOTES
I came to see the patient this morning for consultation he was walking around the room fully dressed he states he has no knee pain and had over the course of yesterday evening and tonight completely gone away.  Given his resolution of pain I did not perform a consult but explained to the patient should he have any return or progression of pain that he should call my office and I would be happy to see him in the office.  He was given my office number.  Electronically signed by Louis Gimenez MD, 01/27/24, 8:32 AM EST.

## 2024-01-28 LAB
FOLATE BLD-MCNC: 424 NG/ML
FOLATE RBC-MCNC: 1531 NG/ML
HCT VFR BLD AUTO: 27.7 % (ref 37.5–51)

## 2024-01-28 NOTE — CASE MANAGEMENT/SOCIAL WORK
Case Management Discharge Note      Final Note: dc home    Provided Post Acute Provider List?: N/A  Provided Post Acute Provider Quality & Resource List?: N/A  N/A Quality & Resource List Comment: pt going home    Selected Continued Care - Discharged on 1/27/2024 Admission date: 1/25/2024 - Discharge disposition: Home or Self Care      Destination    No services have been selected for the patient.                Durable Medical Equipment    No services have been selected for the patient.                Dialysis/Infusion    No services have been selected for the patient.                Home Medical Care    No services have been selected for the patient.                Therapy    No services have been selected for the patient.                Community Resources    No services have been selected for the patient.                Community & DME    No services have been selected for the patient.                         Final Discharge Disposition Code: 01 - home or self-care

## 2024-01-29 ENCOUNTER — TRANSITIONAL CARE MANAGEMENT TELEPHONE ENCOUNTER (OUTPATIENT)
Dept: CALL CENTER | Facility: HOSPITAL | Age: 64
End: 2024-01-29
Payer: MEDICARE

## 2024-01-29 NOTE — OUTREACH NOTE
Call Center TCM Note      Flowsheet Row Responses   [facility] patient discharged from? [place]   Does the patient have one of the following disease processes/diagnoses(primary or secondary)? Other   TCM attempt successful? Yes   Call start time 1401   Call end time 1414   Discharge diagnosis Sickle cell disease with crisis   Meds reviewed with patient/caregiver? Yes   Is the patient having any side effects they believe may be caused by any medication additions or changes? No   Does the patient have all medications ordered at discharge? Yes   Is the patient taking all medications as directed (includes completed medication regime)? Yes   Comments TCM APPT WITH PCP DR JESUS CASTRO IS 02/02/2024   Does the patient have an appointment with their PCP within 7-14 days of discharge? Yes   Has home health visited the patient within 72 hours of discharge? N/A   Psychosocial issues? No   Did the patient receive a copy of their discharge instructions? Yes   What is the patient's perception of their health status since discharge? Improving   Is the patient/caregiver able to teach back signs and symptoms related to disease process for when to call PCP? Yes   Is the patient/caregiver able to teach back signs and symptoms related to disease process for when to call 911? Yes   Is the patient/caregiver able to teach back the hierarchy of who to call/visit for symptoms/problems? PCP, Specialist, Home health nurse, Urgent Care, ED, 911 Yes   If the patient is a current smoker, are they able to teach back resources for cessation? Not a smoker   TCM call completed? Yes   Wrap up additional comments D/C DX: Sickle cell disease with crisis - Pt states he is feeling much better. No questions at this time. No changes to pt current medications at d/c. TCM APPT with PCP Dr Jesus Castro is 02/02/2024.   Call end time 1414            Leilani Richter MA    1/29/2024, 14:31 EST

## 2024-02-01 LAB
HGB A MFR BLD ELPH: NORMAL % (ref 96.4–98.8)
HGB A MFR BLD HPLC: 0 % (ref 96.4–98.8)
HGB A2 MFR BLD ELPH: NORMAL % (ref 1.8–3.2)
HGB A2 MFR BLD HPLC: 3.1 % (ref 1.8–3.2)
HGB C MFR BLD HPLC: 46.8 %
HGB E MFR BLD HPLC: 0 %
HGB F MFR BLD ELPH: NORMAL % (ref 0–2)
HGB F MFR BLD HPLC: 0.7 % (ref 0–2)
HGB FRACT BLD-IMP: ABNORMAL
HGB FRACT BLD-IMP: NORMAL
HGB S BLD QL SOLY: POSITIVE
HGB S MFR BLD ELPH: NORMAL %
HGB S MFR BLD HPLC: 49.4 %

## 2024-02-02 ENCOUNTER — OFFICE VISIT (OUTPATIENT)
Dept: FAMILY MEDICINE CLINIC | Facility: CLINIC | Age: 64
End: 2024-02-02
Payer: MEDICARE

## 2024-02-02 VITALS
HEIGHT: 69 IN | RESPIRATION RATE: 18 BRPM | WEIGHT: 167 LBS | OXYGEN SATURATION: 98 % | BODY MASS INDEX: 24.73 KG/M2 | HEART RATE: 62 BPM | DIASTOLIC BLOOD PRESSURE: 62 MMHG | SYSTOLIC BLOOD PRESSURE: 120 MMHG

## 2024-02-02 DIAGNOSIS — J30.9 ALLERGIC RHINITIS, UNSPECIFIED SEASONALITY, UNSPECIFIED TRIGGER: ICD-10-CM

## 2024-02-02 DIAGNOSIS — D57.1 SICKLING DISORDER DUE TO HEMOGLOBIN S WITHOUT CRISIS: Primary | ICD-10-CM

## 2024-02-02 DIAGNOSIS — L72.9 FOLLICULAR CYST OF SKIN: ICD-10-CM

## 2024-02-02 LAB — METHYLMALONATE SERPL-SCNC: 140 NMOL/L (ref 0–378)

## 2024-02-02 NOTE — PROGRESS NOTES
"Henrry Glover is a 63 y.o. male admitted to [facility] and  is seen for follow up of sickle cell crisis.     Admission date 1/25 D/C date 1/27  Discharge summary is available.  Risk for Readmission (LACE) Score: 8 (1/27/2024  6:01 AM)  Current outpatient and discharge medications have been reconciled for the patient.  Reviewed by: Henny Patrick MD  He was admitted for the following reason(s):  Primary admitting diagnosis at discharge was leukocytosis, knee pain, sickle cell crisis.  Pertinent secondary diagnoses include none.  Course During Hospital Stay:  Fluid resuscitation, pain control.   Procedures Performed in Hospital : IV fluids and electrophoresis  Pending tests : none HbC and S  Pain Control:  no pain  Diet: regular diet  Activity after Discharge: activity as tolerated    Items to be addressed at first follow up visit include:  1. Medication changes: none  2.  Also a URI which is persistent    He reports his overall condition are improving since discharge.  No specific complaints.  Social support is said to be adequate to meet his needs. .     Objective   Vitals:    02/02/24 1038   BP: 120/62   Pulse: 62   Resp: 18   SpO2: 98%   Weight: 75.8 kg (167 lb)   Height: 175.3 cm (69\")     Body mass index is 24.66 kg/m².    Physical Exam  Vitals reviewed.   Constitutional:       Appearance: Normal appearance.   Cardiovascular:      Rate and Rhythm: Normal rate and regular rhythm.      Heart sounds: No murmur heard.  Pulmonary:      Effort: Pulmonary effort is normal.      Breath sounds: Normal breath sounds. No wheezing.   Lymphadenopathy:      Comments: Has a tiny follicular cyst very mobile, soft and about 3 mm or less   Neurological:      Mental Status: He is alert and oriented to person, place, and time.   Psychiatric:         Mood and Affect: Mood normal.         Behavior: Behavior normal.         Thought Content: Thought content normal.         Judgment: Judgment normal.          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  Complexity of MDM is MODERATE  A Face to Face visit occurred within within 14 days

## 2024-02-22 DIAGNOSIS — F41.9 ANXIETY: ICD-10-CM

## 2024-02-22 RX ORDER — LORAZEPAM 0.5 MG/1
TABLET ORAL
Qty: 25 TABLET | Refills: 0 | Status: SHIPPED | OUTPATIENT
Start: 2024-02-22

## 2024-03-04 ENCOUNTER — HOSPITAL ENCOUNTER (OUTPATIENT)
Dept: SLEEP MEDICINE | Facility: HOSPITAL | Age: 64
Discharge: HOME | End: 2024-03-04
Admitting: NURSE PRACTITIONER
Payer: MEDICARE

## 2024-03-04 DIAGNOSIS — G47.19 EXCESSIVE DAYTIME SLEEPINESS: ICD-10-CM

## 2024-03-04 DIAGNOSIS — G47.33 OBSTRUCTIVE SLEEP APNEA, ADULT: ICD-10-CM

## 2024-03-04 PROCEDURE — 95806 SLEEP STUDY UNATT&RESP EFFT: CPT

## 2024-03-11 ENCOUNTER — OFFICE VISIT (OUTPATIENT)
Dept: CARDIOLOGY | Facility: CLINIC | Age: 64
End: 2024-03-11
Payer: MEDICARE

## 2024-03-11 VITALS
BODY MASS INDEX: 25.62 KG/M2 | WEIGHT: 173 LBS | DIASTOLIC BLOOD PRESSURE: 80 MMHG | HEIGHT: 69 IN | SYSTOLIC BLOOD PRESSURE: 138 MMHG | HEART RATE: 63 BPM

## 2024-03-11 DIAGNOSIS — I10 PRIMARY HYPERTENSION: ICD-10-CM

## 2024-03-11 DIAGNOSIS — I44.4 LAFB (LEFT ANTERIOR FASCICULAR BLOCK): Primary | ICD-10-CM

## 2024-03-11 DIAGNOSIS — Z86.711 HISTORY OF PULMONARY EMBOLUS (PE): ICD-10-CM

## 2024-03-11 DIAGNOSIS — I77.89 ASCENDING AORTA ENLARGEMENT: ICD-10-CM

## 2024-03-11 DIAGNOSIS — B19.20 HEPATITIS C VIRUS INFECTION WITHOUT HEPATIC COMA, UNSPECIFIED CHRONICITY: ICD-10-CM

## 2024-03-11 DIAGNOSIS — D57.1 SICKLING DISORDER DUE TO HEMOGLOBIN S WITHOUT CRISIS: ICD-10-CM

## 2024-03-11 PROCEDURE — 1159F MED LIST DOCD IN RCRD: CPT | Performed by: NURSE PRACTITIONER

## 2024-03-11 PROCEDURE — 1160F RVW MEDS BY RX/DR IN RCRD: CPT | Performed by: NURSE PRACTITIONER

## 2024-03-11 PROCEDURE — 3075F SYST BP GE 130 - 139MM HG: CPT | Performed by: NURSE PRACTITIONER

## 2024-03-11 PROCEDURE — 99214 OFFICE O/P EST MOD 30 MIN: CPT | Performed by: NURSE PRACTITIONER

## 2024-03-11 PROCEDURE — 3079F DIAST BP 80-89 MM HG: CPT | Performed by: NURSE PRACTITIONER

## 2024-03-11 PROCEDURE — 93000 ELECTROCARDIOGRAM COMPLETE: CPT | Performed by: NURSE PRACTITIONER

## 2024-03-11 NOTE — PROGRESS NOTES
"      [facility] CARDIOLOGY  [address] [facility] [address]  [facility] [address]  Phone: [phone]  Fax: [phone]  Patient Name: Henrry Glover  :1960  Age: 63 y.o.  Primary Cardiologist: Nalini Camara MD  Encounter Provider:  JENIFER Keane    History of Present Illness     Henrry Glover is a 63 y.o. 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  He is followed in our office by Dr. Camara for heart murmur and chest pain evaluation. I have reviewed the past medical records in preparation of today's visit.     24 Follow-up:  He is here for 2-month follow-up.  2024 he was admitted with elevated reticulocyte count and reactive leukocytosis concerning for sickle cell crisis; he was treated with IV fluids but hemoglobin remained stable.  He is also currently undergoing treatment with GI for hepatitis C.  He is feeling better now and has no complaints.  He walks about 15,000 steps daily; he walks at [facility].  He is not having any chest pain, dyspnea with exertion, orthopnea, palpitations, lightheadedness, or leg swelling; he does wear compression socks.  He is taking his medications as prescribed.    Data Review     The following data was reviewed by JENIFER Keane on 24:    Vital Signs:   /80   Pulse 63   Ht 175.3 cm (69\")   Wt 78.5 kg (173 lb)   BMI 25.55 kg/m²       Weight:  Wt Readings from Last 3 Encounters:   24 78.5 kg (173 lb)   24 75.8 kg (167 lb)   24 74.8 kg (165 lb)     Body mass index is 25.55 kg/m².    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  He has remote history of pulmonary embolus following cholecystectomy and pneumonia and about .  2021 CT " chest with contrast showed stable dilation of the ascending aorta measuring 4.5 cm.  December 2022 he was seen in the [facility] for chest pressure that radiated to his left shoulder.  He initially thought this was muscle strain after he had been at [employer].  He noticed a few days later that the chest pain got worse when he was walking up some steps.  His EKG showed left anterior fascicular bloc.  Labs were unremarkable except for an elevated troponin.  December 2022 treadmill stress test showed no evidence of ischemia; occasional PACs and PVCs were noted during stress.  Echocardiogram showed EF 67%, normal LV diastolic function, mildly increased left atrial volume, mildly elevated RVSP at 42 mmHg, and mild dilation of the ascending aorta.  December 2022 24-hour Holter monitor showed predominant rhythm to be normal sinus rhythm with average heart rate 80 bpm, rare PACs, rare PVCs, and no AV block.  November 2023 CTA chest showed no evidence of pulmonary embolism, chronic fibrotic appearing changes in the lung bases with tiny nodules in the right lung likely infectious/inflammatory, mildly dilated ascending aorta measuring 4.2 cm which is stable.  He is now following with Dr. Camacho with thoracic for the pulmonary nodules.  January 2024 bilateral lower extremity venous duplex showed no evidence of DVT and was normal except for enlarged lymph node in the right groin measuring 0.4 x 1.7 cm and a large lymph node of the left groin measuring 2.8 x 0.6 cm.    Labs:  12/22/2022:  cr 0.8, K 4.3, AST 41, otherwise unremarkable CMP, proBNP 71, D-dimer 1.07, troponin <0.010, Hgb 10.6, Plt 308  01/26/2024:  cr 0.4, K 4.5, otherwise unremarkable CMP, , uric acid 6.2, iron 56, ferritin 292, iron sat 19, Hgb 9.4,       ECG 12 Lead    Date/Time: 3/11/2024 1:49 PM  Performed by: Paty Hunt APRN    Authorized by: Paty Hunt APRN  Comparison: compared with previous ECG from 11/8/2023  Similar to  previous ECG  Rhythm: sinus rhythm  Rate: normal  BPM: 63  T inversion: V6  T flattening: aVL, V1, III and aVF  Other findings: left ventricular hypertrophy          Medications     Allergies as of 03/11/2024 - Reviewed 03/11/2024   Allergen Reaction Noted    Levofloxacin Swelling 03/22/2016    Penicillins Swelling 03/22/2016    Oxycodone-acetaminophen GI Intolerance 09/01/2023    Sulfamethoxazole-trimethoprim Rash 08/09/2019         Current Outpatient Medications:     acetaminophen (TYLENOL) 325 MG tablet, Take 2 tablets by mouth Every 6 (Six) Hours As Needed for Mild Pain., Disp: , Rfl:     amLODIPine (NORVASC) 5 MG tablet, TAKE 1 TABLET BY MOUTH DAILY, Disp: 90 tablet, Rfl: 1    Ascorbic Acid (VITAMIN C PO), Take 1 dose by mouth Daily., Disp: , Rfl:     cetirizine (zyrTEC) 10 MG tablet, Take 1 tablet by mouth Daily., Disp: , Rfl:     famciclovir (FAMVIR) 500 MG tablet, TAKE 2 TABLETS BY MOUTH TWICE DAILY AS NEEDED FOR ONSET OF SYMPTOMS, Disp: 24 tablet, Rfl: 0    montelukast (SINGULAIR) 10 MG tablet, TAKE 1 TABLET BY MOUTH AT BEDTIME (Patient taking differently: Take 1 tablet by mouth Daily.), Disp: 90 tablet, Rfl: 2    Multiple Vitamins-Minerals (MULTIVITAMIN PO), Take 1 tablet by mouth Daily., Disp: , Rfl:     nystatin (MYCOSTATIN) 100,000 unit/mL suspension, SWISH AND SPIT 5 ML BY MOUTH FOUR TIMES DAILY, Disp: 60 mL, Rfl: 1    ondansetron (Zofran) 4 MG tablet, Take 1 tablet by mouth Every 8 (Eight) Hours As Needed for Nausea or Vomiting., Disp: 30 tablet, Rfl: 0    pantoprazole (PROTONIX) 40 MG EC tablet, Take 1 tablet by mouth Daily., Disp: , Rfl:     tamsulosin (FLOMAX) 0.4 MG capsule 24 hr capsule, Take 1 capsule by mouth 2 (Two) Times a Day., Disp: , Rfl: 4    triamcinolone (KENALOG) 0.025 % cream, APPLY TOPICALLY TO THE AFFECTED AREA THREE TIMES DAILY (Patient taking differently: Apply 1 Application topically to the appropriate area as directed 3 (Three) Times a Day.), Disp: 240 g, Rfl: 0     Past  History, Review of Systems, Exam     Past Medical History:   Diagnosis Date    Acid reflux     Allergic     Anxiety     Arthritis     Benign essential HTN 2017    BPH (benign prostatic hyperplasia)     Depression     DVT (deep venous thrombosis)     Eczema     Heart murmur     Hemorrhoid     Hepatitis C     History of pneumonia     History of transfusion     Sickle cell anemia     Sinus problem     Sleep apnea     Tinnitus        Past Surgical History:   has a past surgical history that includes Cholecystectomy; Lymphadenectomy; Wrist surgery (Right); Colonoscopy (2012); Esophagogastroduodenoscopy (2005); Lipoma Excision; Hemorrhoid surgery (N/A, 2016); and Whipple Procedure (2019).     Social History     Socioeconomic History    Marital status:    Tobacco Use    Smoking status: Former     Current packs/day: 0.00     Average packs/day: 0.3 packs/day for 10.0 years (2.5 ttl pk-yrs)     Types: Cigarettes     Start date: 1971     Quit date: 1981     Years since quittin.7    Smokeless tobacco: Never   Vaping Use    Vaping status: Never Used   Substance and Sexual Activity    Alcohol use: No    Drug use: No    Sexual activity: Defer       Review of Systems   Cardiovascular:  Negative for chest pain, claudication, cyanosis, dyspnea on exertion, irregular heartbeat, leg swelling, near-syncope, orthopnea, palpitations, paroxysmal nocturnal dyspnea and syncope.       Vitals reviewed.   Constitutional:       Appearance: Not in distress.   Eyes:      Conjunctiva/sclera: Conjunctivae normal.      Pupils: Pupils are equal, round, and reactive to light.   HENT:      Head: Normocephalic.      Nose: Nose normal.    Mouth/Throat:      Pharynx: Oropharynx is clear.   Neck:      Vascular: JVD normal.   Pulmonary:      Effort: Pulmonary effort is normal.      Breath sounds: Normal breath sounds. No wheezing. No rhonchi. No rales.   Cardiovascular:      Normal rate. Regular rhythm. Normal S1.  Normal S2.       Murmurs: There is a grade 2/6 systolic murmur.   Pulses:     Intact distal pulses.   Edema:     Peripheral edema absent.   Abdominal:      General: Bowel sounds are normal. There is no distension.      Palpations: Abdomen is soft.      Tenderness: There is no abdominal tenderness.   Musculoskeletal: Normal range of motion.      Cervical back: Normal range of motion and neck supple. Skin:     General: Skin is warm and dry.   Neurological:      Mental Status: Alert and oriented to person, place and time.   Psychiatric:         Attention and Perception: Attention normal.         Mood and Affect: Mood normal.         Speech: Speech normal.         Behavior: Behavior is cooperative.          Assessment and Plan     Assessment:  1. LAFB (left anterior fascicular block)    2. History of pulmonary embolus (PE)    3. 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 Primary hypertension    5. Ascending aorta enlargement    6. Hepatitis C virus infection without hepatic coma, unspecified chronicity           Chest pain: He presented to the [facility] in December 2022 with chest pain with exertion.  Left anterior fascicular block was noted on his EKG.  He had a normal treadmill stress test.  He has had no further episodes of chest pain.  Left anterior fascicular block: Nonischemic treadmill stress study in December 2022.  No LAFB noted on EKG today.  History of PE: This occurred following cholecystectomy.  His D-dimer was positive in December 2022.  He is not on anticoagulant due to his sickle cell anemia  Sickle cell anemia: Hospitalized with sickle cell crisis in January 2024 but improved with IV fluids; his hemoglobin remained stable.  Hypertension: Remains controlled on 5 mg amlodipine daily.  PACs/PVCs: These were noted during stress of treadmill stress test.  Only noted to occur rarely on December 2022 Holter monitor.  He denies any recent palpitations.    Ascending aorta: This was graded as mild on  December 2022 echocardiogram.  CT chest with IV contrast in July 2021 showed the ascending aorta to be stable at 4.5 cm. Ascending aorta measured 4.2 cm on November 2023 CTA chest.  Pulmonary hypertension: RSVP was 42 mmHg on December 2022 echocardiogram.  He is compensated by exam today.  His blood pressure is controlled.  Heart murmur: Noted on exam again today.  No significant valvular disease noted on December 2022 echocardiogram.    Mr. Glover is a patient evaluated by Dr. Camara in December 2022 for chest pain; treadmill stress test was normal and echocardiogram showed mild pulmonary hypertension.  His D-dimer was elevated but he has history of previous PE.  His chest pain has resolved and his breathing is comfortable.  Ascending aorta size was stable on November 2023 echocardiogram.  I will make no medication changes today.  I will have him see Dr. Camara in 1 year.    Return in about 1 year (around 3/11/2025) for Follow-up, Dr. Camara.  Orders Placed This Encounter   Procedures    ECG 12 Lead      No orders of the defined types were placed in this encounter.        Thank you for the opportunity to participate in this patient's care.    JENIFER Bassett    This office note has been dictated.

## 2024-03-14 DIAGNOSIS — G47.33 OSA (OBSTRUCTIVE SLEEP APNEA): Primary | ICD-10-CM

## 2024-03-14 DIAGNOSIS — R06.83 SNORING: ICD-10-CM

## 2024-03-18 ENCOUNTER — TELEPHONE (OUTPATIENT)
Dept: SLEEP MEDICINE | Facility: HOSPITAL | Age: 64
End: 2024-03-18
Payer: MEDICARE

## 2024-03-23 DIAGNOSIS — I10 BENIGN ESSENTIAL HTN: ICD-10-CM

## 2024-03-25 RX ORDER — AMLODIPINE BESYLATE 5 MG/1
5 TABLET ORAL DAILY
Qty: 90 TABLET | Refills: 1 | Status: SHIPPED | OUTPATIENT
Start: 2024-03-25

## 2024-03-26 DIAGNOSIS — F41.9 ANXIETY: ICD-10-CM

## 2024-03-26 RX ORDER — SERTRALINE HYDROCHLORIDE 25 MG/1
25 TABLET, FILM COATED ORAL DAILY
Qty: 30 TABLET | Refills: 1 | OUTPATIENT
Start: 2024-03-26

## 2024-04-02 ENCOUNTER — TELEPHONE (OUTPATIENT)
Dept: SLEEP MEDICINE | Facility: HOSPITAL | Age: 64
End: 2024-04-02
Payer: MEDICARE

## 2024-04-04 ENCOUNTER — HOSPITAL ENCOUNTER (EMERGENCY)
Facility: HOSPITAL | Age: 64
Discharge: HOME | End: 2024-04-05
Attending: EMERGENCY MEDICINE
Payer: MEDICARE

## 2024-04-04 DIAGNOSIS — R07.89 NON-CARDIAC CHEST PAIN: ICD-10-CM

## 2024-04-04 DIAGNOSIS — H11.31 SUBCONJUNCTIVAL HEMATOMA, RIGHT: Primary | ICD-10-CM

## 2024-04-04 PROCEDURE — 99284 EMERGENCY DEPT VISIT MOD MDM: CPT

## 2024-04-05 ENCOUNTER — APPOINTMENT (OUTPATIENT)
Dept: GENERAL RADIOLOGY | Facility: HOSPITAL | Age: 64
End: 2024-04-05
Payer: MEDICARE

## 2024-04-05 VITALS
RESPIRATION RATE: 16 BRPM | HEART RATE: 65 BPM | WEIGHT: 170 LBS | TEMPERATURE: 98.2 F | BODY MASS INDEX: 24.34 KG/M2 | DIASTOLIC BLOOD PRESSURE: 75 MMHG | OXYGEN SATURATION: 100 % | HEIGHT: 70 IN | SYSTOLIC BLOOD PRESSURE: 145 MMHG

## 2024-04-05 LAB
ALBUMIN SERPL-MCNC: 4.4 G/DL (ref 3.5–5.2)
ALBUMIN/GLOB SERPL: 1.5 G/DL
ALP SERPL-CCNC: 85 U/L (ref 39–117)
ALT SERPL W P-5'-P-CCNC: 22 U/L (ref 1–41)
ANION GAP SERPL CALCULATED.3IONS-SCNC: 8.5 MMOL/L (ref 5–15)
AST SERPL-CCNC: 43 U/L (ref 1–40)
BASOPHILS # BLD AUTO: 0.1 10*3/MM3 (ref 0–0.2)
BASOPHILS NFR BLD AUTO: 0.9 % (ref 0–1.5)
BILIRUB SERPL-MCNC: 1.1 MG/DL (ref 0–1.2)
BUN SERPL-MCNC: 12 MG/DL (ref 8–23)
BUN/CREAT SERPL: 15 (ref 7–25)
CALCIUM SPEC-SCNC: 9.9 MG/DL (ref 8.6–10.5)
CHLORIDE SERPL-SCNC: 105 MMOL/L (ref 98–107)
CO2 SERPL-SCNC: 24.5 MMOL/L (ref 22–29)
CREAT SERPL-MCNC: 0.8 MG/DL (ref 0.76–1.27)
DEPRECATED RDW RBC AUTO: 43.6 FL (ref 37–54)
EGFRCR SERPLBLD CKD-EPI 2021: 97.6 ML/MIN/1.73
EOSINOPHIL # BLD AUTO: 0.35 10*3/MM3 (ref 0–0.4)
EOSINOPHIL NFR BLD AUTO: 3.1 % (ref 0.3–6.2)
ERYTHROCYTE [DISTWIDTH] IN BLOOD BY AUTOMATED COUNT: 14 % (ref 12.3–15.4)
GLOBULIN UR ELPH-MCNC: 3 GM/DL
GLUCOSE SERPL-MCNC: 99 MG/DL (ref 65–99)
HCT VFR BLD AUTO: 29.1 % (ref 37.5–51)
HGB BLD-MCNC: 10.7 G/DL (ref 13–17.7)
IMM GRANULOCYTES # BLD AUTO: 0.04 10*3/MM3 (ref 0–0.05)
IMM GRANULOCYTES NFR BLD AUTO: 0.4 % (ref 0–0.5)
LYMPHOCYTES # BLD AUTO: 3.35 10*3/MM3 (ref 0.7–3.1)
LYMPHOCYTES NFR BLD AUTO: 29.7 % (ref 19.6–45.3)
MCH RBC QN AUTO: 31.6 PG (ref 26.6–33)
MCHC RBC AUTO-ENTMCNC: 36.8 G/DL (ref 31.5–35.7)
MCV RBC AUTO: 85.8 FL (ref 79–97)
MONOCYTES # BLD AUTO: 1.18 10*3/MM3 (ref 0.1–0.9)
MONOCYTES NFR BLD AUTO: 10.5 % (ref 5–12)
NEUTROPHILS NFR BLD AUTO: 55.4 % (ref 42.7–76)
NEUTROPHILS NFR BLD AUTO: 6.27 10*3/MM3 (ref 1.7–7)
PLATELET # BLD AUTO: 232 10*3/MM3 (ref 140–450)
PMV BLD AUTO: 11.7 FL (ref 6–12)
POTASSIUM SERPL-SCNC: 4 MMOL/L (ref 3.5–5.2)
PROT SERPL-MCNC: 7.4 G/DL (ref 6–8.5)
QT INTERVAL: 467 MS
QTC INTERVAL: 459 MS
RBC # BLD AUTO: 3.39 10*6/MM3 (ref 4.14–5.8)
SODIUM SERPL-SCNC: 138 MMOL/L (ref 136–145)
TROPONIN T SERPL HS-MCNC: 7 NG/L
WBC NRBC COR # BLD AUTO: 11.29 10*3/MM3 (ref 3.4–10.8)

## 2024-04-05 PROCEDURE — 84484 ASSAY OF TROPONIN QUANT: CPT | Performed by: EMERGENCY MEDICINE

## 2024-04-05 PROCEDURE — 71046 X-RAY EXAM CHEST 2 VIEWS: CPT

## 2024-04-05 PROCEDURE — 80053 COMPREHEN METABOLIC PANEL: CPT | Performed by: EMERGENCY MEDICINE

## 2024-04-05 PROCEDURE — 85025 COMPLETE CBC W/AUTO DIFF WBC: CPT | Performed by: EMERGENCY MEDICINE

## 2024-04-05 PROCEDURE — 93010 ELECTROCARDIOGRAM REPORT: CPT | Performed by: INTERNAL MEDICINE

## 2024-04-05 PROCEDURE — 99283 EMERGENCY DEPT VISIT LOW MDM: CPT | Performed by: EMERGENCY MEDICINE

## 2024-04-05 PROCEDURE — 93005 ELECTROCARDIOGRAM TRACING: CPT | Performed by: EMERGENCY MEDICINE

## 2024-04-05 NOTE — FSED PROVIDER NOTE
[facility]  eMERGENCY dEPARTMENT eNCOUnter      Pt Name: Henrry Glover  MRN: [id number]  Birthdate 1960  Date of evaluation: 4/5/2024  Provider: Zuhair Fuentes MD    CHIEF COMPLAINT       Chief Complaint   Patient presents with    Eye Pain       Nurses Notes reviewed and I agree except as noted in the HPI.      HISTORY OF PRESENT ILLNESS       History provided by:  Patient        Henrry Glover is a 64 y.o. male who presents to the emergency department for right eye irritation.  He noticed around 2200 as he was making his way to the shower, he felt like there was something in his eye.  He had been sleeping on the couch and when he woke he noticed the irritation.  Pt looked int he mirror that there was a redness to his eye.  He is not having any pain, but describes it as irritation in the medial aspect of the eye.  He also goes on to report some chest discomfort.  He finds that when he bends over he has the discomfort.  He finds that it hurts when he takes a deep breath.  This started yesterday around 1700.  He had just finished eat and had gotten up from his desk.  There is no radiation of the pain and there is no exertional component to it.        REVIEW OF SYSTEMS       Review of Systems   Constitutional:  Negative for fatigue and fever.   HENT:  Negative for congestion.    Eyes:  Positive for itching. Negative for photophobia, pain and visual disturbance.   Respiratory:  Negative for cough and shortness of breath.    Cardiovascular:  Positive for chest pain. Negative for palpitations.   Gastrointestinal:  Negative for abdominal pain, nausea and vomiting.         PAST MEDICAL HISTORY     Past Medical History:   Diagnosis Date    Acid reflux     Allergic     Anxiety     Arthritis     Benign essential HTN 04/21/2017    BPH (benign prostatic hyperplasia)     Depression     DVT (deep venous thrombosis)     Eczema     Heart murmur     Hemorrhoid     Hepatitis C     history cleared     History of pneumonia     History of transfusion     Sickle cell anemia     Sinus problem     Sleep apnea     cpap    Tinnitus          SURGICAL HISTORY        has a past surgical history that includes Cholecystectomy; Lymphadenectomy; Wrist surgery (Right); Colonoscopy (2012); Esophagogastroduodenoscopy (02/22/2005); Lipoma Excision; Hemorrhoid surgery (N/A, 6/23/2016); and Whipple Procedure (07/26/2019).      CURRENT MEDICATIONS          Medication List        CHANGE how you take these medications      montelukast 10 MG tablet  Commonly known as: SINGULAIR  TAKE 1 TABLET BY MOUTH AT BEDTIME  What changed: when to take this     triamcinolone 0.025 % cream  Commonly known as: KENALOG  APPLY TOPICALLY TO THE AFFECTED AREA THREE TIMES DAILY  What changed:   how much to take  additional instructions            CONTINUE taking these medications      acetaminophen 325 MG tablet  Commonly known as: TYLENOL  Take 2 tablets by mouth Every 6 (Six) Hours As Needed for Mild Pain.     amLODIPine 5 MG tablet  Commonly known as: NORVASC  TAKE 1 TABLET BY MOUTH DAILY     cetirizine 10 MG tablet  Commonly known as: zyrTEC     famciclovir 500 MG tablet  Commonly known as: FAMVIR  TAKE 2 TABLETS BY MOUTH TWICE DAILY AS NEEDED FOR ONSET OF SYMPTOMS     multivitamin tablet tablet  Commonly known as: THERAGRAN     nystatin 100,000 unit/mL suspension  Commonly known as: MYCOSTATIN  SWISH AND SPIT 5 ML BY MOUTH FOUR TIMES DAILY     ondansetron 4 MG tablet  Commonly known as: Zofran  Take 1 tablet by mouth Every 8 (Eight) Hours As Needed for Nausea or Vomiting.     pantoprazole 40 MG EC tablet  Commonly known as: PROTONIX     tamsulosin 0.4 MG capsule 24 hr capsule  Commonly known as: FLOMAX     VITAMIN C PO                ALLERGIES       is allergic to levofloxacin, penicillins, oxycodone-acetaminophen, and sulfamethoxazole-trimethoprim.      FAMILY HISTORY       He indicated that the status of his mother is unknown. He indicated that  "the status of his maternal uncle is unknown.   family history includes Arthritis in his mother; Heart attack in his maternal uncle.    SOCIAL HISTORY        reports that he quit smoking about 42 years ago. His smoking use included cigarettes. He started smoking about 52 years ago. He has a 2.5 pack-year smoking history. He has never used smokeless tobacco. He reports that he does not drink alcohol and does not use drugs.    PHYSICAL EXAM       INITIAL VITALS:  height is 177.8 cm (70\") and weight is 77.1 kg (170 lb). His oral temperature is 98.2 °F (36.8 °C). His blood pressure is 147/84 and his pulse is 65. His respiration is 20 and oxygen saturation is 98%.      Physical Exam  Vitals and nursing note reviewed.   Constitutional:       Appearance: Normal appearance. He is not ill-appearing.   HENT:      Head: Normocephalic and atraumatic.      Nose: Nose normal.      Mouth/Throat:      Mouth: Mucous membranes are moist.      Pharynx: Oropharynx is clear. No oropharyngeal exudate or posterior oropharyngeal erythema.   Eyes:      Extraocular Movements: Extraocular movements intact.      Conjunctiva/sclera:      Right eye: Hemorrhage present.      Pupils: Pupils are equal, round, and reactive to light.     Cardiovascular:      Rate and Rhythm: Normal rate and regular rhythm.      Heart sounds: No murmur heard.  Pulmonary:      Effort: Pulmonary effort is normal.      Breath sounds: Normal breath sounds. No wheezing, rhonchi or rales.   Abdominal:      General: Abdomen is flat.      Palpations: Abdomen is soft.      Tenderness: There is no abdominal tenderness. There is no guarding.   Musculoskeletal:         General: No swelling. Normal range of motion.      Cervical back: Normal range of motion and neck supple. No tenderness.   Skin:     General: Skin is warm and dry.      Capillary Refill: Capillary refill takes less than 2 seconds.   Neurological:      General: No focal deficit present.      Mental Status: He is " alert and oriented to person, place, and time.      Cranial Nerves: No cranial nerve deficit.   Psychiatric:         Mood and Affect: Mood normal.         Behavior: Behavior normal.         Thought Content: Thought content normal.           DIAGNOSTIC RESULTS     EKG: All EKG's are interpreted by the Emergency Department Physician who either signs or Co-signs this chart in the absence of a cardiologist.    NSR with a rate of 58, prolonged DC at 227, normal QRS and QTc intervals.  Left axis deviation, no acute ST elevations or depressions noted.    RADIOLOGY: non-plain film images(s) such as CT, Ultrasound and MRI are read by the radiologist.  Plain radiographic images are visualized and preliminarily interpreted by the emergency physician unless otherwise stated below.      XR Chest 2 View   Final Result   No acute findings.       This report was finalized on 4/5/2024 12:42 AM by Dr. Deb Nino M.D on Workstation: [id number]                 LABS:   Lab Results (last 24 hours)       Procedure Component Value Units Date/Time    CBC & Differential [[id number]]  (Abnormal) Collected: 04/05/24 0047    Specimen: Blood Updated: 04/05/24 0050    Narrative:      The following orders were created for panel order CBC & Differential.  Procedure                               Abnormality         Status                     ---------                               -----------         ------                     CBC Auto Differential[[id number]]        Abnormal            Final result                 Please view results for these tests on the individual orders.    Comprehensive Metabolic Panel [[id number]]  (Abnormal) Collected: 04/05/24 0047    Specimen: Blood Updated: 04/05/24 0110     Glucose 99 mg/dL      BUN 12 mg/dL      Creatinine 0.80 mg/dL      Sodium 138 mmol/L      Potassium 4.0 mmol/L      Comment: Slight hemolysis detected by analyzer. Result may be falsely elevated.        Chloride 105 mmol/L      CO2 24.5 mmol/L       Calcium 9.9 mg/dL      Total Protein 7.4 g/dL      Albumin 4.4 g/dL      ALT (SGPT) 22 U/L      Comment: Specimen hemolyzed.  Result may  be falsely elevated.        AST (SGOT) 43 U/L      Comment: Slight hemolysis detected by analyzer. Result may be falsely elevated.        Alkaline Phosphatase 85 U/L      Total Bilirubin 1.1 mg/dL      Globulin 3.0 gm/dL      A/G Ratio 1.5 g/dL      BUN/Creatinine Ratio 15.0     Anion Gap 8.5 mmol/L      eGFR 97.6 mL/min/1.73     Narrative:      GFR Normal >60  Chronic Kidney Disease <60  Kidney Failure <15      High Sensitivity Troponin T [[id number]]  (Normal) Collected: 04/05/24 0047    Specimen: Blood Updated: 04/05/24 0110     HS Troponin T 7 ng/L     Narrative:      High Sensitive Troponin T Reference Range:  <14.0 ng/L- Negative Female for AMI  <22.0 ng/L- Negative Male for AMI  >=14 - Abnormal Female indicating possible myocardial injury.  >=22 - Abnormal Male indicating possible myocardial injury.   Clinicians would have to utilize clinical acumen, EKG, Troponin, and serial changes to determine if it is an Acute Myocardial Infarction or myocardial injury due to an underlying chronic condition.         CBC Auto Differential [[id number]]  (Abnormal) Collected: 04/05/24 0047    Specimen: Blood Updated: 04/05/24 0050     WBC 11.29 10*3/mm3      RBC 3.39 10*6/mm3      Hemoglobin 10.7 g/dL      Hematocrit 29.1 %      MCV 85.8 fL      MCH 31.6 pg      MCHC 36.8 g/dL      RDW 14.0 %      RDW-SD 43.6 fl      MPV 11.7 fL      Platelets 232 10*3/mm3      Neutrophil % 55.4 %      Lymphocyte % 29.7 %      Monocyte % 10.5 %      Eosinophil % 3.1 %      Basophil % 0.9 %      Immature Grans % 0.4 %      Neutrophils, Absolute 6.27 10*3/mm3      Lymphocytes, Absolute 3.35 10*3/mm3      Monocytes, Absolute 1.18 10*3/mm3      Eosinophils, Absolute 0.35 10*3/mm3      Basophils, Absolute 0.10 10*3/mm3      Immature Grans, Absolute 0.04 10*3/mm3             EMERGENCY DEPARTMENT COURSE:  "  Vitals:    Vitals:    04/04/24 2312   BP: 147/84   Pulse: 65   Resp: 20   Temp: 98.2 °F (36.8 °C)   TempSrc: Oral   SpO2: 98%   Weight: 77.1 kg (170 lb)   Height: 177.8 cm (70\")       Medical Decision Making  Patient presented to the ED for a subconjunctival hemorrhage, but then began complaining of his chest discomfort.  He denies any hard coughing or vomiting or other mechanism that could cause a subconjunctival hemorrhage.  I explained to him that it is benign and will resolve on its own in a couple weeks.  As far as the chest pain is concerned.  It was not really chest pain per se.  He described it as a \"discomfort\" that sat on his chest.  It did not move.  It did not get worse with exertion.  He found that it would get worse if he bent over or if he moved his arms.  I suspect that this is going to be more of a musculoskeletal issue than actual cardiac issue.  Workup was unremarkable.  I discussed all the results with him.  I will have him follow-up with his primary care as needed.  Patient was told that he could return here to the ED if he has any further issues or new complaints.  Patient understands and he agrees with discharge planning.    Problems Addressed:  Non-cardiac chest pain: complicated acute illness or injury  Subconjunctival hematoma, right: complicated acute illness or injury    Amount and/or Complexity of Data Reviewed  Labs: ordered.  Radiology: ordered.  ECG/medicine tests: ordered.         The patient was given the following medications:  Medications - No data to display         CRITICAL CARE:  none    CONSULTS:  none    PROCEDURES:  Procedures  None    DIFFERENTIAL DIAGNOSIS:     ACS, musculoskeletal chest pain, costochondritis, corneal abrasion, subconjunctival hemorrhage, retinal artery occlusion      FINAL IMPRESSION      1. Subconjunctival hematoma, right    2. Non-cardiac chest pain          DISPOSITION/PLAN     PATIENT REFERRED TO:  Henny Patrick MD  [address] [place] [address] " [place] [address]  [phone]    Call in 3 days  As needed      DISCHARGE MEDICATIONS:     Medication List        CHANGE how you take these medications      montelukast 10 MG tablet  Commonly known as: SINGULAIR  TAKE 1 TABLET BY MOUTH AT BEDTIME  What changed: when to take this     triamcinolone 0.025 % cream  Commonly known as: KENALOG  APPLY TOPICALLY TO THE AFFECTED AREA THREE TIMES DAILY  What changed:   how much to take  additional instructions            CONTINUE taking these medications      acetaminophen 325 MG tablet  Commonly known as: TYLENOL  Take 2 tablets by mouth Every 6 (Six) Hours As Needed for Mild Pain.     amLODIPine 5 MG tablet  Commonly known as: NORVASC  TAKE 1 TABLET BY MOUTH DAILY     cetirizine 10 MG tablet  Commonly known as: zyrTEC     famciclovir 500 MG tablet  Commonly known as: FAMVIR  TAKE 2 TABLETS BY MOUTH TWICE DAILY AS NEEDED FOR ONSET OF SYMPTOMS     multivitamin tablet tablet  Commonly known as: THERAGRAN     nystatin 100,000 unit/mL suspension  Commonly known as: MYCOSTATIN  SWISH AND SPIT 5 ML BY MOUTH FOUR TIMES DAILY     ondansetron 4 MG tablet  Commonly known as: Zofran  Take 1 tablet by mouth Every 8 (Eight) Hours As Needed for Nausea or Vomiting.     pantoprazole 40 MG EC tablet  Commonly known as: PROTONIX     tamsulosin 0.4 MG capsule 24 hr capsule  Commonly known as: FLOMAX     VITAMIN C PO              (Please note that portions of this note were completed with a voice recognition program.  Efforts were made to edit the dictations but occasionally words are mis-transcribed.)    Zuhair Fuentes MD (electronically signed) Emergency Physician

## 2024-04-08 ENCOUNTER — OFFICE VISIT (OUTPATIENT)
Dept: FAMILY MEDICINE CLINIC | Facility: CLINIC | Age: 64
End: 2024-04-08
Payer: MEDICARE

## 2024-04-08 VITALS
DIASTOLIC BLOOD PRESSURE: 82 MMHG | SYSTOLIC BLOOD PRESSURE: 122 MMHG | RESPIRATION RATE: 18 BRPM | HEIGHT: 70 IN | WEIGHT: 167 LBS | OXYGEN SATURATION: 97 % | HEART RATE: 76 BPM | BODY MASS INDEX: 23.91 KG/M2

## 2024-04-08 DIAGNOSIS — R07.89 ATYPICAL CHEST PAIN: ICD-10-CM

## 2024-04-08 DIAGNOSIS — H11.31 SUBCONJUNCTIVAL HEMORRHAGE OF RIGHT EYE: Primary | ICD-10-CM

## 2024-04-08 PROCEDURE — 1159F MED LIST DOCD IN RCRD: CPT | Performed by: NURSE PRACTITIONER

## 2024-04-08 PROCEDURE — 3079F DIAST BP 80-89 MM HG: CPT | Performed by: NURSE PRACTITIONER

## 2024-04-08 PROCEDURE — 99213 OFFICE O/P EST LOW 20 MIN: CPT | Performed by: NURSE PRACTITIONER

## 2024-04-08 PROCEDURE — 3074F SYST BP LT 130 MM HG: CPT | Performed by: NURSE PRACTITIONER

## 2024-04-08 PROCEDURE — 1160F RVW MEDS BY RX/DR IN RCRD: CPT | Performed by: NURSE PRACTITIONER

## 2024-04-08 NOTE — PROGRESS NOTES
Subjective   Henrry Glover is a 64 y.o. male.     History of Present Illness   Patient presents for follow-up for subconjunctival hemorrhage, he was seen on 4/4/2024. He reports that he was sleeping on the couch and woke to the feeling that he had something in his eye. He reports that it was very red when he woke. He denies any vision changes in his eye or pain. Patient denies any injury to his eye. He is not taking any blood thinners or aspirin.     Patient also c/o chest pain while in ER. He has been checked out from a cardiac perspective and cleared. He reports that he has not had any more chest pain since this episode. He is noted to have a mild anemia. Improved since 2 months ago, when checked.     The following portions of the patient's history were reviewed and updated as appropriate: allergies, current medications, past family history, past medical history, past social history, past surgical history and problem list.    Review of Systems   Constitutional:  Negative for chills, fatigue and fever.   Respiratory:  Negative for cough, chest tightness, shortness of breath and wheezing.    Cardiovascular:  Negative for chest pain, palpitations and leg swelling.   Neurological:  Negative for dizziness and headache.   Hematological: Negative.    Psychiatric/Behavioral: Negative.  Negative for sleep disturbance.        Objective   Physical Exam  Vitals and nursing note reviewed.   Constitutional:       Appearance: He is well-developed.   HENT:      Head: Normocephalic and atraumatic.   Eyes:      General: Lids are normal.      Conjunctiva/sclera: Conjunctivae normal.      Right eye: Hemorrhage present.      Pupils: Pupils are equal, round, and reactive to light.        Comments: Right eye with subconjunctival hemorrage   Neck:      Thyroid: No thyromegaly.   Cardiovascular:      Rate and Rhythm: Normal rate and regular rhythm.      Pulses: Normal pulses.      Heart sounds: Murmur heard.      No friction rub. No  gallop.   Pulmonary:      Effort: Pulmonary effort is normal.      Breath sounds: Normal breath sounds.   Musculoskeletal:      Cervical back: Normal range of motion and neck supple.   Lymphadenopathy:      Cervical: No cervical adenopathy.   Skin:     General: Skin is warm and dry.      Capillary Refill: Capillary refill takes 2 to 3 seconds.   Neurological:      Mental Status: He is alert and oriented to person, place, and time.      Cranial Nerves: No cranial nerve deficit.   Psychiatric:         Behavior: Behavior normal.         Thought Content: Thought content normal.         Judgment: Judgment normal.         Vitals:    04/08/24 1415   BP: 122/82   Pulse: 76   Resp: 18   SpO2: 97%     Body mass index is 23.96 kg/m².      Procedures    Assessment & Plan   Problems Addressed this Visit    None  Visit Diagnoses       Subconjunctival hemorrhage of right eye    -  Primary    Atypical chest pain              Diagnoses         Codes Comments    Subconjunctival hemorrhage of right eye    -  Primary ICD-10-CM: H11.31  ICD-9-CM: 372.72     Atypical chest pain     ICD-10-CM: R07.89  ICD-9-CM: 786.59           Reviewed recent ER records  Call with any questions or concerns.          Return for Next scheduled follow up.  Answers submitted by the patient for this visit:  Primary Reason for Visit (Submitted on 4/8/2024)  What is the primary reason for your visit?: Other  Other (Submitted on 4/8/2024)  Please describe your symptoms.: Blood vessel burst in eye  Have you had these symptoms before?: No  How long have you been having these symptoms?: 1-4 days

## 2024-04-23 NOTE — PROGRESS NOTES
Subjective    PATIENT ID: Henrry Glover, :1960, MRN: [id number]    Chief Complaint   Patient presents with    Anxiety     HPI:   64 y.o. male pt presents to [facility] 2024 at the request of Henny Patrick MD. Patient arrived with new patient paperwork completed as instructed.  Patient arrived on time for appointment. Psychiatric history listed below, patient presents today with S/S of Anxiety.  PHQ/HORACIO scores reviewed with patient and are listed below.     Little to no psychiatric history reported, patient reports anxiety nearly entire life that has been exacerbated over the past several years tied to chronic health issues, cancer diagnosis on pancreas that resulted in surgery, reports he has been in remission since 2019 but every time he has to go for a CT scan or a doctor's appointment it worsens his anxiety, patient wife has been out of town for some time visiting daughter in [place] reports this on top of all his children no longer living at home or since his anxiety because he is himself at home dwelling his own thoughts, last liver enzymes reviewed with patient, has history of cirrhosis, has never been on a daily medication for anxiety educated on risk versus benefit of SSRIs.,  No clear symptoms of panic, reports very rarely he will feel tight in the chest or overwhelmed, nonmedication options discussed including therapy, patient does not appear interested, positive coping skills discussed including getting more exercise, hobbies/interest, patient reports severe allergies keep him from fishing and doing anything outdoors, in addition patient reports he has a hernia which prevents him from weight lifting but he gets a lot of steps and working part-time at [employer].    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    Primary hypertension    Retained orthopedic hardware    BPH (benign prostatic hyperplasia)    Hepatic fibrosis    Hepatitis C virus infection    Primary pancreatic neuroendocrine tumor    Hemangioma of liver    LAFB (left anterior fascicular block)    History of pulmonary embolus (PE)    Leukocytosis    Ascending aorta enlargement     PSYCHIATRIC HISTORY:    -Previous psych medications:  Ativan 0.5 mg prn    -Previous diagnoses:  Anxiety    -Previous therapy/treatment:   Medications only    -Previous psychiatric hospitalizations:   Denies    -Previous suicide attempts/self harm:   Denies    -History of trauma/abuse:   Physical: Denies  Emotional: Denies  Sexual: Denies  Traumatic Event: Cancer diagnosis and chronic health issues have been very traumatic    SOCIAL/SEXUAL/SUBSTANCE HX:    -Marital Status:   , spouses name: Sayda Michaud X 20+ years  Children: (3) ages 25, 33, 35  Currently living with wife, but she has been out of town in [place] visiting daughter  Sexually Active: defer  Sexual Partners: female  LMP: N/A    -Employment:  Part Time   Employer: [employer]  Work Hours: Varies/changes  Retired  Day to day activity: works around 5 days a week  Highest level of education: Associates    -Other:  Pets: None  Temple: Religious  Hobbies include: fish and shop (can't due to severe allergies)  Exercise: \"Not as much as I should\"    -Family psychiatry history:   None reported    SUBSTANCES:    -Alcohol:   Never    -Nicotine:   X-Smoker quit cold turkey    -Caffeine:   Never    -Other:  THC/CBD: Denies  Street Drugs: Denies  History of ETOH/Substance Abuse: Denies    FAMILY HISTORY:  family history includes Arthritis in his mother; Heart attack in his maternal uncle.     PAST MEDICAL HISTORY:   has a past " "medical history of Acid reflux, Allergic, Anxiety, Arthritis, Benign essential HTN (04/21/2017), BPH (benign prostatic hyperplasia), Depression, DVT (deep venous thrombosis), Eczema, Heart murmur, Hemorrhoid, Hepatitis C, History of pneumonia, History of transfusion, Sickle cell anemia, Sinus problem, Sleep apnea, and Tinnitus.    He has no past medical history of Hard to intubate, Malignant hyperthermia due to anesthesia, PONV (postoperative nausea and vomiting), Spinal headache, or Suicide attempt.     Review of Systems   Constitutional:  Positive for fatigue. Negative for chills and fever.   Respiratory:  Negative for chest tightness and shortness of breath.    Cardiovascular:  Negative for chest pain.   Gastrointestinal:  Positive for abdominal pain.        Hernia   Genitourinary:  Positive for frequency.        During the night, BPH   Neurological:  Negative for dizziness and light-headedness.   Psychiatric/Behavioral:  Positive for depressed mood. Negative for sleep disturbance and suicidal ideas. The patient is nervous/anxious.            Objective   Visit Vitals  /60   Pulse 60   Resp 16   Ht 177.8 cm (70\")   Wt 77.5 kg (170 lb 14.4 oz)   SpO2 97%   BMI 24.52 kg/m²     Wt Readings from Last 3 Encounters:   04/25/24 77.5 kg (170 lb 14.4 oz)   04/08/24 75.8 kg (167 lb)   04/04/24 77.1 kg (170 lb)     BP Readings from Last 3 Encounters:   04/25/24 120/60   04/08/24 122/82   04/05/24 145/75     Pulse Readings from Last 3 Encounters:   04/25/24 60   04/08/24 76   04/05/24 65      PHYSICAL EXAM:  Constitutional:       Appearance: Normal appearance.   HENT:      Head: Normocephalic.   Pulmonary:      Effort: Pulmonary effort is normal.   Skin:     General: Skin is dry.   Neurological:      Mental Status: The patient is alert and oriented to person, place, and time.      MENTAL STATUS EXAM   General Appearance:  Cleanly groomed and dressed  Eye Contact:  Good eye contact  Attitude:  Cooperative  Motor " Activity:  Normal gait, posture  Speech:  Normal rate, tone, volume  Mood and affect:  Normal, pleasant  Thought Process:  Goal-directed  Associations/ Thought Content:  No delusions  Hallucinations:  None  Suicidal Ideations:  Not present  Homicidal Ideation:  Not present  Sensorium:  Alert  Orientation:  Person, place, time and situation  Attention Span/ Concentration:  Good  Fund of Knowledge:  Appropriate for age and educational level  Intellectual Functioning:  Average range  Insight:  Fair  Judgement:  Fair  Reliability:  Fair    PHQ-9 Depression Screening  Little interest or pleasure in doing things? 1-->several days   Feeling down, depressed, or hopeless? 1-->several days   Trouble falling or staying asleep, or sleeping too much? 0-->not at all   Feeling tired or having little energy?     Poor appetite or overeating? 0-->not at all   Feeling bad about yourself/you are a failure/have let yourself or your family down? 0-->not at all   Trouble concentrating on things? 1-->several days   Psychomotor agitation/retardation 0-->not at all   Thoughts about death/dying/suicide 0-->not at all   PHQ-9 Total Score 4   How difficult have these problems for you? not difficult at all     GAD7 Documentation:  Feeling nervous, anxious or on edge 3   Not being able to stop or control worrying 2   Worrying too much about different things 2   Trouble relaxing 1   Being so restless that it is hard to sit still 0   Becoming easily annoyed or irritable 1   Feeling Afraid as if something awful might happen 3   HORACIO Total Score 12   How difficult have these problems made it for you? Somewhat difficult     LABS:  CBC          1/26/2024    06:23 1/26/2024    19:14 1/27/2024    05:33 4/5/2024    00:47   CBC   WBC 11.95   11.11  11.29    RBC 3.02   3.02  3.39    Hemoglobin 9.4   9.3  10.7    Hematocrit 27.8  27.7  27.6  29.1    MCV 92.1   91.4  85.8    MCH 31.1   30.8  31.6    MCHC 33.8   33.7  36.8    RDW 13.8   13.6  14.0    Platelets  222   213  232      CMP          1/26/2024    06:23 1/27/2024    05:33 4/5/2024    00:47   CMP   Glucose 100  102  99    BUN 7  11  12    Creatinine 0.74  0.76  0.80    EGFR 100.6  99.7  97.6    Sodium 140  139  138    Potassium 4.5  4.4  4.0    Chloride 107  106  105    Calcium 9.1  9.1  9.9    Total Protein 6.2  6.6  7.4    Albumin 3.4  3.5  4.4    Globulin 2.8  3.1  3.0    Total Bilirubin 0.7  0.9  1.1    Alkaline Phosphatase 82  81  85    AST (SGOT) 21  20  43    ALT (SGPT) 19  19  22    Albumin/Globulin Ratio 1.2  1.1  1.5    BUN/Creatinine Ratio 9.5  14.5  15.0    Anion Gap 6.4  8.4  8.5        Allergies   Allergen Reactions    Levofloxacin Swelling    Penicillins Swelling    Oxycodone-Acetaminophen GI Intolerance    Sulfamethoxazole-Trimethoprim Rash     Current Outpatient Medications   Medication Instructions    acetaminophen (TYLENOL) 650 mg, Oral, Every 6 Hours PRN    amLODIPine (NORVASC) 5 mg, Oral, Daily    Ascorbic Acid (VITAMIN C PO) 1 dose, Oral, Daily    cetirizine (ZYRTEC) 10 mg, Oral, Daily    citalopram (CeleXA) 10 MG tablet Take 0.5 tablets by mouth Daily for 7 days, THEN 1 tablet Daily for 23 days.    famciclovir (FAMVIR) 500 MG tablet TAKE 2 TABLETS BY MOUTH TWICE DAILY AS NEEDED FOR ONSET OF SYMPTOMS    LORazepam (ATIVAN) 0.5 mg, Oral, As Needed    montelukast (SINGULAIR) 10 MG tablet TAKE 1 TABLET BY MOUTH AT BEDTIME    Multiple Vitamins-Minerals (MULTIVITAMIN PO) 1 tablet, Oral, Daily    nystatin (MYCOSTATIN) 100,000 unit/mL suspension SWISH AND SPIT 5 ML BY MOUTH FOUR TIMES DAILY    ondansetron (ZOFRAN) 4 mg, Oral, Every 8 Hours PRN    pantoprazole (PROTONIX) 40 mg, Oral, Daily    tamsulosin (FLOMAX) 0.4 MG capsule 24 hr capsule Take 1 capsule by mouth 2 (Two) Times a Day.    triamcinolone (KENALOG) 0.025 % cream APPLY TOPICALLY TO THE AFFECTED AREA THREE TIMES DAILY     Assessment    ASSESSMENT/TREATMENT PLAN/INSTRUCTIONS/EDUCATION     (F41.1) HORACIO (generalized anxiety disorder) - Plan:  citalopram (CeleXA) 10 MG tablet     The above listed conditio/conditions are newly identified to this provider, consider buspar or viibryd in future, below up-to-date recommendation with liver diease.  Changes to treatment listed below  F/U 4 weeks    Dosing: Hepatic Impairment: Adult (Buspirone)   Mild to moderate impairment: There are no dosage adjustments provided in the ’s labeling; use with caution (these patients demonstrated increased plasma levels and a prolonged half-life of buspirone).  Severe impairment: Use not recommended.    Dosing: Hepatic Impairment: Adult (Vilazodone)  No dosage adjustment necessary.    Dosing: Hepatic Impairment: Adult (Citalopram)  Maximum recommended dose: 20 mg/day due to decreased clearance and the risk of QT prolongation.    AVS INSTRUCTIONS:    Medication changes:   Celexa,citalopram 1/2 tablet X 7 days then increase to 1 tablet  Ativan, continue as needed (use only for very bad days)  Please read attached handout on medications.  Patient educated they must be on psychiatric medications consistently for at least 4 to 6 weeks to get maximum benefit.    Therapy recommendation:   Exercise: Increase light/moderate exercise as tolerated, a combination of cardiovascular and strength training has been shown to be most beneficial for mental health, start with small short-term goals, 3-4 days a week, 30 minutes a day, consistency is key.  Patient educated that baseline heart rate should increase by 10-20 beats during activity, for exercise to be considered moderate intensity, patient should begin to sweat within 10 to 12 minutes.  Mindfulness-based stress reduction strategies have been shown to be effective in improving overall mental health by effectively lowering stress/anxiety levels, hand out provided for pt to review.    FOLLOW UP: Return in about 4 weeks (around 5/23/2024) for Recheck.    MEDICATION ISSUES:  KYLIE: Reviewed 04/25/2024      There are no discontinued  medications.  New Medications Ordered This Visit   Medications    citalopram (CeleXA) 10 MG tablet     Sig: Take 0.5 tablets by mouth Daily for 7 days, THEN 1 tablet Daily for 23 days.     Dispense:  27 tablet     Refill:  0      No orders of the defined types were placed in this encounter.    -Barriers: medically complex, high risk medication, and age < 25 or > 60  -Strengths:  motivated  and self-reliant    -Short-Term Goals: Pt will be compliant with medication management and note improvement in S/S over the next 4 to 6 weeks or at next scheduled visit.  -Long-Term Goals: Pt will be compliant with the agreed treatment plan including medication regimen & F/U appt's and deny impairment in daily functioning over the next 6 months.      -Progress toward goal: Not at goal  -Functional Status: Moderate impairment   -Prognosis: Fair with Ongoing Treatment        SUMMARY/DISCUSSION:  -Pt past social/medical/family history reviewed/updated. ROS conducted, medications/allergies, reviewed, patient was screened today for depression/anxiety, PHQ/HORACIO scores reviewed.  Most recent vitals/labs reviewed. Pt was given appropriate time to ask questions and concerns were addressed. A thorough discussion was had that included review of disease process, need for continued monitoring and additional treatment options including use of pharmacological and non-pharmacological approaches to care, decisions were made and agreed upon by patient and provider. Discussed the risks, benefits, and potential side effects of the medications; patient ackowledged and verbally consented.     -Please call the office at [phone] with any worsening of symptoms or onset of intolerable side effects, please ask to leave a message with Gm's medical assistant.  Please give my office up to 48 hours to respond to a patient call/question/refill request.  -Patient is agreeable to call 911 or go to the nearest ER should he/she/they have any thoughts of harm  to self or others.  -Patient has been educated on providers schedule, contact information, and patient/provider expectations.   -Patient has been educated regarding multimodal approach with healthy nutrition, healthy sleep, regular physical activity, social activities, counseling, and medications.     Part of this note may be an electronic transcription/translation of spoken language to printed text using the Dragon Dictation System. Some of the data in this electronic note has been brought forward from a previous encounter, any necessary changes have been made, it has been reviewed by this APRN, and it is accurate.    This document has been electronically signed by JENIFER Louis April 25, 2024 14:48 EDT

## 2024-04-25 ENCOUNTER — OFFICE VISIT (OUTPATIENT)
Dept: BEHAVIORAL HEALTH | Facility: CLINIC | Age: 64
End: 2024-04-25
Payer: MEDICARE

## 2024-04-25 VITALS
DIASTOLIC BLOOD PRESSURE: 60 MMHG | SYSTOLIC BLOOD PRESSURE: 120 MMHG | WEIGHT: 170.9 LBS | RESPIRATION RATE: 16 BRPM | HEIGHT: 70 IN | HEART RATE: 60 BPM | OXYGEN SATURATION: 97 % | BODY MASS INDEX: 24.47 KG/M2

## 2024-04-25 DIAGNOSIS — F41.1 GAD (GENERALIZED ANXIETY DISORDER): Primary | ICD-10-CM

## 2024-04-25 RX ORDER — CITALOPRAM HYDROBROMIDE 10 MG/1
TABLET ORAL
Qty: 27 TABLET | Refills: 0 | Status: SHIPPED | OUTPATIENT
Start: 2024-04-25 | End: 2024-05-25

## 2024-04-25 RX ORDER — LORAZEPAM 0.5 MG/1
0.5 TABLET ORAL AS NEEDED
COMMUNITY

## 2024-04-25 NOTE — PATIENT INSTRUCTIONS
Medication changes:   Celexa,citalopram 1/2 tablet X 7 days then increase to 1 tablet  Ativan, continue as needed (use only for very bad days)  Please read attached handout on medications.  Patient educated they must be on psychiatric medications consistently for at least 4 to 6 weeks to get maximum benefit.    Therapy recommendation:   Exercise: Increase light/moderate exercise as tolerated, a combination of cardiovascular and strength training has been shown to be most beneficial for mental health, start with small short-term goals, 3-4 days a week, 30 minutes a day, consistency is key.  Patient educated that baseline heart rate should increase by 10-20 beats during activity, for exercise to be considered moderate intensity, patient should begin to sweat within 10 to 12 minutes.  Mindfulness-based stress reduction strategies have been shown to be effective in improving overall mental health by effectively lowering stress/anxiety levels, hand out provided for pt to review.    GENERAL NEW PATIENT INSTRUCTIONS:    -Please arrive in person or virtually 10-15 minutes prior to appointment to allow for registration/sign in/questionnaires. If you are seen virtually please review after visit summary (AVS)/patient instructions via [facility] for a summary of plan of care/changes in treatment plan. If you are having difficulties logging on or accessing [facility] please contact my office for assistance.    -The best way to get a hold of Gm is to call the office at [phone] and ask to leave a message with his medical assistant. Gm Moreno is a Psychiatric Mental Health Nurse Practitioner, due to his specialty patient's are not able to message him directly via [facility]. Please give my office up to 48 hours to respond to a patient call/question/refill request. Refill requests will be made during normal office hours only, Monday-Friday 8:00-5:30.      -Gm is out of the office on Wednesdays and weekends. Tuesdays and  Thursdays are Gm's in-office days, Mondays and Fridays are his telehealth days.  The decision to be seen virtually or in person is up to the discretion of the provider, not all behavioral health problems are appropriate for telehealth.    -Please call the office at [phone] with any worsening of symptoms or onset of intolerable side effects.  -Follow-up appointments must be maintained in order for prescribing to continue.  -Patient has been educated regarding multimodal approach with healthy nutrition, healthy sleep, regular physical activity, social activities, counseling, and medications.   -Please call 911 or go to the nearest ER if you begin to have thoughts of harming yourself or other people.

## 2024-05-23 ENCOUNTER — HOSPITAL ENCOUNTER (OUTPATIENT)
Facility: HOSPITAL | Age: 64
Discharge: HOME | End: 2024-05-23
Admitting: STUDENT IN AN ORGANIZED HEALTH CARE EDUCATION/TRAINING PROGRAM
Payer: MEDICARE

## 2024-05-23 DIAGNOSIS — R91.1 SOLITARY PULMONARY NODULE: ICD-10-CM

## 2024-05-23 PROCEDURE — 71250 CT THORAX DX C-: CPT

## 2024-05-25 DIAGNOSIS — F41.1 GAD (GENERALIZED ANXIETY DISORDER): ICD-10-CM

## 2024-05-26 RX ORDER — CITALOPRAM HYDROBROMIDE 10 MG/1
10 TABLET ORAL DAILY
Qty: 30 TABLET | Refills: 0 | Status: SHIPPED | OUTPATIENT
Start: 2024-05-26

## 2024-05-29 NOTE — PROGRESS NOTES
Subjective      Chief Complaint   Patient presents with    Anxiety     HPI:  Henrry Glover, 64 y.o. pt presents to [facility] on 05/30/2024 for F/U, pt seen today for psychiatric med management of Anxiety Pt last seen roughly 4 weeks prior, Celexa was started at that time for anxiety.  Patient reports increased stress from tree collapsing on his deck which caused significant damage that he must repair, states insurance will cover it but is still worsening anxiety, patient uses Ativan twice a month, anxiety tied around his medical conditions and appointments, patient reports he took Celexa 1 day and he felt weird and out of it and tired he took this around dinnertime and then stop medication, a week later he went back to sertraline 3 days and had a similar reaction, stop medication, patient has been on Celexa 1/2 tablet X 3 days and reports he has felt tired, educated that this is somewhat common and he should take right before bed if it makes him sleepy, no other side effects reported, positive coping skills discussed including fishing, shopping, going to festivals, going to [place], 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    Primary hypertension    Retained orthopedic hardware    BPH (benign prostatic hyperplasia)    Hepatic fibrosis    Hepatitis C virus infection     Primary pancreatic neuroendocrine tumor    Hemangioma of liver    LAFB (left anterior fascicular block)    History of pulmonary embolus (PE)    Leukocytosis    Ascending aorta enlargement     PSYCHIATRIC HISTORY:    -Previous psych medications:  Ativan 0.5 mg prn  -Previous diagnoses:  Anxiety  -Previous therapy/treatment:   Medications only  -Previous psychiatric hospitalizations:   Denies  -Previous suicide attempts/self harm:   Denies  -History of trauma/abuse:   Physical: Denies  Emotional: Denies  Sexual: Denies  Traumatic Event: Cancer diagnosis and chronic health issues have been very traumatic    Past Medical History:   Diagnosis Date    Acid reflux     Allergic     Anxiety     Arthritis     Benign essential HTN 04/21/2017    BPH (benign prostatic hyperplasia)     Depression     DVT (deep venous thrombosis)     Eczema     Heart murmur     Hemorrhoid     Hepatitis C     history cleared    History of pneumonia     History of transfusion     Sickle cell anemia     Sinus problem     Sleep apnea     cpap    Tinnitus      Past Medical History Pertinent Negatives:   Diagnosis Date Noted    Hard to intubate 03/22/2016    Malignant hyperthermia due to anesthesia 03/22/2016    no family history    PONV (postoperative nausea and vomiting) 03/22/2016    Spinal headache 03/22/2016    Suicide attempt 04/25/2024     Review of Systems   Constitutional:  Positive for fatigue. Negative for chills and fever.   Respiratory:  Negative for chest tightness and shortness of breath.    Cardiovascular:  Negative for chest pain.   Gastrointestinal:  Positive for abdominal pain.        Hernia   Genitourinary:  Positive for frequency.        During the night, BPH   Neurological:  Negative for dizziness and light-headedness.   Psychiatric/Behavioral:  Positive for depressed mood. Negative for sleep disturbance and suicidal ideas. The patient is nervous/anxious.            Objective   Visit Vitals  /64   Pulse 57   Resp 16   Ht  "177.8 cm (70\")   Wt 77.2 kg (170 lb 4.8 oz)   SpO2 96%   BMI 24.44 kg/m²     Wt Readings from Last 3 Encounters:   05/30/24 77.2 kg (170 lb 4.8 oz)   04/25/24 77.5 kg (170 lb 14.4 oz)   04/08/24 75.8 kg (167 lb)     BP Readings from Last 3 Encounters:   05/30/24 122/64   04/25/24 120/60   04/08/24 122/82     Pulse Readings from Last 3 Encounters:   05/30/24 57   04/25/24 60   04/08/24 76      PHYSICAL EXAM:  Constitutional:       Appearance: Normal appearance.   HENT:      Head: Normocephalic.   Pulmonary:      Effort: Pulmonary effort is normal.   Skin:     General: Skin is dry.   Neurological:      Mental Status: The patient is alert and oriented to person, place, and time.      MENTAL STATUS EXAM   General Appearance:  Cleanly groomed and dressed  Eye Contact:  Good eye contact  Attitude:  Cooperative  Motor Activity:  Normal gait, posture  Speech:  Normal rate, tone, volume  Mood and affect:  Normal, pleasant  Thought Process:  Goal-directed  Associations/ Thought Content:  No delusions  Hallucinations:  None  Suicidal Ideations:  Not present  Homicidal Ideation:  Not present  Sensorium:  Alert  Orientation:  Person, place, time and situation  Attention Span/ Concentration:  Good  Fund of Knowledge:  Appropriate for age and educational level  Intellectual Functioning:  Average range  Insight:  Fair  Judgement:  Fair  Reliability:  Fair    LABS:  CBC          1/27/2024    05:33 4/5/2024    00:47 4/22/2024    14:29   CBC   WBC 11.11  11.29  14.7       RBC 3.02  3.39  3.4       Hemoglobin 9.3  10.7  10.6       Hematocrit 27.6  29.1  30.3       MCV 91.4  85.8  88.6       MCH 30.8  31.6  30.9       MCHC 33.7  36.8  34.8       RDW 13.6  14.0  14.3       Platelets 213  232  239          Details          This result is from an external source.             CMP          1/26/2024    06:23 1/27/2024    05:33 4/5/2024    00:47   CMP   Glucose 100  102  99    BUN 7  11  12    Creatinine 0.74  0.76  0.80    EGFR 100.6  99.7  " 97.6    Sodium 140  139  138    Potassium 4.5  4.4  4.0    Chloride 107  106  105    Calcium 9.1  9.1  9.9    Total Protein 6.2  6.6  7.4    Albumin 3.4  3.5  4.4    Globulin 2.8  3.1  3.0    Total Bilirubin 0.7  0.9  1.1    Alkaline Phosphatase 82  81  85    AST (SGOT) 21  20  43    ALT (SGPT) 19  19  22    Albumin/Globulin Ratio 1.2  1.1  1.5    BUN/Creatinine Ratio 9.5  14.5  15.0    Anion Gap 6.4  8.4  8.5        Allergies   Allergen Reactions    Levofloxacin Swelling    Penicillins Swelling    Oxycodone-Acetaminophen GI Intolerance    Sulfamethoxazole-Trimethoprim Rash     Current Outpatient Medications   Medication Instructions    acetaminophen (TYLENOL) 650 mg, Oral, Every 6 Hours PRN    amLODIPine (NORVASC) 5 mg, Oral, Daily    Ascorbic Acid (VITAMIN C PO) 1 dose, Oral, Daily    cetirizine (ZYRTEC) 10 mg, Oral, Daily    citalopram (CELEXA) 10 mg, Oral, Daily    famciclovir (FAMVIR) 500 MG tablet TAKE 2 TABLETS BY MOUTH TWICE DAILY AS NEEDED FOR ONSET OF SYMPTOMS    LORazepam (ATIVAN) 0.5 mg, Oral, As Needed    montelukast (SINGULAIR) 10 MG tablet TAKE 1 TABLET BY MOUTH AT BEDTIME    Multiple Vitamins-Minerals (MULTIVITAMIN PO) 1 tablet, Oral, Daily    nystatin (MYCOSTATIN) 100,000 unit/mL suspension SWISH AND SPIT 5 ML BY MOUTH FOUR TIMES DAILY    ondansetron (ZOFRAN) 4 mg, Oral, Every 8 Hours PRN    pantoprazole (PROTONIX) 40 mg, Oral, Daily    tamsulosin (FLOMAX) 0.4 MG capsule 24 hr capsule Take 1 capsule by mouth 2 (Two) Times a Day.    triamcinolone (KENALOG) 0.025 % cream APPLY TOPICALLY TO THE AFFECTED AREA THREE TIMES DAILY     Assessment    ASSESSMENT/TREATMENT PLAN/INSTRUCTIONS/EDUCATION     (F41.1) HORACIO (generalized anxiety disorder) - unchanged, but pt has not been taking medication daily as instructed  - Plan:  Re-start celexa at bedtime, monitor for sedation, this should be temporary/minor, using ativan 2 times a month  -     FOLLOW UP: Return in about 6 weeks (around 7/11/2024) for  [place].    MEDICATION ISSUES:  KYLIE: Reviewed 05/30/2024      Dosing: Hepatic Impairment: Adult (Buspirone)   Mild to moderate impairment: There are no dosage adjustments provided in the ’s labeling; use with caution (these patients demonstrated increased plasma levels and a prolonged half-life of buspirone).  Severe impairment: Use not recommended.    Dosing: Hepatic Impairment: Adult (Vilazodone)  No dosage adjustment necessary.    Dosing: Hepatic Impairment: Adult (Citalopram)  Maximum recommended dose: 20 mg/day due to decreased clearance and the risk of QT prolongation.    PSYCHOTHERAPY:  In/Start Time: 1130.  Out/Stop Time: 1146.  16 minutes of face to face direct patient care with patient was spent for supportive psychotherapy including strengthening self awareness and insights, strengthening coping skills, counseling the patient regarding diagnoses, and utilizing cognitive behavioral therapy to assist the patient in recognizing more appropriate coping mechanisms which are proven effective in reducing the severity of frequency of symptoms.  This APRN assisted the patient in processing the patient's diagnoses, and also acknowledged and normalized the patient's thoughts, feelings, and concerns This APRN allowed the patient to freely discuss issues without interruption or judgment.      There are no discontinued medications.        No orders of the defined types were placed in this encounter.    -Barriers: medically complex, high risk medication, and age < 25 or > 60  -Strengths:  motivated  and self-reliant    -Short-Term Goals: Pt will be compliant with medication management and note improvement in S/S over the next 4 to 6 weeks or at next scheduled visit.  -Long-Term Goals: Pt will be compliant with the agreed treatment plan including medication regimen & F/U appt's and deny impairment in daily functioning over the next 6 months.      -Progress toward goal: Not at goal  -Functional Status:  Moderate impairment   -Prognosis: Fair with Ongoing Treatment        SUMMARY/DISCUSSION:  -Pt past social/medical/family history reviewed/updated. ROS conducted, medications/allergies, reviewed, patient was screened today for depression/anxiety, PHQ/HORACIO scores reviewed.  Most recent vitals/labs reviewed. Pt was given appropriate time to ask questions and concerns were addressed. A thorough discussion was had that included review of disease process, need for continued monitoring and additional treatment options including use of pharmacological and non-pharmacological approaches to care, decisions were made and agreed upon by patient and provider. Discussed the risks, benefits, and potential side effects of the medications; patient ackowledged and verbally consented.     -Please call the office at [phone] with any worsening of symptoms or onset of intolerable side effects, please ask to leave a message with Gm's medical assistant.  Please give my office up to 48 hours to respond to a patient call/question/refill request.  -Patient is agreeable to call 911 or go to the nearest ER should he/she/they have any thoughts of harm to self or others.  -Patient has been educated on providers schedule, contact information, and patient/provider expectations.   -Patient has been educated regarding multimodal approach with healthy nutrition, healthy sleep, regular physical activity, social activities, counseling, and medications.     Part of this note may be an electronic transcription/translation of spoken language to printed text using the Dragon Dictation System. Some of the data in this electronic note has been brought forward from a previous encounter, any necessary changes have been made, it has been reviewed by this APRN, and it is accurate.    A total of 30 minutes was spent caring for patient today, that time included reviewing past note, updating patient information in the chart, assessing and educating patient on  condition being treated, placing orders, and documenting in the record.  This document has been electronically signed by JENIFER Louis May 30, 2024 12:59 EDT

## 2024-05-30 ENCOUNTER — OFFICE VISIT (OUTPATIENT)
Dept: BEHAVIORAL HEALTH | Facility: CLINIC | Age: 64
End: 2024-05-30
Payer: MEDICARE

## 2024-05-30 VITALS
OXYGEN SATURATION: 96 % | SYSTOLIC BLOOD PRESSURE: 122 MMHG | WEIGHT: 170.3 LBS | HEART RATE: 57 BPM | RESPIRATION RATE: 16 BRPM | HEIGHT: 70 IN | DIASTOLIC BLOOD PRESSURE: 64 MMHG | BODY MASS INDEX: 24.38 KG/M2

## 2024-05-30 DIAGNOSIS — F41.1 GAD (GENERALIZED ANXIETY DISORDER): Primary | ICD-10-CM

## 2024-05-30 NOTE — PATIENT INSTRUCTIONS
Medication changes:   Celexa,citalopram 1/2 tablet X 7 days then increase to 1 tablet  Ativan, continue as needed (use only for very bad days)  Patient educated they must be on psychiatric medications consistently for at least 4 to 6 weeks to get maximum benefit.    GENERAL NEW PATIENT INSTRUCTIONS:    -Please arrive in person or virtually 10-15 minutes prior to appointment to allow for registration/sign in/questionnaires. If you are seen virtually please review after visit summary (AVS)/patient instructions via [employer] for a summary of plan of care/changes in treatment plan. If you are having difficulties logging on or accessing [employer] please contact my office for assistance.    -The best way to get a hold of Gm is to call the office at [phone] and ask to leave a message with his medical assistant. Gm Moreno is a Psychiatric Mental Health Nurse Practitioner, due to his specialty patient's are not able to message him directly via [employer]. Please give my office up to 48 hours to respond to a patient call/question/refill request. Refill requests will be made during normal office hours only, Monday-Friday 8:00-5:30.      -Gm is out of the office on Fridays and weekends. Tuesdays and Thursdays are Gm's in-office days, Mondays and Wednesdays are his telehealth days.  The decision to be seen virtually or in person is up to the discretion of the provider, not all behavioral health problems are appropriate for telehealth.    -Please call the office at [phone] with any worsening of symptoms or onset of intolerable side effects.  -Follow-up appointments must be maintained in order for prescribing to continue.  -Patient has been educated regarding multimodal approach with healthy nutrition, healthy sleep, regular physical activity, social activities, counseling, and medications.   -Please call 911 or go to the nearest ER if you begin to have thoughts of harming yourself or other people.

## 2024-06-05 ENCOUNTER — OFFICE VISIT (OUTPATIENT)
Dept: SURGERY | Facility: CLINIC | Age: 64
End: 2024-06-05
Payer: MEDICARE

## 2024-06-05 VITALS
HEIGHT: 70 IN | BODY MASS INDEX: 24.45 KG/M2 | WEIGHT: 170.8 LBS | OXYGEN SATURATION: 94 % | HEART RATE: 65 BPM | DIASTOLIC BLOOD PRESSURE: 68 MMHG | SYSTOLIC BLOOD PRESSURE: 122 MMHG

## 2024-06-05 DIAGNOSIS — R91.1 SOLITARY PULMONARY NODULE: Primary | ICD-10-CM

## 2024-06-05 PROCEDURE — 3078F DIAST BP <80 MM HG: CPT | Performed by: STUDENT IN AN ORGANIZED HEALTH CARE EDUCATION/TRAINING PROGRAM

## 2024-06-05 PROCEDURE — 3074F SYST BP LT 130 MM HG: CPT | Performed by: STUDENT IN AN ORGANIZED HEALTH CARE EDUCATION/TRAINING PROGRAM

## 2024-06-05 PROCEDURE — 99213 OFFICE O/P EST LOW 20 MIN: CPT | Performed by: STUDENT IN AN ORGANIZED HEALTH CARE EDUCATION/TRAINING PROGRAM

## 2024-06-05 NOTE — PROGRESS NOTES
"Chief Complaint  6 month CT scan follow up     Subjective        Henrry Glover presents to [facility]  History of Present Illness  Mr. Glover is a very pleasant 63-year-old gentleman who we were asked to see after a recent emergency department visit where he had a CT of the chest performed for chest pain.  During this CTA there was an incidentally found micronodule in the right lower lobe.  In addition he has some fibrotic changes in appearance on his CT scan of his lungs.  He is a previous smoker and stopped smoking over 35 years ago.  He has no other environmental exposures.  He denies any current symptoms.  He denies any shortness of breath, fevers and chills.  He denies any cough.  He denies any unintentional weight loss.  He denies any hemoptysis.    Mr. Glover is a pleasant 64-year-old gentleman who is here in follow-up today after he had a 6-month noncontrasted CT scan of the chest.  He originally obtained a CT scan of the chest because of the above.  His past medical history as described above.  In the last 6 months he denies any change in his symptoms.  He denies any respiratory symptoms.  He denies any fevers, chills or cough.  Overall he is doing very well.  Objective   Vital Signs:  /68 (BP Location: Left arm, Patient Position: Sitting, Cuff Size: Adult)   Pulse 65   Ht 177.8 cm (70\")   Wt 77.5 kg (170 lb 12.8 oz)   SpO2 94%   BMI 24.51 kg/m²   Estimated body mass index is 24.51 kg/m² as calculated from the following:    Height as of this encounter: 177.8 cm (70\").    Weight as of this encounter: 77.5 kg (170 lb 12.8 oz).       BMI is within normal parameters. No other follow-up for BMI required.      Physical Exam  Constitutional:       Appearance: Normal appearance.   Cardiovascular:      Rate and Rhythm: Normal rate.      Pulses: Normal pulses.   Pulmonary:      Effort: Pulmonary effort is normal.   Skin:     Capillary Refill: Capillary refill takes less than 2 " seconds.   Neurological:      General: No focal deficit present.      Mental Status: He is alert and oriented to person, place, and time. Mental status is at baseline.   Psychiatric:         Mood and Affect: Mood normal.         Behavior: Behavior normal.         Thought Content: Thought content normal.         Judgment: Judgment normal.        Result Review :  CT scan performed on 5/23/2024 was visualized and reviewed by myself.  He has some stable micronodules and changes noted throughout.  No new nodules appreciated.           Assessment and Plan   Diagnoses and all orders for this visit:    1. Solitary pulmonary nodule (Primary)  -     CT Chest Without Contrast; Future      Mr. Glover is a pleasant 64-year-old gentleman who presents today with a interval CT scan of the chest after he was found to have multiple micronodules during an episode of chest pain back in November 2023.  He is an ex-smoker and stopped smoking over 35 years ago.  He denies any current respiratory symptoms.  His interval CT scan of the chest shows stability of these micronodules.  I think it would be appropriate to obtain a scan in 6 months time.  At that time if they are stable, possibly consider yearly scans versus no more future scans.  Will see him back in 6 months time.     I spent 20 minutes caring for Henrry on this date of service. This time includes time spent by me in the following activities:preparing for the visit, reviewing tests, obtaining and/or reviewing a separately obtained history, performing a medically appropriate examination and/or evaluation , counseling and educating the patient/family/caregiver, ordering medications, tests, or procedures, referring and communicating with other health care professionals , documenting information in the medical record, independently interpreting results and communicating that information with the patient/family/caregiver, and care coordination  Follow Up   No follow-ups on  file.  Patient was given instructions and counseling regarding his condition or for health maintenance advice. Please see specific information pulled into the AVS if appropriate.

## 2024-06-17 ENCOUNTER — OFFICE VISIT (OUTPATIENT)
Dept: FAMILY MEDICINE CLINIC | Facility: CLINIC | Age: 64
End: 2024-06-17
Payer: MEDICARE

## 2024-06-17 VITALS
RESPIRATION RATE: 18 BRPM | SYSTOLIC BLOOD PRESSURE: 128 MMHG | OXYGEN SATURATION: 94 % | WEIGHT: 177 LBS | HEIGHT: 70 IN | HEART RATE: 88 BPM | BODY MASS INDEX: 25.34 KG/M2 | DIASTOLIC BLOOD PRESSURE: 78 MMHG

## 2024-06-17 DIAGNOSIS — Z00.00 ROUTINE MEDICAL EXAM: Primary | ICD-10-CM

## 2024-06-17 DIAGNOSIS — I10 PRIMARY HYPERTENSION: ICD-10-CM

## 2024-06-17 DIAGNOSIS — Z00.00 WELCOME TO MEDICARE PREVENTIVE VISIT: ICD-10-CM

## 2024-06-17 NOTE — PATIENT INSTRUCTIONS
Advance Care Planning and Advance Directives     You make decisions on a daily basis - decisions about where you want to live, your career, your home, your life. Perhaps one of the most important decisions you face is your choice for future medical care. Take time to talk with your family and your healthcare team and start planning today.  Advance Care Planning is a process that can help you:  Understand possible future healthcare decisions in light of your own experiences  Reflect on those decision in light of your goals and values  Discuss your decisions with those closest to you and the healthcare professionals that care for you  Make a plan by creating a document that reflects your wishes    Surrogate Decision Maker  In the event of a medical emergency, which has left you unable to communicate or to make your own decisions, you would need someone to make decisions for you.  It is important to discuss your preferences for medical treatment with this person while you are in good health.     Qualities of a surrogate decision maker:  Willing to take on this role and responsibility  Knows what you want for future medical care  Willing to follow your wishes even if they don't agree with them  Able to make difficult medical decisions under stressful circumstances    Advance Directives  These are legal documents you can create that will guide your healthcare team and decision maker(s) when needed. These documents can be stored in the electronic medical record.    Living Will - a legal document to guide your care if you have a terminal condition or a serious illness and are unable to communicate. States vary by statute in document names/types, but most forms may include one or more of the following:        -  Directions regarding life-prolonging treatments        -  Directions regarding artificially provided nutrition/hydration        -  Choosing a healthcare decision maker        -  Direction regarding organ/tissue  donation    Durable Power of  for Healthcare - this document names an -in-fact to make medical decisions for you, but it may also allow this person to make personal and financial decisions for you. Please seek the advice of an  if you need this type of document.    **Advance Directives are not required and no one may discriminate against you if you do not sign one.    Medical Orders  Many states allow specific forms/orders signed by your physician to record your wishes for medical treatment in your current state of health. This form, signed in personal communication with your physician, addresses resuscitation and other medical interventions that you may or may not want.      For more information or to schedule a time with a [facility] Advance Care Planning Facilitator contact: [email or web address] or call [phone] and someone will contact you directly.

## 2024-06-17 NOTE — PROGRESS NOTES
"Medicare Welcome Wellness Visit     Henrry Glover is a 64 y.o. male who presents for a Medicare Wellness Visit.    Compared to one year ago, the patient feels his physical health is the same and his mental health is the same.  Recent Hospitalizations:  This patient has had a [facility] admission record on file within the last 365 days. Had a sickling episode.   Current Medical Providers:  Patient Care Team:  Henny Patrick MD as PCP - General  Prasanna Reagan MD as Consulting Physician (Orthopedic Surgery)  Enrrique Moreno APRN as Nurse Practitioner (Psychiatry)  No opioid medication identified on active medication list. I have reviewed chart for other potential  high risk medication/s and harmful drug interactions in the elderly.  Aspirin is not on active medication list.  Aspirin use is not indicated based on review of current medical condition/s. Risk of harm outweighs potential benefits.  .  Advance Care Planning   Advance Directive is not on file.  ACP discussion was held with the patient during this visit. Patient does not have an advance directive, information provided.     Estimated body mass index is 25.4 kg/m² as calculated from the following:    Height as of this encounter: 177.8 cm (70\").    Weight as of this encounter: 80.3 kg (177 lb).  BMI is within normal parameters. No other follow-up for BMI required.     Vision Screening    Right eye Left eye Both eyes   Without correction 20/20 20/20 20/15   With correction         Does the patient have evidence of cognitive impairment? No  HEALTH RISK ASSESSMENT  Smoking Status:  Social History     Tobacco Use   Smoking Status Former    Current packs/day: 0.00    Average packs/day: 0.3 packs/day for 10.0 years (2.5 ttl pk-yrs)    Types: Cigarettes    Start date: 1971    Quit date: 1981    Years since quittin.0   Smokeless Tobacco Never     Alcohol Consumption:  Social History     Substance and Sexual Activity   Alcohol Use No     Fall " Risk Screen:  [facility] Fall Risk Assessment was completed, and patient is at LOW risk for falls.Assessment completed on:2024  Depression Screenin/17/2024     2:32 PM   PHQ-2/PHQ-9 Depression Screening   Little Interest or Pleasure in Doing Things 1-->several days   Feeling Down, Depressed or Hopeless 1-->several days   Trouble Falling or Staying Asleep, or Sleeping Too Much 0-->not at all   Feeling Tired or Having Little Energy 1-->several days   Poor Appetite or Overeating 0-->not at all   Feeling Bad about Yourself - or that You are a Failure or Have Let Yourself or Your Family Down 0-->not at all   Trouble Concentrating on Things, Such as Reading the Newspaper or Watching Television 1-->several days   Moving or Speaking So Slowly that Other People Could Have Noticed? Or the Opposite - Being So Fidgety 0-->not at all   Thoughts that You Would be Better Off Dead or of Hurting Yourself in Some Way 0-->not at all   PHQ-9: Brief Depression Severity Measure Score 4   If You Checked Off Any Problems, How Difficult Have These Problems Made It For You to Do Your Work, Take Care of Things at Home, or Get Along with Other People? not difficult at all       TUG test results: Normal balance and walk. 9 seconds - high mobility    Health Habits and Functional and Cognitive Screenin/17/2024     1:30 PM   Functional & Cognitive Status   Do you have difficulty preparing food and eating? No   Do you have difficulty bathing yourself, getting dressed or grooming yourself? No   Do you have difficulty using the toilet? No   Do you have difficulty moving around from place to place? No   Do you have trouble with steps or getting out of a bed or a chair? No   Current Diet Limited Junk Food   Dental Exam Other   Eye Exam Other   Exercise (times per week) 4 times per week   Current Exercises Include Walking   Do you need help using the phone?  No   Are you deaf or do you have serious difficulty hearing?  No   Do you  need help to go to places out of walking distance? No   Do you need help shopping? No   Do you need help preparing meals?  No   Do you need help with housework?  No   Do you need help with laundry? No   Do you need help taking your medications? No   Do you need help managing money? No   Do you ever drive or ride in a car without wearing a seat belt? No   Have you felt unusual stress, anger or loneliness in the last month? No   Who do you live with? Alone   If you need help, do you have trouble finding someone available to you? No   Have you been bothered in the last four weeks by sexual problems? No   Do you have difficulty concentrating, remembering or making decisions? No       Health Habits  Current Diet: Limited Junk Food  Dental Exam: Other  Eye Exam: Other  Exercise (times per week): 4 times per week  Current Exercises Include: Walking  Age-appropriate Screening Schedule:  Refer to the list below for future screening recommendations based on patient's age, sex and/or medical conditions. Orders for these recommended tests are listed in the plan section. The patient has been provided with a written plan.  Health Maintenance   Topic Date Due    RSV Vaccine - Adults (1 - 1-dose 60+ series) Never done    COVID-19 Vaccine (5 - 2023-24 season) 09/01/2023    BMI FOLLOWUP  01/30/2024    INFLUENZA VACCINE  08/01/2024    ANNUAL WELLNESS VISIT  06/17/2025    TDAP/TD VACCINES (2 - Td or Tdap) 11/21/2027    COLORECTAL CANCER SCREENING  09/11/2028    HEPATITIS C SCREENING  Completed    Pneumococcal Vaccine 65+  Completed    AAA SCREEN (ONE-TIME)  Completed    ZOSTER VACCINE  Completed    Hepatitis B  Discontinued        ECG 12 Lead    Date/Time: 6/17/2024 2:55 PM  Performed by: Henny Patrick MD    Authorized by: Henny Patrick MD  Comparison: compared with previous ECG from 7/6/2016  Similar to previous ECG  Rhythm: sinus rhythm  Rate: normal  Conduction: conduction normal  ST Segments: ST segments normal  T Waves: T  waves normal  QRS axis: normal  Other: no other findings  Other findings: left ventricular hypertrophy    Clinical impression: non-specific ECG  Comments: LAD, unchanged EKG from 2016        CMS Preventative Services Quick Reference  Risk Factors Identified During Encounter  None Identified  The above risks/problems have been discussed with the patient.  Follow up actions/plans if indicated are seen below in the Assessment/Plan Section.  Pertinent information has been shared with the patient in the After Visit Summary.  Patient Instructions        Follow Up: Medicare visit in one year  An After Visit Summary and PPPS were given/sent to the patient.    Patient has multiple medical problems which are significant and separately identifiable that require additional work above and beyond the Medicare Wellness Visit. These are not trivial or insignificant and are billed with a 25-modifier  Problem Visit:       Assessment & Plan  Primary hypertension  BP is controlled well. He will recheck in six months. He will continue present meds. Prescription will be sent when notified by pharmacy..   Orders Placed This Encounter   Procedures    Lipid Panel With LDL / HDL Ratio    ECG 12 Lead           Patient was given instructions and counseling regarding his condition or for health maintenance advice. Please see specific information pulled into the AVS if appropriate.          Henrry is a 64 y.o. being seen today for HTN   HISTORY    HPI    No complaints.  Taking meds as ordered.  No headache, chest pain or shortness of breath.   Social History  He  reports that he quit smoking about 43 years ago. His smoking use included cigarettes. He started smoking about 53 years ago. He has a 2.5 pack-year smoking history. He has never used smokeless tobacco. He reports that he does not drink alcohol and does not use drugs.  EXAM DATA    Vital Signs        BP Readings from Last 1 Encounters:   06/17/24 128/78     Wt Readings from Last 3  Encounters:   06/17/24 80.3 kg (177 lb)   06/05/24 77.5 kg (170 lb 12.8 oz)   05/30/24 77.2 kg (170 lb 4.8 oz)   Body mass index is 25.4 kg/m².  Physical Exam  Vitals reviewed.   Constitutional:       Appearance: Normal appearance.   Cardiovascular:      Rate and Rhythm: Normal rate and regular rhythm.      Heart sounds: Murmur (1/6 LUSB) heard.      Gallop (S4) present.   Pulmonary:      Effort: Pulmonary effort is normal.      Breath sounds: Normal breath sounds. No wheezing.   Neurological:      Mental Status: He is alert and oriented to person, place, and time.   Psychiatric:         Mood and Affect: Mood normal.         Behavior: Behavior normal.         Thought Content: Thought content normal.         Judgment: Judgment normal.

## 2024-06-17 NOTE — ASSESSMENT & PLAN NOTE
BP is controlled well. He will recheck in six months. He will continue present meds. Prescription will be sent when notified by pharmacy..

## 2024-06-18 LAB
CHOLEST SERPL-MCNC: 108 MG/DL (ref 100–199)
HDLC SERPL-MCNC: 56 MG/DL
LDLC SERPL CALC-MCNC: 36 MG/DL (ref 0–99)
LDLC/HDLC SERPL: 0.6 RATIO (ref 0–3.6)
TRIGL SERPL-MCNC: 80 MG/DL (ref 0–149)
VLDLC SERPL CALC-MCNC: 16 MG/DL (ref 5–40)

## 2024-06-28 DIAGNOSIS — F41.1 GAD (GENERALIZED ANXIETY DISORDER): ICD-10-CM

## 2024-06-28 DIAGNOSIS — F41.9 ANXIETY: Primary | ICD-10-CM

## 2024-07-01 DIAGNOSIS — F41.1 GAD (GENERALIZED ANXIETY DISORDER): Primary | ICD-10-CM

## 2024-07-01 RX ORDER — CITALOPRAM HYDROBROMIDE 10 MG/1
10 TABLET ORAL DAILY
Qty: 30 TABLET | Refills: 0 | Status: SHIPPED | OUTPATIENT
Start: 2024-07-01

## 2024-07-02 RX ORDER — LORAZEPAM 0.5 MG/1
0.5 TABLET ORAL AS NEEDED
Qty: 25 TABLET | Refills: 0 | Status: SHIPPED | OUTPATIENT
Start: 2024-07-02

## 2024-07-02 NOTE — TELEPHONE ENCOUNTER
The last we spoke his PCP was writing this, I will discuss this with him at follow-up appointment on 7/11/2024

## 2024-07-02 NOTE — TELEPHONE ENCOUNTER
LAST REFILL - 02/22/24  LAST VISIT - 06/17/24  NEXT VISIT - none scheduled with PCP    Enrrique will not begin to fill until he speaks with the patient at his next scheduled follow-up.

## 2024-07-03 DIAGNOSIS — F41.1 GAD (GENERALIZED ANXIETY DISORDER): ICD-10-CM

## 2024-07-03 RX ORDER — CITALOPRAM HYDROBROMIDE 10 MG/1
10 TABLET ORAL DAILY
Qty: 30 TABLET | Refills: 0 | OUTPATIENT
Start: 2024-07-03

## 2024-07-11 ENCOUNTER — OFFICE VISIT (OUTPATIENT)
Dept: BEHAVIORAL HEALTH | Facility: CLINIC | Age: 64
End: 2024-07-11
Payer: MEDICARE

## 2024-07-11 VITALS
BODY MASS INDEX: 24.77 KG/M2 | OXYGEN SATURATION: 97 % | DIASTOLIC BLOOD PRESSURE: 68 MMHG | WEIGHT: 173 LBS | SYSTOLIC BLOOD PRESSURE: 138 MMHG | HEIGHT: 70 IN | HEART RATE: 62 BPM

## 2024-07-11 DIAGNOSIS — F41.1 GAD (GENERALIZED ANXIETY DISORDER): Primary | ICD-10-CM

## 2024-07-11 NOTE — PROGRESS NOTES
Subjective      Chief Complaint   Patient presents with    Anxiety     HPI:  12  Henrry Glover, 64 y.o. pt presents to [facility] on 07/11/2024 for F/U, pt seen today for psychiatric med management of Anxiety  Overall patient reports he is doing slightly better, uses Ativan once every 2 weeks, reports he is only taking one half of the citalopram due to fatigue/lethargy, has been taking at bedtime, triggering events include doctors appointments, patient has extensive medical history, continues to work at [employer], would like to go out and fish more but has severe allergies, patient advised he can continue taking one half escitalopram and that his PCP may be willing to take over this medication, agreed to follow-up with myself in 6 months.    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    Primary hypertension    Retained orthopedic hardware    BPH (benign prostatic hyperplasia)    Hepatic fibrosis    Hepatitis C virus infection    Primary pancreatic neuroendocrine tumor    Hemangioma of liver    LAFB (left anterior fascicular block)    History of pulmonary embolus (PE)    Leukocytosis    Ascending aorta enlargement     PSYCHIATRIC HISTORY:    -Previous psych medications:  Ativan 0.5 mg prn  -Previous diagnoses:  Anxiety  -Previous therapy/treatment:   Medications only  -Previous psychiatric hospitalizations:   Denies  -Previous suicide attempts/self harm:   Denies  -History of trauma/abuse:   Physical: Denies  Emotional: Denies  Sexual: Denies  Traumatic Event: Cancer diagnosis and chronic health issues have been very traumatic    Past Medical History:   Diagnosis Date    Acid reflux     Allergic     Anxiety     Arthritis     Benign essential HTN 04/21/2017    BPH (benign prostatic hyperplasia)     Depression     DVT (deep venous  "thrombosis)     Eczema     Heart murmur     Hemorrhoid     Hepatitis C     history cleared    History of pneumonia     History of transfusion     Sickle cell anemia     Sinus problem     Sleep apnea     cpap    Tinnitus      Past Medical History Pertinent Negatives:   Diagnosis Date Noted    Hard to intubate 03/22/2016    Malignant hyperthermia due to anesthesia 03/22/2016    no family history    PONV (postoperative nausea and vomiting) 03/22/2016    Spinal headache 03/22/2016    Suicide attempt 04/25/2024     Review of Systems   Constitutional:  Positive for fatigue. Negative for chills and fever.   Respiratory:  Negative for chest tightness and shortness of breath.    Cardiovascular:  Negative for chest pain.   Gastrointestinal:         Hernia   Genitourinary:  Positive for frequency.        During the night, BPH   Neurological:  Negative for dizziness and light-headedness.   Psychiatric/Behavioral:  Negative for sleep disturbance and suicidal ideas. The patient is nervous/anxious.            Objective   Visit Vitals  /68   Pulse 62   Ht 177.8 cm (70\")   Wt 78.5 kg (173 lb)   SpO2 97%   BMI 24.82 kg/m²       Wt Readings from Last 3 Encounters:   07/11/24 78.5 kg (173 lb)   06/17/24 80.3 kg (177 lb)   06/05/24 77.5 kg (170 lb 12.8 oz)     BP Readings from Last 3 Encounters:   07/11/24 138/68   06/17/24 128/78   06/05/24 122/68     Pulse Readings from Last 3 Encounters:   07/11/24 62   06/17/24 88   06/05/24 65      PHYSICAL EXAM:  Constitutional:       Appearance: Normal appearance.   HENT:      Head: Normocephalic.   Pulmonary:      Effort: Pulmonary effort is normal.   Skin:     General: Skin is dry.   Neurological:      Mental Status: The patient is alert and oriented to person, place, and time.      MENTAL STATUS EXAM   General Appearance:  Cleanly groomed and dressed  Eye Contact:  Good eye contact  Attitude:  Cooperative  Motor Activity:  Normal gait, posture  Speech:  Normal rate, tone, volume  Mood " and affect:  Normal, pleasant  Thought Process:  Goal-directed  Associations/ Thought Content:  No delusions  Hallucinations:  None  Suicidal Ideations:  Not present  Homicidal Ideation:  Not present  Sensorium:  Alert  Orientation:  Person, place, time and situation  Attention Span/ Concentration:  Good  Fund of Knowledge:  Appropriate for age and educational level  Intellectual Functioning:  Average range  Insight:  Fair  Judgement:  Fair  Reliability:  Fair    LABS:  CBC          1/27/2024    05:33 4/5/2024    00:47 4/22/2024    14:29   CBC   WBC 11.11  11.29  14.7       RBC 3.02  3.39  3.4       Hemoglobin 9.3  10.7  10.6       Hematocrit 27.6  29.1  30.3       MCV 91.4  85.8  88.6       MCH 30.8  31.6  30.9       MCHC 33.7  36.8  34.8       RDW 13.6  14.0  14.3       Platelets 213  232  239          Details          This result is from an external source.             CMP          1/26/2024    06:23 1/27/2024    05:33 4/5/2024    00:47   CMP   Glucose 100  102  99    BUN 7  11  12    Creatinine 0.74  0.76  0.80    EGFR 100.6  99.7  97.6    Sodium 140  139  138    Potassium 4.5  4.4  4.0    Chloride 107  106  105    Calcium 9.1  9.1  9.9    Total Protein 6.2  6.6  7.4    Albumin 3.4  3.5  4.4    Globulin 2.8  3.1  3.0    Total Bilirubin 0.7  0.9  1.1    Alkaline Phosphatase 82  81  85    AST (SGOT) 21  20  43    ALT (SGPT) 19  19  22    Albumin/Globulin Ratio 1.2  1.1  1.5    BUN/Creatinine Ratio 9.5  14.5  15.0    Anion Gap 6.4  8.4  8.5        Allergies   Allergen Reactions    Levofloxacin Swelling    Penicillins Swelling    Oxycodone-Acetaminophen GI Intolerance    Sulfamethoxazole-Trimethoprim Rash     Current Outpatient Medications   Medication Instructions    acetaminophen (TYLENOL) 650 mg, Oral, Every 6 Hours PRN    amLODIPine (NORVASC) 5 mg, Oral, Daily    Ascorbic Acid (VITAMIN C PO) 1 dose, Oral, Daily    cetirizine (ZYRTEC) 10 mg, Oral, Daily    citalopram (CELEXA) 10 mg, Oral, Daily    famciclovir  (FAMVIR) 500 MG tablet TAKE 2 TABLETS BY MOUTH TWICE DAILY AS NEEDED FOR ONSET OF SYMPTOMS    LORazepam (ATIVAN) 0.5 mg, Oral, As Needed    montelukast (SINGULAIR) 10 MG tablet TAKE 1 TABLET BY MOUTH AT BEDTIME    Multiple Vitamins-Minerals (MULTIVITAMIN PO) 1 tablet, Oral, Daily    nystatin (MYCOSTATIN) 100,000 unit/mL suspension SWISH AND SPIT 5 ML BY MOUTH FOUR TIMES DAILY    ondansetron (ZOFRAN) 4 mg, Oral, Every 8 Hours PRN    pantoprazole (PROTONIX) 40 mg, Oral, Daily    tamsulosin (FLOMAX) 0.4 MG capsule 24 hr capsule Take 1 capsule by mouth 2 (Two) Times a Day.    triamcinolone (KENALOG) 0.025 % cream APPLY TOPICALLY TO THE AFFECTED AREA THREE TIMES DAILY     Assessment    ASSESSMENT/TREATMENT PLAN/INSTRUCTIONS/EDUCATION     (F41.1) HORACIO (generalized anxiety disorder)    -Continue one half Celexa, continue Ativan as needed, uses once every 2 weeks on average    FOLLOW UP: Return in about 6 months (around 1/11/2025) for Recheck.    MEDICATION ISSUES:  KYLIE: Reviewed 07/11/2024      Dosing: Hepatic Impairment: Adult (Buspirone)   Mild to moderate impairment: There are no dosage adjustments provided in the ’s labeling; use with caution (these patients demonstrated increased plasma levels and a prolonged half-life of buspirone).  Severe impairment: Use not recommended.    Dosing: Hepatic Impairment: Adult (Vilazodone)  No dosage adjustment necessary.    Dosing: Hepatic Impairment: Adult (Citalopram)  Maximum recommended dose: 20 mg/day due to decreased clearance and the risk of QT prolongation.    There are no discontinued medications.        No orders of the defined types were placed in this encounter.    -Barriers: medically complex, high risk medication, and age < 25 or > 60  -Strengths:  motivated  and self-reliant    -Short-Term Goals: Pt will be compliant with medication management and note improvement in S/S over the next 4 to 6 weeks or at next scheduled visit.  -Long-Term Goals: Pt will be  compliant with the agreed treatment plan including medication regimen & F/U appt's and deny impairment in daily functioning over the next 6 months.      -Progress toward goal: at goal  -Functional Status: Minor impairment   -Prognosis: Fair with Ongoing Treatment        SUMMARY/DISCUSSION:  -Pt past social/medical/family history reviewed/updated. ROS conducted, medications/allergies, reviewed, patient was screened today for depression/anxiety, PHQ/HORACIO scores reviewed.  Most recent vitals/labs reviewed. Pt was given appropriate time to ask questions and concerns were addressed. A thorough discussion was had that included review of disease process, need for continued monitoring and additional treatment options including use of pharmacological and non-pharmacological approaches to care, decisions were made and agreed upon by patient and provider. Discussed the risks, benefits, and potential side effects of the medications; patient ackowledged and verbally consented.     -Please call the office at [phone] with any worsening of symptoms or onset of intolerable side effects, please ask to leave a message with Gm's medical assistant.  Please give my office up to 48 hours to respond to a patient call/question/refill request.  -Patient is agreeable to call 911 or go to the nearest ER should he/she/they have any thoughts of harm to self or others.  -Patient has been educated on providers schedule, contact information, and patient/provider expectations.   -Patient has been educated regarding multimodal approach with healthy nutrition, healthy sleep, regular physical activity, social activities, counseling, and medications.     Part of this note may be an electronic transcription/translation of spoken language to printed text using the Dragon Dictation System. Some of the data in this electronic note has been brought forward from a previous encounter, any necessary changes have been made, it has been reviewed by this  JENIFER, and it is accurate.    A total of 30 minutes was spent caring for patient today, that time included reviewing past note, updating patient information in the chart, assessing and educating patient on condition being treated, placing orders, and documenting in the record.    This document has been electronically signed by JENIFER Louis July 11, 2024 12:42 EDT

## 2024-07-11 NOTE — PATIENT INSTRUCTIONS
GENERAL NEW PATIENT INSTRUCTIONS:    -Please arrive in person or virtually 10-15 minutes prior to appointment to allow for registration/sign in/questionnaires. If you are seen virtually please review after visit summary (AVS)/patient instructions via [employer] for a summary of plan of care/changes in treatment plan. If you are having difficulties logging on or accessing [employer] please contact my office for assistance.    -The best way to get a hold of Gm is to call the office at [phone] and ask to leave a message with his medical assistant. Gm Moreno is a Psychiatric Mental Health Nurse Practitioner, due to his specialty patient's are not able to message him directly via [employer]. Please give my office up to 48 hours to respond to a patient call/question/refill request. Refill requests will be made during normal office hours only, Monday-Friday 8:00-5:30.      -Gm is out of the office on Fridays and weekends. Tuesdays and Thursdays are Gm's in-office days, Mondays and Wednesdays are his telehealth days.  The decision to be seen virtually or in person is up to the discretion of the provider, not all behavioral health problems are appropriate for telehealth.    -Please call the office at [phone] with any worsening of symptoms or onset of intolerable side effects.  -Follow-up appointments must be maintained in order for prescribing to continue.  -Patient has been educated regarding multimodal approach with healthy nutrition, healthy sleep, regular physical activity, social activities, counseling, and medications.   -Please call 911 or go to the nearest ER if you begin to have thoughts of harming yourself or other people.    No show policy:  We understand unexpected circumstances arise; however, anytime you miss your appointment we are unable to provide you appropriate care.  In addition, each appointment missed could have been used to provide care for others.  We ask that you call at least 24 hours in  advance to cancel or reschedule an appointment. We would like to take this opportunity to remind you of our policy stating patients who miss THREE or more appointments without cancelling or rescheduling 24 hours in advance of the appointment may be subject to cancellation of any further visits with our clinic and recommendation to seek in-person services/visits. Please call [phone] to reschedule your appointment. If there are reasons that make it difficult for you to keep the appointments, please call and let us know how we can help. Please understand that medication prescribing will not continue without seeing your provider.

## 2024-07-17 RX ORDER — LORAZEPAM 0.5 MG/1
0.5 TABLET ORAL EVERY 8 HOURS PRN
Qty: 25 TABLET | Refills: 0 | Status: SHIPPED | OUTPATIENT
Start: 2024-07-17

## 2024-07-17 NOTE — TELEPHONE ENCOUNTER
Caller: Henrry Glover    Relationship to patient: Self    Best call back number: [phone]    Patient is needing: HE HAS A STYE ON HIS RIGHT EYE AND IT IS PAINFUL.  HE WOULD LIKE TO KNOW WHAT TO DO  ABOUT IT.  IS THERE ANYTHING HE CAN DO TO HELP IT?

## 2024-07-22 DIAGNOSIS — K12.1 STOMATITIS: ICD-10-CM

## 2024-07-22 NOTE — TELEPHONE ENCOUNTER
Caller: Henrry Glover    Relationship: Self    Best call back number: [phone]    Requested Prescriptions:   Requested Prescriptions     Pending Prescriptions Disp Refills    nystatin (MYCOSTATIN) 100,000 unit/mL suspension 60 mL 1      Pharmacy where request should be sent: [id number] [place], [place] - [address] AT [facility] [phone] [facility] [phone]      Last office visit with prescribing clinician: 6/17/2024   Last telemedicine visit with prescribing clinician: Visit date not found   Next office visit with prescribing clinician: Visit date not found     Additional details provided by patient: PT IS OUT OF MEDICATION.     Does the patient have less than a 3 day supply:  [x] Yes  [] No    Would you like a call back once the refill request has been completed: [] Yes [x] No    Hector Fitzgerald Rep   07/22/24 13:10 EDT

## 2024-07-27 DIAGNOSIS — J45.909 ASTHMA, UNSPECIFIED ASTHMA SEVERITY, UNSPECIFIED WHETHER COMPLICATED, UNSPECIFIED WHETHER PERSISTENT: ICD-10-CM

## 2024-07-29 RX ORDER — MONTELUKAST SODIUM 10 MG/1
TABLET ORAL
Qty: 90 TABLET | Refills: 2 | Status: SHIPPED | OUTPATIENT
Start: 2024-07-29

## 2024-08-02 ENCOUNTER — OFFICE VISIT (OUTPATIENT)
Dept: FAMILY MEDICINE CLINIC | Facility: CLINIC | Age: 64
End: 2024-08-02
Payer: MEDICARE

## 2024-08-02 VITALS
HEART RATE: 71 BPM | DIASTOLIC BLOOD PRESSURE: 84 MMHG | BODY MASS INDEX: 24.77 KG/M2 | HEIGHT: 70 IN | RESPIRATION RATE: 18 BRPM | OXYGEN SATURATION: 96 % | WEIGHT: 173 LBS | SYSTOLIC BLOOD PRESSURE: 122 MMHG

## 2024-08-02 DIAGNOSIS — K12.1 STOMATITIS: Primary | ICD-10-CM

## 2024-08-02 PROCEDURE — 99213 OFFICE O/P EST LOW 20 MIN: CPT | Performed by: NURSE PRACTITIONER

## 2024-08-02 PROCEDURE — 1159F MED LIST DOCD IN RCRD: CPT | Performed by: NURSE PRACTITIONER

## 2024-08-02 PROCEDURE — 1126F AMNT PAIN NOTED NONE PRSNT: CPT | Performed by: NURSE PRACTITIONER

## 2024-08-02 PROCEDURE — 3074F SYST BP LT 130 MM HG: CPT | Performed by: NURSE PRACTITIONER

## 2024-08-02 PROCEDURE — 3079F DIAST BP 80-89 MM HG: CPT | Performed by: NURSE PRACTITIONER

## 2024-08-02 PROCEDURE — 1160F RVW MEDS BY RX/DR IN RCRD: CPT | Performed by: NURSE PRACTITIONER

## 2024-08-02 RX ORDER — FLUCONAZOLE 150 MG/1
150 TABLET ORAL ONCE
Qty: 1 TABLET | Refills: 0 | Status: SHIPPED | OUTPATIENT
Start: 2024-08-02 | End: 2024-08-02

## 2024-08-02 NOTE — PROGRESS NOTES
Subjective   Henrry Glover is a 64 y.o. male.     Chief Complaint   Patient presents with    tongue discoloration        History of Present Illness   Patient presents with c/o tongue discoloration. This is a redcurrant issue. He was prescribed nystatin on 7/22/2024 and reports that he is still taking this. He denies any ulcers in his mouth just white tongue. He denies any recent antibiotic or steroid use. He does wear partial dentures and I stressed that he needs to clean these daily.     The following portions of the patient's history were reviewed and updated as appropriate: allergies, current medications, past family history, past medical history, past social history, past surgical history and problem list.    Review of Systems   Constitutional:  Negative for chills, fatigue and fever.   HENT:  Negative for drooling, mouth sores, sinus pressure, tinnitus and trouble swallowing.         White color to tongue   Respiratory:  Negative for cough, chest tightness, shortness of breath and wheezing.    Cardiovascular:  Negative for chest pain, palpitations and leg swelling.   Neurological:  Negative for dizziness and headache.   Hematological: Negative.    Psychiatric/Behavioral: Negative.  Negative for sleep disturbance.        Objective   Physical Exam  Vitals and nursing note reviewed.   Constitutional:       Appearance: He is well-developed.   HENT:      Head: Normocephalic and atraumatic.      Mouth/Throat:      Lips: Pink.      Mouth: Mucous membranes are moist.        Comments: White coating to area indicated on tongue.   Eyes:      Conjunctiva/sclera: Conjunctivae normal.      Pupils: Pupils are equal, round, and reactive to light.   Cardiovascular:      Rate and Rhythm: Normal rate and regular rhythm.      Heart sounds: Normal heart sounds. No murmur heard.  Pulmonary:      Effort: Pulmonary effort is normal.      Breath sounds: Normal breath sounds.   Neurological:      Mental Status: He is alert and oriented  to person, place, and time.   Psychiatric:         Behavior: Behavior normal.         Thought Content: Thought content normal.         Judgment: Judgment normal.         Vitals:    08/02/24 1528   BP: 122/84   Pulse: 71   Resp: 18   SpO2: 96%     Body mass index is 24.82 kg/m².      Procedures    Assessment & Plan   Problems Addressed this Visit    None  Visit Diagnoses       Stomatitis    -  Primary    Relevant Medications    fluconazole (Diflucan) 150 MG tablet    nystatin (MYCOSTATIN) 100,000 unit/mL suspension          Diagnoses         Codes Comments    Stomatitis    -  Primary ICD-10-CM: K12.1  ICD-9-CM: 528.00           Diflucan 150mg 1 p.o X 1 dose  Nystatin oral suspension QID         Return if symptoms worsen or fail to improve.  Answers submitted by the patient for this visit:  Primary Reason for Visit (Submitted on 8/2/2024)  What is the primary reason for your visit?: Other  Other (Submitted on 8/2/2024)  Please describe your symptoms.: Taste buds on tongue are white have been white for over a week now also creating a nasty taste in my mouth.  Have you had these symptoms before?: Yes  How long have you been having these symptoms?: 5-7 days  Please list any medications you are currently taking for this condition.: Nystatin

## 2024-08-07 ENCOUNTER — TELEPHONE (OUTPATIENT)
Dept: FAMILY MEDICINE CLINIC | Facility: CLINIC | Age: 64
End: 2024-08-07

## 2024-08-07 NOTE — TELEPHONE ENCOUNTER
Caller: Henrry Glover    Relationship: Self    Best call back number: [phone]     Which medication are you concerned about: NYSTATIN     Who prescribed you this medication: BARBARA ANDERSON    What are your concerns: PRESCRIPTION STATES TAKE 1ML BY MOUTH, BUT ALL PREVIOUS PRESCRIPTIONS WERE FOR 5ML. PATIENT IS UNSURE WHAT DOSE HE'S SUPPOSED TO BE TAKING.

## 2024-08-07 NOTE — TELEPHONE ENCOUNTER
Spoke with patient and he stated that a previous script sent in for this medication had him swishing and spitting 5ml by mouth per day and was wondering if he should do this with this script as well. Patient stated that 1ml is not enough to get his mouth wet so he has been doing more than that with the last script that Dr. Patrick sent in and is running out of medication too soon and the pharmacy is telling him that it is too soon to refill. Last script was sent in on 7/22/24 and also had directions to swish and spit 1ml by mouth 4 times daily.

## 2024-08-16 ENCOUNTER — TELEPHONE (OUTPATIENT)
Dept: FAMILY MEDICINE CLINIC | Facility: CLINIC | Age: 64
End: 2024-08-16

## 2024-08-16 ENCOUNTER — TRANSCRIBE ORDERS (OUTPATIENT)
Dept: ADMINISTRATIVE | Facility: HOSPITAL | Age: 64
End: 2024-08-16
Payer: MEDICARE

## 2024-08-16 DIAGNOSIS — D3A.8 NEUROENDOCRINE TUMOR: Primary | ICD-10-CM

## 2024-08-19 ENCOUNTER — OFFICE VISIT (OUTPATIENT)
Dept: FAMILY MEDICINE CLINIC | Facility: CLINIC | Age: 64
End: 2024-08-19
Payer: MEDICARE

## 2024-08-19 VITALS
BODY MASS INDEX: 24.48 KG/M2 | DIASTOLIC BLOOD PRESSURE: 68 MMHG | RESPIRATION RATE: 16 BRPM | WEIGHT: 171 LBS | HEIGHT: 70 IN | SYSTOLIC BLOOD PRESSURE: 120 MMHG | OXYGEN SATURATION: 98 % | HEART RATE: 60 BPM

## 2024-08-19 DIAGNOSIS — J30.9 ALLERGIC RHINITIS, UNSPECIFIED SEASONALITY, UNSPECIFIED TRIGGER: ICD-10-CM

## 2024-08-19 DIAGNOSIS — B37.89 YEAST PHARYNGITIS: Primary | ICD-10-CM

## 2024-08-19 DIAGNOSIS — J02.8 YEAST PHARYNGITIS: Primary | ICD-10-CM

## 2024-08-19 PROCEDURE — 3078F DIAST BP <80 MM HG: CPT | Performed by: FAMILY MEDICINE

## 2024-08-19 PROCEDURE — 1126F AMNT PAIN NOTED NONE PRSNT: CPT | Performed by: FAMILY MEDICINE

## 2024-08-19 PROCEDURE — 99214 OFFICE O/P EST MOD 30 MIN: CPT | Performed by: FAMILY MEDICINE

## 2024-08-19 PROCEDURE — 3074F SYST BP LT 130 MM HG: CPT | Performed by: FAMILY MEDICINE

## 2024-08-19 RX ORDER — FLUCONAZOLE 150 MG/1
150 TABLET ORAL
COMMUNITY
Start: 2024-08-02

## 2024-08-19 NOTE — PROGRESS NOTES
Assessment & Plan  1. Oral health.  The patient's tongue appears normal with no visible signs of yeast infection. The previously observed white coating is no longer present. The papillae are slightly prominent but not indicative of an ongoing infection. Continued oral hygiene, including cleaning the CPAP mask daily, is recommended to maintain oral health.    2. Sinus issues.  He experiences congestion, particularly when exposed to different temperatures or spending extended periods outside. He currently takes Montelukast (Singulair) and a generic brand of Flonase. It was recommended to switch from Allegra to either Claritin or Zyrtec to manage symptoms better due to potential tachyphylaxis. The use of Astepro was discussed, but he reported it as having an unpleasant taste. Continued use of nasal steroids is advised.        Patient was given instructions and counseling regarding his condition or for health maintenance advice. Please see specific information pulled into the AVS if appropriate.          Henrry is a 64 y.o. being seen today for  Thrush   HISTORY    HPIAnswers submitted by the patient for this visit:  Primary Reason for Visit (Submitted on 8/19/2024)  What is the primary reason for your visit?: Other  Other (Submitted on 8/19/2024)  Please describe your symptoms.: Tongue, discolored / white taste bud.  Have you had these symptoms before?: Yes  How long have you been having these symptoms?: Greater than 2 weeks  Please list any medications you are currently taking for this condition.: Nystatin    History of Present Illness  The patient presents for evaluation of multiple medical concerns.    He reports an improvement in his condition over the weekend, following the use of prescribed medication for approximately 1.5 weeks. He had previously noticed a white substance on his tongue, which has since disappeared. He uses a mouthpiece and has recently started using a CPAP machine. He was unsure if the  CPAP or his partial denture was causing discomfort, but he has been diligent in cleaning both his teeth and the partial denture every morning. He also cleans his mask daily upon waking. He notes a strange sensation when his tongue comes into contact with his partial denture.    He is seeking relief from his sinus issues. He receives allergy injections and takes montelukast (Singulair) and a [employer] brank antihistamine, the latter of which he has been using for about a year. He experiences congestion after spending time outdoors, but does not have symptoms of sneezing or watery eyes. He also uses Flonase nasal spray. A friend recommended Astepro, which he tried but found it left an unpleasant taste in his mouth that persisted throughout the day.  Social History  He  reports that he is a non-smoker but has been exposed to tobacco smoke. The exposure started about 53 years ago. He has been exposed to an average 0.3 packs per day for the past 10 years. He has never used smokeless tobacco. He reports that he does not drink alcohol and does not use drugs.  EXAM DATA    Vital Signs        BP Readings from Last 1 Encounters:   08/19/24 120/68     Wt Readings from Last 3 Encounters:   08/19/24 77.6 kg (171 lb)   08/02/24 78.5 kg (173 lb)   07/11/24 78.5 kg (173 lb)   Body mass index is 24.54 kg/m².  Physical Exam  Vitals reviewed.   Constitutional:       Appearance: Normal appearance.   HENT:      Mouth/Throat:      Comments: Mildly enlarged papillae of the tongue. No white plaques, erythema or lesions.   Cardiovascular:      Rate and Rhythm: Normal rate and regular rhythm.      Heart sounds: No murmur heard.  Pulmonary:      Effort: Pulmonary effort is normal.      Breath sounds: Normal breath sounds. No wheezing.   Neurological:      Mental Status: He is alert and oriented to person, place, and time.   Psychiatric:         Mood and Affect: Mood normal.         Behavior: Behavior normal.         Thought Content: Thought  content normal.         Judgment: Judgment normal.             Patient or patient representative verbalized consent for the use of Ambient Listening during the visit with  Henny Patrick MD for chart documentation. 8/19/2024  08:50 EDT

## 2024-08-26 DIAGNOSIS — F41.1 GAD (GENERALIZED ANXIETY DISORDER): ICD-10-CM

## 2024-08-26 RX ORDER — CITALOPRAM HYDROBROMIDE 10 MG/1
10 TABLET ORAL DAILY
Qty: 30 TABLET | Refills: 0 | Status: SHIPPED | OUTPATIENT
Start: 2024-08-26

## 2024-09-06 ENCOUNTER — OFFICE VISIT (OUTPATIENT)
Dept: FAMILY MEDICINE CLINIC | Facility: CLINIC | Age: 64
End: 2024-09-06
Payer: MEDICARE

## 2024-09-06 VITALS
HEART RATE: 64 BPM | HEIGHT: 70 IN | DIASTOLIC BLOOD PRESSURE: 82 MMHG | WEIGHT: 170 LBS | RESPIRATION RATE: 18 BRPM | BODY MASS INDEX: 24.34 KG/M2 | OXYGEN SATURATION: 97 % | SYSTOLIC BLOOD PRESSURE: 136 MMHG

## 2024-09-06 DIAGNOSIS — M54.6 CHRONIC LEFT-SIDED THORACIC BACK PAIN: Primary | ICD-10-CM

## 2024-09-06 DIAGNOSIS — G89.29 CHRONIC LEFT-SIDED THORACIC BACK PAIN: Primary | ICD-10-CM

## 2024-09-06 PROCEDURE — 3075F SYST BP GE 130 - 139MM HG: CPT | Performed by: NURSE PRACTITIONER

## 2024-09-06 PROCEDURE — 99213 OFFICE O/P EST LOW 20 MIN: CPT | Performed by: NURSE PRACTITIONER

## 2024-09-06 PROCEDURE — 1160F RVW MEDS BY RX/DR IN RCRD: CPT | Performed by: NURSE PRACTITIONER

## 2024-09-06 PROCEDURE — 3079F DIAST BP 80-89 MM HG: CPT | Performed by: NURSE PRACTITIONER

## 2024-09-06 PROCEDURE — 1159F MED LIST DOCD IN RCRD: CPT | Performed by: NURSE PRACTITIONER

## 2024-09-06 PROCEDURE — 1126F AMNT PAIN NOTED NONE PRSNT: CPT | Performed by: NURSE PRACTITIONER

## 2024-09-06 NOTE — PROGRESS NOTES
Subjective   Henrry Glover is a 64 y.o. male.     Chief Complaint   Patient presents with    Back Pain     L side of lower back. Patient states that he feels pressure in his back when he gets up in the morning that usually subsides after getting up and moving around. Patient states that lately the pressure has been coming back periodically during the day        History of Present Illness   Patient presents with c/o pressure in his upper back when he gets up in the morning. He reports that it usually subsides after getting up and moving around. He reports that lately the pressure has been coming back periodically during the day. He reports that the pressure has been there for about 2 years, but it occurring throughout the day. He reports pressure is only present with standing, but not with sitting or lying down. He has not taken anything for this. He describes sensation of someone sitting on his back and not being painful. Patient has CT of chest on 9/20/24 scheduled.     The following portions of the patient's history were reviewed and updated as appropriate: allergies, current medications, past family history, past medical history, past social history, past surgical history and problem list.    Review of Systems   Constitutional:  Negative for chills, fatigue and fever.   Respiratory:  Negative for cough, chest tightness, shortness of breath and wheezing.    Cardiovascular:  Negative for chest pain, palpitations and leg swelling.   Musculoskeletal:  Positive for back pain. Negative for arthralgias, gait problem, joint swelling, myalgias, neck pain and neck stiffness.   Skin:  Negative for dry skin.   Neurological:  Negative for dizziness, weakness and headache.   Psychiatric/Behavioral: Negative.         Objective   Physical Exam  Vitals and nursing note reviewed.   Constitutional:       Appearance: He is well-developed.   HENT:      Head: Normocephalic and atraumatic.   Eyes:      Conjunctiva/sclera: Conjunctivae  normal.      Pupils: Pupils are equal, round, and reactive to light.   Cardiovascular:      Rate and Rhythm: Normal rate and regular rhythm.      Heart sounds: Normal heart sounds. No murmur heard.  Pulmonary:      Effort: Pulmonary effort is normal.      Breath sounds: Normal breath sounds.   Musculoskeletal:      Thoracic back: Normal. No edema, signs of trauma, lacerations or spasms. Normal range of motion.        Back:       Comments: Non tender with palpation. No masses or abnormalities noted   Neurological:      Mental Status: He is alert and oriented to person, place, and time.   Psychiatric:         Behavior: Behavior normal.         Thought Content: Thought content normal.         Judgment: Judgment normal.         Vitals:    09/06/24 1348   BP: 136/82   Pulse: 64   Resp: 18   SpO2: 97%     Body mass index is 24.39 kg/m².      Procedures    Assessment & Plan   Problems Addressed this Visit    None  Visit Diagnoses       Chronic left-sided thoracic back pain    -  Primary          Diagnoses         Codes Comments    Chronic left-sided thoracic back pain    -  Primary ICD-10-CM: M54.6, G89.29  ICD-9-CM: 724.1, 338.29           Heat to site as needed.  Ibuprofen over the counter as directed as needed.            Return if symptoms worsen or fail to improve.

## 2024-09-20 ENCOUNTER — HOSPITAL ENCOUNTER (OUTPATIENT)
Dept: CT IMAGING | Facility: HOSPITAL | Age: 64
Discharge: HOME | End: 2024-09-20
Payer: MEDICARE

## 2024-09-20 DIAGNOSIS — D3A.8 NEUROENDOCRINE TUMOR: ICD-10-CM

## 2024-09-20 DIAGNOSIS — R91.1 SOLITARY PULMONARY NODULE: ICD-10-CM

## 2024-09-20 PROCEDURE — 74178 CT ABD&PLV WO CNTR FLWD CNTR: CPT

## 2024-09-20 PROCEDURE — 25510000001 IOPAMIDOL 61 % SOLUTION: Performed by: SURGERY

## 2024-09-20 PROCEDURE — 71250 CT THORAX DX C-: CPT

## 2024-09-20 RX ORDER — IOPAMIDOL 612 MG/ML
100 INJECTION, SOLUTION INTRAVASCULAR
Status: COMPLETED | OUTPATIENT
Start: 2024-09-20 | End: 2024-09-20

## 2024-09-20 RX ADMIN — IOPAMIDOL 85 ML: 612 INJECTION, SOLUTION INTRAVENOUS at 10:32

## 2024-09-24 DIAGNOSIS — F41.9 ANXIETY: ICD-10-CM

## 2024-09-24 DIAGNOSIS — F41.1 GAD (GENERALIZED ANXIETY DISORDER): ICD-10-CM

## 2024-09-24 RX ORDER — CITALOPRAM HYDROBROMIDE 10 MG/1
10 TABLET ORAL DAILY
Qty: 30 TABLET | Refills: 0 | Status: SHIPPED | OUTPATIENT
Start: 2024-09-24

## 2024-09-25 RX ORDER — LORAZEPAM 0.5 MG/1
TABLET ORAL
Qty: 25 TABLET | Refills: 0 | Status: SHIPPED | OUTPATIENT
Start: 2024-09-25

## 2024-09-27 DIAGNOSIS — F41.9 ANXIETY: ICD-10-CM

## 2024-09-27 RX ORDER — LORAZEPAM 0.5 MG/1
0.5 TABLET ORAL DAILY PRN
Qty: 25 TABLET | Refills: 0 | Status: CANCELLED | OUTPATIENT
Start: 2024-09-27

## 2024-10-01 DIAGNOSIS — F41.9 ANXIETY: ICD-10-CM

## 2024-10-01 RX ORDER — LORAZEPAM 0.5 MG/1
0.5 TABLET ORAL EVERY 6 HOURS PRN
Qty: 25 TABLET | Refills: 0 | Status: CANCELLED | OUTPATIENT
Start: 2024-10-01

## 2024-10-02 DIAGNOSIS — F41.9 ANXIETY: ICD-10-CM

## 2024-10-02 NOTE — TELEPHONE ENCOUNTER
Pharmacy Name:      [id number] [place], [place] - [address] AT [facility] [phone] [facility] [phone] [facility] (Pharmacy)       Pharmacy representative phone number: [phone]    What medication are you calling in regards to: LORazepam (ATIVAN) 0.5 MG tablet     What question does the pharmacy have: IN ORDER FOR THIS TO BE REFILLED, THE FREQUENCY NEEDS TO BE UPDATED. THEY NEED TO KNOW HOW MANY THE PATIENT SHOULD TAKE A DAY. PLEASE ADVISE.

## 2024-10-03 DIAGNOSIS — F41.9 ANXIETY: ICD-10-CM

## 2024-10-03 RX ORDER — LORAZEPAM 0.5 MG/1
0.5 TABLET ORAL DAILY PRN
Qty: 25 TABLET | Refills: 0 | Status: SHIPPED | OUTPATIENT
Start: 2024-10-03

## 2024-10-03 RX ORDER — LORAZEPAM 0.5 MG/1
0.5 TABLET ORAL EVERY 8 HOURS PRN
Qty: 25 TABLET | OUTPATIENT
Start: 2024-10-03

## 2024-10-03 NOTE — TELEPHONE ENCOUNTER
Sig changed appropriately, please advise and sign.    LAST VISIT - 08/19/24  NEXT VISIT - not scheduled

## 2024-10-25 DIAGNOSIS — F41.1 GAD (GENERALIZED ANXIETY DISORDER): ICD-10-CM

## 2024-10-25 RX ORDER — CITALOPRAM HYDROBROMIDE 10 MG/1
10 TABLET ORAL DAILY
Qty: 30 TABLET | Refills: 0 | Status: SHIPPED | OUTPATIENT
Start: 2024-10-25

## 2024-10-25 NOTE — TELEPHONE ENCOUNTER
LAST REFILL - 09/24/24  LAST VISIT - 07/11/24  NEXT VISIT - 1/16/25 (cancelled due to [employer] offboarding)    Routing to covering provider Dr. Keith.

## 2024-11-14 ENCOUNTER — HOSPITAL ENCOUNTER (EMERGENCY)
Facility: HOSPITAL | Age: 64
Discharge: HOME | End: 2024-11-14
Attending: EMERGENCY MEDICINE
Payer: MEDICARE

## 2024-11-14 ENCOUNTER — APPOINTMENT (OUTPATIENT)
Dept: GENERAL RADIOLOGY | Facility: HOSPITAL | Age: 64
End: 2024-11-14
Payer: MEDICARE

## 2024-11-14 VITALS
HEART RATE: 62 BPM | TEMPERATURE: 98.8 F | BODY MASS INDEX: 24.34 KG/M2 | RESPIRATION RATE: 17 BRPM | SYSTOLIC BLOOD PRESSURE: 121 MMHG | WEIGHT: 170 LBS | DIASTOLIC BLOOD PRESSURE: 79 MMHG | OXYGEN SATURATION: 99 % | HEIGHT: 70 IN

## 2024-11-14 DIAGNOSIS — R00.2 PALPITATIONS: Primary | ICD-10-CM

## 2024-11-14 LAB
ALBUMIN SERPL-MCNC: 4.3 G/DL (ref 3.5–5.2)
ALBUMIN/GLOB SERPL: 1.5 G/DL
ALP SERPL-CCNC: 91 U/L (ref 39–117)
ALT SERPL W P-5'-P-CCNC: 19 U/L (ref 1–41)
ANION GAP SERPL CALCULATED.3IONS-SCNC: 11 MMOL/L (ref 5–15)
AST SERPL-CCNC: 18 U/L (ref 1–40)
BASOPHILS # BLD AUTO: 0.08 10*3/MM3 (ref 0–0.2)
BASOPHILS NFR BLD AUTO: 0.6 % (ref 0–1.5)
BILIRUB SERPL-MCNC: 1.1 MG/DL (ref 0–1.2)
BUN SERPL-MCNC: 12 MG/DL (ref 8–23)
BUN/CREAT SERPL: 15.6 (ref 7–25)
CALCIUM SPEC-SCNC: 10 MG/DL (ref 8.6–10.5)
CHLORIDE SERPL-SCNC: 104 MMOL/L (ref 98–107)
CO2 SERPL-SCNC: 26 MMOL/L (ref 22–29)
CREAT SERPL-MCNC: 0.77 MG/DL (ref 0.76–1.27)
DEPRECATED RDW RBC AUTO: 42.2 FL (ref 37–54)
EGFRCR SERPLBLD CKD-EPI 2021: 98.7 ML/MIN/1.73
EOSINOPHIL # BLD AUTO: 0.11 10*3/MM3 (ref 0–0.4)
EOSINOPHIL NFR BLD AUTO: 0.9 % (ref 0.3–6.2)
ERYTHROCYTE [DISTWIDTH] IN BLOOD BY AUTOMATED COUNT: 13.6 % (ref 12.3–15.4)
GEN 5 2HR TROPONIN T REFLEX: 9 NG/L
GLOBULIN UR ELPH-MCNC: 2.9 GM/DL
GLUCOSE SERPL-MCNC: 99 MG/DL (ref 65–99)
HCT VFR BLD AUTO: 29.2 % (ref 37.5–51)
HGB BLD-MCNC: 10.5 G/DL (ref 13–17.7)
HOLD SPECIMEN: NORMAL
HOLD SPECIMEN: NORMAL
IMM GRANULOCYTES # BLD AUTO: 0.03 10*3/MM3 (ref 0–0.05)
IMM GRANULOCYTES NFR BLD AUTO: 0.2 % (ref 0–0.5)
LYMPHOCYTES # BLD AUTO: 2.82 10*3/MM3 (ref 0.7–3.1)
LYMPHOCYTES NFR BLD AUTO: 22.4 % (ref 19.6–45.3)
MCH RBC QN AUTO: 31 PG (ref 26.6–33)
MCHC RBC AUTO-ENTMCNC: 36 G/DL (ref 31.5–35.7)
MCV RBC AUTO: 86.1 FL (ref 79–97)
MONOCYTES # BLD AUTO: 1.13 10*3/MM3 (ref 0.1–0.9)
MONOCYTES NFR BLD AUTO: 9 % (ref 5–12)
NEUTROPHILS NFR BLD AUTO: 66.9 % (ref 42.7–76)
NEUTROPHILS NFR BLD AUTO: 8.44 10*3/MM3 (ref 1.7–7)
PLATELET # BLD AUTO: 241 10*3/MM3 (ref 140–450)
PMV BLD AUTO: 12.3 FL (ref 6–12)
POTASSIUM SERPL-SCNC: 4.5 MMOL/L (ref 3.5–5.2)
PROT SERPL-MCNC: 7.2 G/DL (ref 6–8.5)
RBC # BLD AUTO: 3.39 10*6/MM3 (ref 4.14–5.8)
SODIUM SERPL-SCNC: 141 MMOL/L (ref 136–145)
TROPONIN T DELTA: -1 NG/L
TROPONIN T SERPL HS-MCNC: 10 NG/L
WBC NRBC COR # BLD AUTO: 12.61 10*3/MM3 (ref 3.4–10.8)
WHOLE BLOOD HOLD COAG: NORMAL
WHOLE BLOOD HOLD SPECIMEN: NORMAL

## 2024-11-14 PROCEDURE — 36415 COLL VENOUS BLD VENIPUNCTURE: CPT

## 2024-11-14 PROCEDURE — 80053 COMPREHEN METABOLIC PANEL: CPT | Performed by: NURSE PRACTITIONER

## 2024-11-14 PROCEDURE — 71046 X-RAY EXAM CHEST 2 VIEWS: CPT

## 2024-11-14 PROCEDURE — 99284 EMERGENCY DEPT VISIT MOD MDM: CPT

## 2024-11-14 PROCEDURE — 93005 ELECTROCARDIOGRAM TRACING: CPT | Performed by: EMERGENCY MEDICINE

## 2024-11-14 PROCEDURE — 84484 ASSAY OF TROPONIN QUANT: CPT | Performed by: NURSE PRACTITIONER

## 2024-11-14 PROCEDURE — 93010 ELECTROCARDIOGRAM REPORT: CPT | Performed by: INTERNAL MEDICINE

## 2024-11-14 PROCEDURE — 99284 EMERGENCY DEPT VISIT MOD MDM: CPT | Performed by: EMERGENCY MEDICINE

## 2024-11-14 PROCEDURE — 85025 COMPLETE CBC W/AUTO DIFF WBC: CPT | Performed by: NURSE PRACTITIONER

## 2024-11-14 RX ORDER — SODIUM CHLORIDE 0.9 % (FLUSH) 0.9 %
10 SYRINGE (ML) INJECTION AS NEEDED
Status: DISCONTINUED | OUTPATIENT
Start: 2024-11-14 | End: 2024-11-14 | Stop reason: HOSPADM

## 2024-11-14 NOTE — FSED PROVIDER NOTE
Subjective   History of Present Illness  64 yr old male presents with 1 week history of palpitations.  States he has a history of vertigo so he does sometimes have some dizziness, but does not think he has had any with the palpitations.  Denies CP, SOA, no N/V/D.  States he does get anxious when the palpitations occurs and he has a history of anxiety.          Review of Systems   Constitutional: Negative.    Respiratory:  Negative for cough, shortness of breath, wheezing and stridor.    Cardiovascular:  Positive for palpitations. Negative for chest pain and leg swelling.   Gastrointestinal:  Negative for abdominal pain, nausea and vomiting.   Genitourinary: Negative.    Neurological:  Negative for dizziness, weakness, light-headedness, numbness and headaches.       Past Medical History:   Diagnosis Date    Acid reflux     Allergic     Anxiety     Arthritis     Benign essential HTN 04/21/2017    BPH (benign prostatic hyperplasia)     Depression     DVT (deep venous thrombosis)     Eczema     Heart murmur     Hemorrhoid     Hepatitis C     history cleared    History of pneumonia     History of transfusion     Sickle cell anemia     Sinus problem     Sleep apnea     cpap    Tinnitus        Allergies   Allergen Reactions    Levofloxacin Swelling    Penicillins Swelling    Oxycodone-Acetaminophen GI Intolerance    Sulfamethoxazole-Trimethoprim Rash       Past Surgical History:   Procedure Laterality Date    CHOLECYSTECTOMY      COLONOSCOPY  2012    ENDOSCOPY  02/22/2005    HEMORRHOIDECTOMY N/A 6/23/2016    Procedure: HEMORRHOIDECTOMY;  Surgeon: Ct Recio MD;  Location: [facility];  Service:     LIPOMA EXCISION      LYMPHADENECTOMY      WHIPPLE PROCEDURE  07/26/2019    UL    WRIST SURGERY Right     fractured and had plate put in       Family History   Problem Relation Age of Onset    Heart attack Maternal Uncle     Arthritis Mother        Social History     Socioeconomic History    Marital status:     Tobacco Use    Smoking status: Passive Smoke Exposure - Never Smoker     Passive exposure: Yes    Smokeless tobacco: Never   Vaping Use    Vaping status: Never Used   Substance and Sexual Activity    Alcohol use: No    Drug use: No    Sexual activity: Defer           Objective   Physical Exam  Vitals and nursing note reviewed.   Constitutional:       Appearance: Normal appearance.   HENT:      Right Ear: Hearing, tympanic membrane, ear canal and external ear normal.      Left Ear: Hearing, ear canal and external ear normal.      Ears:      Comments: Left TM with some cloudiness.     Nose: Mucosal edema and rhinorrhea present.      Right Sinus: No maxillary sinus tenderness or frontal sinus tenderness.      Left Sinus: No maxillary sinus tenderness or frontal sinus tenderness.      Mouth/Throat:      Lips: Pink.      Mouth: Mucous membranes are moist.      Pharynx: Postnasal drip present.   Cardiovascular:      Rate and Rhythm: Normal rate.   Pulmonary:      Effort: Pulmonary effort is normal.      Breath sounds: Normal breath sounds and air entry.   Skin:     General: Skin is warm and dry.      Capillary Refill: Capillary refill takes less than 2 seconds.   Neurological:      Mental Status: He is alert.         Procedures           ED Course  ED Course as of 11/14/24 1752   Thu Nov 14, 2024   1455 EKG shows sinus rhythm.  The rate is 61 bpm.  There is no acute ST segment or T wave changes noted.  There is evidence for left anterior fascicular block.  No ectopy. [KZ]      ED Course User Index  [KZ] Josr Lau MD      Lab work within normal limits except for elevated WBC which is lower than his last lab work 6 months ago and he tends to run high in looking at previous lab work.  Pt to follow up with his cardiologist.                                     Medical Decision Making    Final diagnoses:   Palpitations       ED Disposition  ED Disposition       ED Disposition   Discharge    Condition   Stable     Comment   --               Henny Patrick MD  [address]  [place] [id number] [phone]      As needed, If symptoms worsen         Medication List        Changed      triamcinolone 0.025 % cream  Commonly known as: KENALOG  APPLY TOPICALLY TO THE AFFECTED AREA THREE TIMES DAILY  What changed:   how much to take  additional instructions

## 2024-11-15 LAB
QT INTERVAL: 419 MS
QTC INTERVAL: 422 MS

## 2024-11-21 DIAGNOSIS — F41.1 GAD (GENERALIZED ANXIETY DISORDER): ICD-10-CM

## 2024-11-21 RX ORDER — CITALOPRAM HYDROBROMIDE 10 MG/1
10 TABLET ORAL DAILY
Qty: 90 TABLET | Refills: 1 | Status: SHIPPED | OUTPATIENT
Start: 2024-11-21

## 2024-12-16 DIAGNOSIS — F41.9 ANXIETY: ICD-10-CM

## 2024-12-16 RX ORDER — LORAZEPAM 0.5 MG/1
0.5 TABLET ORAL DAILY PRN
Qty: 25 TABLET | Refills: 0 | Status: SHIPPED | OUTPATIENT
Start: 2024-12-16

## 2024-12-22 DIAGNOSIS — I10 BENIGN ESSENTIAL HTN: ICD-10-CM

## 2024-12-23 RX ORDER — AMLODIPINE BESYLATE 5 MG/1
5 TABLET ORAL DAILY
Qty: 90 TABLET | Refills: 1 | Status: SHIPPED | OUTPATIENT
Start: 2024-12-23

## 2025-01-02 ENCOUNTER — OFFICE VISIT (OUTPATIENT)
Dept: SURGERY | Facility: CLINIC | Age: 65
End: 2025-01-02
Payer: MEDICARE

## 2025-01-02 VITALS
WEIGHT: 170.6 LBS | HEART RATE: 66 BPM | OXYGEN SATURATION: 97 % | BODY MASS INDEX: 24.48 KG/M2 | DIASTOLIC BLOOD PRESSURE: 75 MMHG | SYSTOLIC BLOOD PRESSURE: 123 MMHG

## 2025-01-02 DIAGNOSIS — R91.1 LUNG NODULE: Primary | ICD-10-CM

## 2025-01-02 PROCEDURE — 99214 OFFICE O/P EST MOD 30 MIN: CPT | Performed by: NURSE PRACTITIONER

## 2025-01-02 PROCEDURE — 3078F DIAST BP <80 MM HG: CPT | Performed by: NURSE PRACTITIONER

## 2025-01-02 PROCEDURE — 3074F SYST BP LT 130 MM HG: CPT | Performed by: NURSE PRACTITIONER

## 2025-01-07 NOTE — PROGRESS NOTES
"Chief Complaint  F/U, lung nodules, hx Well-differentiated neuroendocrine tumor of pancreas     Subjective        Henrry Glover presents to [facility] THORACIC SURGERY  History of Present Illness  Mr. Glover is a very pleasant 64-year-old gentleman who presents today in follow-up for continued surveillance of his pulmonary nodules.  He has a history of well-differentiated neuroendocrine tumor status post Whipple procedure in 2019.  He is a former smoker.  He remains very active in his daily life and continues to work.  He has no new pulmonary symptoms.  He presents today feeling well.      Objective   Vital Signs:  /75 (BP Location: Left arm, Patient Position: Sitting)   Pulse 66   Wt 77.4 kg (170 lb 9.6 oz)   SpO2 97%   BMI 24.48 kg/m²   Estimated body mass index is 24.48 kg/m² as calculated from the following:    Height as of 11/14/24: 177.8 cm (70\").    Weight as of this encounter: 77.4 kg (170 lb 9.6 oz).       BMI is within normal parameters. No other follow-up for BMI required.      Physical Exam  Constitutional:       General: He is not in acute distress.     Appearance: Normal appearance.   HENT:      Head: Normocephalic and atraumatic.      Nose: Nose normal.   Cardiovascular:      Rate and Rhythm: Normal rate.      Pulses: Normal pulses.   Pulmonary:      Effort: Pulmonary effort is normal.   Musculoskeletal:         General: Normal range of motion.      Cervical back: Normal range of motion.   Skin:     General: Skin is warm and dry.   Neurological:      General: No focal deficit present.      Mental Status: He is alert and oriented to person, place, and time. Mental status is at baseline.   Psychiatric:         Mood and Affect: Mood normal.         Behavior: Behavior normal.         Thought Content: Thought content normal.         Judgment: Judgment normal.        Result Review :    CT chest 9/20/2024: The right lung base there is a nodular opacity and pleural thickening " without change.  There are 2 small sub-5 mm nodules in the right upper lobe, that were not evident previously.  No mediastinal or hilar lymphadenopathy.  No pleural or pericardial effusion.           Assessment and Plan   Diagnoses and all orders for this visit:    1. Lung nodule (Primary)  -     CT Chest Without Contrast; Future        Mr. Glover is a pleasant 64-year-old gentleman who presents today with a interval CT scan of the chest after he was found to have multiple micronodules during an episode of chest pain back in November 2023.  There are 2 small sub-5 mm nodules in the right upper lobe that were not evident previously.  We will continue to follow this with subsequent CT chest in 6 months and have him follow-up with us to review the results.  These lesions are currently too small to characterize with PET scan or biopsy.  This was discussed with him today and he agrees to the plan.         I spent 31 minutes caring for Henrry on this date of service. This time includes time spent by me in the following activities:preparing for the visit, reviewing tests, obtaining and/or reviewing a separately obtained history, performing a medically appropriate examination and/or evaluation , counseling and educating the patient/family/caregiver, ordering medications, tests, or procedures, referring and communicating with other health care professionals , documenting information in the medical record, independently interpreting results and communicating that information with the patient/family/caregiver, and care coordination  Follow Up   Return in about 6 months (around 7/2/2025) for Next scheduled follow up.  Patient was given instructions and counseling regarding his condition or for health maintenance advice. Please see specific information pulled into the AVS if appropriate.

## 2025-01-27 ENCOUNTER — OFFICE VISIT (OUTPATIENT)
Dept: FAMILY MEDICINE CLINIC | Facility: CLINIC | Age: 65
End: 2025-01-27
Payer: MEDICARE

## 2025-01-27 VITALS
BODY MASS INDEX: 24.48 KG/M2 | HEART RATE: 66 BPM | OXYGEN SATURATION: 96 % | RESPIRATION RATE: 16 BRPM | SYSTOLIC BLOOD PRESSURE: 110 MMHG | WEIGHT: 171 LBS | DIASTOLIC BLOOD PRESSURE: 68 MMHG | HEIGHT: 70 IN

## 2025-01-27 DIAGNOSIS — H61.22 CERUMINOSIS, LEFT: Primary | ICD-10-CM

## 2025-01-27 PROCEDURE — 3074F SYST BP LT 130 MM HG: CPT | Performed by: FAMILY MEDICINE

## 2025-01-27 PROCEDURE — 69209 REMOVE IMPACTED EAR WAX UNI: CPT | Performed by: FAMILY MEDICINE

## 2025-01-27 PROCEDURE — 3078F DIAST BP <80 MM HG: CPT | Performed by: FAMILY MEDICINE

## 2025-01-27 PROCEDURE — 1126F AMNT PAIN NOTED NONE PRSNT: CPT | Performed by: FAMILY MEDICINE

## 2025-01-27 NOTE — PROGRESS NOTES
Assessment & Plan    No follow-ups on file.  Patient was given instructions and counseling regarding his condition or for health maintenance advice. Please see specific information pulled into the AVS if appropriate.          Henrry is a 64 y.o. being seen today for  Earache (Left ear)   HISTORY    Earache      patient was seen in the immediate care with some pain in his left ear.  He was told that he needed his ear flushed.  No pain in the right ear  Review of Systems   HENT:  Positive for ear pain.       Social History  He  reports that he has never smoked. He has been exposed to tobacco smoke. He has never used smokeless tobacco. He reports that he does not drink alcohol and does not use drugs.  EXAM DATA    Vital Signs        BP Readings from Last 1 Encounters:   01/27/25 110/68     Wt Readings from Last 3 Encounters:   01/27/25 77.6 kg (171 lb)   01/24/25 77.1 kg (170 lb)   01/02/25 77.4 kg (170 lb 9.6 oz)   Body mass index is 24.54 kg/m².  Physical Exam  Vitals reviewed.   Constitutional:       Appearance: Normal appearance. He is well-developed.   HENT:      Right Ear: Hearing, tympanic membrane, ear canal and external ear normal.      Left Ear: External ear normal.      Ears:      Comments: Patient with impacted cerumen on the left side only.  This was completely flushed out with a normal TM after that.  Neurological:      Mental Status: He is alert.   Psychiatric:         Behavior: Behavior normal.         Thought Content: Thought content normal.         Judgment: Judgment normal.       BMI is within normal parameters. No other follow-up for BMI required.

## 2025-02-28 ENCOUNTER — OFFICE VISIT (OUTPATIENT)
Dept: FAMILY MEDICINE CLINIC | Facility: CLINIC | Age: 65
End: 2025-02-28
Payer: MEDICARE

## 2025-02-28 VITALS
HEART RATE: 64 BPM | RESPIRATION RATE: 16 BRPM | HEIGHT: 70 IN | DIASTOLIC BLOOD PRESSURE: 70 MMHG | OXYGEN SATURATION: 97 % | WEIGHT: 173 LBS | SYSTOLIC BLOOD PRESSURE: 128 MMHG | BODY MASS INDEX: 24.77 KG/M2

## 2025-02-28 DIAGNOSIS — M25.562 LEFT MEDIAL KNEE PAIN: Primary | ICD-10-CM

## 2025-02-28 PROCEDURE — 3074F SYST BP LT 130 MM HG: CPT | Performed by: FAMILY MEDICINE

## 2025-02-28 PROCEDURE — 1126F AMNT PAIN NOTED NONE PRSNT: CPT | Performed by: FAMILY MEDICINE

## 2025-02-28 PROCEDURE — 99212 OFFICE O/P EST SF 10 MIN: CPT | Performed by: FAMILY MEDICINE

## 2025-02-28 PROCEDURE — 3078F DIAST BP <80 MM HG: CPT | Performed by: FAMILY MEDICINE

## 2025-02-28 NOTE — PROGRESS NOTES
Assessment & Plan  Left medial knee pain    Orders:    Ambulatory Referral to Orthopedic Surgery          Knee pain.  The patient reports significant pain in the knee, particularly when walking, without any associated swelling or recent injury. He has a history of meniscus surgery and previous cortisone injections. A referral to an orthopedic specialist will be initiated for further evaluation and potential treatment, including the possibility of another cortisone injection.    PROCEDURE  The patient received a cortisone injection in both knees following meniscal surgery several years ago.     Patient was given instructions and counseling regarding his condition or for health maintenance advice. Please see specific information pulled into the AVS if appropriate.          Henrry is a 64 y.o. being seen today for  Knee Pain (Left knee denies injury )   HISTORY      Knee pain  Symptoms are: new.   Onset was in the past 7 days.   Symptoms occur: daily.  Symptoms include: joint pain.   Pertinent negative symptoms include no abdominal pain, no anorexia, no change in stool, no chest pain, no chills, no congestion, no cough, no diaphoresis, no fatigue, no fever, no headaches, no joint swelling, no myalgias, no nausea, no neck pain, no numbness, no rash, no sore throat, no swollen glands, no dysuria, no vertigo, no visual change, no vomiting and no weakness.   Treatment and/or Medications comments include: Ibuprofen         The patient is a 64-year-old male who presents for evaluation of knee pain.    He reports persistent discomfort in his knee, which he attributes to a meniscal issue that was surgically addressed several years ago. The pain has been present for slightly over a week and is exacerbated during ambulation. He does not experience any pain upon palpation of the knee and reports no associated swelling or recent trauma to the area. He is employed in a role that requires extensive walking, which he finds  beneficial for maintaining physical fitness. He has observed that certain footwear provides relief from the pain. Following the surgical intervention over 10 years ago he received a cortisone injection in both knees, with the most recent injection administered last week.    Supplemental Information  He also reports experiencing pain in his thumb, which he suspects may be due to arthritis.       Social History  He  reports that he has never smoked. He has been exposed to tobacco smoke. He has never used smokeless tobacco. He reports that he does not drink alcohol and does not use drugs.  EXAM DATA    Vital Signs        BP Readings from Last 1 Encounters:   02/28/25 128/70     Wt Readings from Last 3 Encounters:   02/28/25 78.5 kg (173 lb)   01/27/25 77.6 kg (171 lb)   01/24/25 77.1 kg (170 lb)   Body mass index is 24.82 kg/m².  Physical Exam  Vitals reviewed.   Constitutional:       Appearance: Normal appearance. He is well-developed.   Musculoskeletal:         General: No swelling, tenderness or deformity.   Neurological:      Mental Status: He is alert.   Psychiatric:         Behavior: Behavior normal.         Thought Content: Thought content normal.         Judgment: Judgment normal.               Patient or patient representative verbalized consent for the use of Ambient Listening during the visit with  Henny Patrick MD for chart documentation. 2/28/2025  17:22 EST

## 2025-03-14 NOTE — PROGRESS NOTES
RM:________     PCP: Henny Patrick MD                            Last EKG : 24     : 1960  AGE: 64 y.o.    REASON FOR VISIT: __________________________________________    ____________________________________________________________                                                   Wt Readings from Last 3 Encounters:   25 78.5 kg (173 lb)   25 77.6 kg (171 lb)   25 77.1 kg (170 lb)      BP Readings from Last 3 Encounters:   25 128/70   25 110/68   25 129/68      WT: ____________ HT: ______ BP: __________ HR ______   02% _______               Lipid Panel          2024    15:04   Lipid Panel   Total Cholesterol 108    Triglycerides 80    HDL Cholesterol 56    VLDL Cholesterol 16    LDL Cholesterol  36    LDL/HDL Ratio 0.6

## 2025-03-17 ENCOUNTER — OFFICE VISIT (OUTPATIENT)
Dept: CARDIOLOGY | Facility: CLINIC | Age: 65
End: 2025-03-17
Payer: MEDICARE

## 2025-03-17 VITALS
WEIGHT: 172.4 LBS | HEIGHT: 70 IN | OXYGEN SATURATION: 98 % | HEART RATE: 53 BPM | BODY MASS INDEX: 24.68 KG/M2 | DIASTOLIC BLOOD PRESSURE: 70 MMHG | SYSTOLIC BLOOD PRESSURE: 114 MMHG

## 2025-03-17 DIAGNOSIS — I44.4 LAFB (LEFT ANTERIOR FASCICULAR BLOCK): ICD-10-CM

## 2025-03-17 DIAGNOSIS — D57.1 SICKLING DISORDER DUE TO HEMOGLOBIN S WITHOUT CRISIS: ICD-10-CM

## 2025-03-17 DIAGNOSIS — Z86.711 HISTORY OF PULMONARY EMBOLUS (PE): ICD-10-CM

## 2025-03-17 DIAGNOSIS — F41.9 ANXIETY: ICD-10-CM

## 2025-03-17 DIAGNOSIS — I77.89 ASCENDING AORTA ENLARGEMENT: Primary | ICD-10-CM

## 2025-03-17 DIAGNOSIS — I10 PRIMARY HYPERTENSION: ICD-10-CM

## 2025-03-17 PROCEDURE — 3078F DIAST BP <80 MM HG: CPT | Performed by: NURSE PRACTITIONER

## 2025-03-17 PROCEDURE — 3074F SYST BP LT 130 MM HG: CPT | Performed by: NURSE PRACTITIONER

## 2025-03-17 PROCEDURE — G2211 COMPLEX E/M VISIT ADD ON: HCPCS | Performed by: NURSE PRACTITIONER

## 2025-03-17 PROCEDURE — 99214 OFFICE O/P EST MOD 30 MIN: CPT | Performed by: NURSE PRACTITIONER

## 2025-03-17 RX ORDER — LORAZEPAM 0.5 MG/1
0.5 TABLET ORAL DAILY PRN
Qty: 25 TABLET | Refills: 0 | Status: SHIPPED | OUTPATIENT
Start: 2025-03-17

## 2025-03-17 NOTE — ASSESSMENT & PLAN NOTE
Hypertension is stable and controlled  Continue current treatment regimen.  Dietary sodium restriction.  Regular aerobic exercise.  Ambulatory blood pressure monitoring.  Blood pressure will be reassessed in 1 year.

## 2025-03-17 NOTE — PROGRESS NOTES
"    CARDIOLOGY        Patient Name: Henrry Glover  :1960  Age: 64 y.o.  Primary Cardiologist: Nalini Camara MD  Encounter Provider:  JENIFER Delgadillo    Date of Service: 2025      CHIEF COMPLAINT / REASON FOR OFFICE VISIT     Follow-up (HTN)      HISTORY OF PRESENT ILLNESS       HPI  Henrry Glover is a 64 y.o. male who presents today for annual assessment.     Pt has a  history significant for HTN, sickle cell anemia, pancreatic mass removed by Whipple, history of PE following cholecystectomy, no anticoagulants due to sickle cell anemia, ascending aorta dilatation.    Originally establish care with our clinic in  when he was experiencing chest pain.  Patient with echocardiogram in .  LVEF 67%.  Diastolic function normal.  Calculated RVSP 42 mmHg.  Mild pulmonary hypertension.  Mild dilation of the ascending aorta.  Treadmill stress test 2022 revealed a normal ECG stress test.  24-hour monitor 2022 revealed a normal monitor study.    Patient was CT chest 2024 which revealed an ascending thoracic aorta 4.2 cm, next CT in 1 year.    Patient presents today for routine follow up.  He notes that he has done well from a cardiac standpoint.  He walks roputinely and denies exertional symptoms.  He admits to increased anxiety.  He has not had a sickle cell crisis in over a year.  His BP has been controlled.  He is asymptomatic, he denies any episodes of chest pain, shortness of breath, palpitations, lightheadedness, swelling or fatigue.      The following portions of the patient's history were reviewed and updated as appropriate: allergies, current medications, past family history, past medical history, past social history, past surgical history and problem list.      VITAL SIGNS     Visit Vitals  /70   Pulse 53   Ht 177.8 cm (70\")   Wt 78.2 kg (172 lb 6.4 oz)   SpO2 98%   BMI 24.74 kg/m²         Wt Readings from Last 3 Encounters:   25 78.2 kg (172 lb " 6.4 oz)   02/28/25 78.5 kg (173 lb)   01/27/25 77.6 kg (171 lb)     Body mass index is 24.74 kg/m².      REVIEW OF SYSTEMS     Review of Systems   Constitutional: Negative for malaise/fatigue.   Cardiovascular:  Negative for chest pain and leg swelling.   Respiratory:  Negative for shortness of breath.    Neurological:  Negative for light-headedness.   All other systems reviewed and are negative.          PHYSICAL EXAMINATION     Constitutional:       Appearance: Normal appearance. Well-developed.   Eyes:      Conjunctiva/sclera: Conjunctivae normal.   Neck:      Vascular: No carotid bruit.   Pulmonary:      Effort: Pulmonary effort is normal.      Breath sounds: Normal breath sounds.   Cardiovascular:      Normal rate. Regular rhythm. Normal S1. Normal S2.       Murmurs: There is no murmur.      No gallop.  No click. No rub.   Edema:     Peripheral edema absent.   Musculoskeletal: Normal range of motion. Skin:     General: Skin is warm and dry.   Neurological:      Mental Status: Alert and oriented to person, place, and time.      GCS: GCS eye subscore is 4. GCS verbal subscore is 5. GCS motor subscore is 6.   Psychiatric:         Speech: Speech normal.         Behavior: Behavior normal.         Thought Content: Thought content normal.         Judgment: Judgment normal.           REVIEWED DATA     Procedures        Lipid Panel          6/17/2024    15:04   Lipid Panel   Total Cholesterol 108    Triglycerides 80    HDL Cholesterol 56    VLDL Cholesterol 16    LDL Cholesterol  36    LDL/HDL Ratio 0.6        Lab Results   Component Value Date     11/14/2024     04/05/2024    K 4.5 11/14/2024    K 4.0 04/05/2024     11/14/2024     04/05/2024    CO2 26.0 11/14/2024    CO2 24.5 04/05/2024    BUN 12 11/14/2024    BUN 12 04/05/2024    CREATININE 0.77 11/14/2024    CREATININE 0.80 04/05/2024    EGFRIFNONA 108 01/09/2020    EGFRIFNONA 85 05/23/2018    EGFRIFAFRI 130 01/09/2020    EGFRIFAFRI 103  "05/23/2018    GLUCOSE 99 11/14/2024    GLUCOSE 99 04/05/2024    CALCIUM 10.0 11/14/2024    CALCIUM 9.9 04/05/2024    ALBUMIN 4.3 11/14/2024    ALBUMIN 4.4 04/05/2024    BILITOT 1.1 11/14/2024    BILITOT 1.1 04/05/2024    AST 18 11/14/2024    AST 43 (H) 04/05/2024    ALT 19 11/14/2024    ALT 22 04/05/2024     Lab Results   Component Value Date    WBC 12.61 (H) 11/14/2024    WBC 14.7 (H) 04/22/2024    HGB 10.5 (L) 11/14/2024    HGB 10.6 (L) 04/22/2024    HCT 29.2 (L) 11/14/2024    HCT 30.3 (L) 04/22/2024    MCV 86.1 11/14/2024    MCV 88.6 04/22/2024     11/14/2024     04/22/2024     Lab Results   Component Value Date    PROBNP 63.9 11/08/2023    PROBNP 71.2 12/22/2022     Lab Results   Component Value Date    TROPONINI <0.012 04/04/2019    TROPONINT 9 11/14/2024     No results found for: \"TSH\"          ASSESSMENT & PLAN     Diagnoses and all orders for this visit:    1. Ascending aorta enlargement (Primary)  Overview:  CT chest September 2024 ascending thoracic aorta 4.2 cm    Assessment & Plan:  BP controlled  Patient gets surveillance CT images of the chest annually for lung nodules, this will also assess aorta size      2. LAFB (left anterior fascicular block)    3. Primary hypertension  Overview:  Amlodipine 5 mg/day    Assessment & Plan:  Hypertension is stable and controlled  Continue current treatment regimen.  Dietary sodium restriction.  Regular aerobic exercise.  Ambulatory blood pressure monitoring.  Blood pressure will be reassessed in 1 year.      4. History of pulmonary embolus (PE)  Overview:  in 2002 after gallbladder surgery    Assessment & Plan:  No anticoagulants secondary to sickle cell anemia      5. 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 in about 1 year (around 3/17/2026) for Dr. Camara, MUST see her, APRN last 3 years.    Future Appointments         Provider Department Center    6/5/2025 9:45 AM Madalyn Hunt, " DNP, APRN [facility]              MEDICATIONS         Discharge Medications            Accurate as of March 17, 2025  2:27 PM. If you have any questions, ask your nurse or doctor.                Changes to Medications        Instructions Start Date   triamcinolone 0.025 % cream  Commonly known as: KENALOG  What changed:   how much to take  additional instructions   APPLY TOPICALLY TO THE AFFECTED AREA THREE TIMES DAILY             Continue These Medications        Instructions Start Date   acetaminophen 325 MG tablet  Commonly known as: TYLENOL   650 mg, Oral, Every 6 Hours PRN      amLODIPine 5 MG tablet  Commonly known as: NORVASC   5 mg, Oral, Daily      cetirizine 10 MG tablet  Commonly known as: zyrTEC   10 mg, Daily      citalopram 10 MG tablet  Commonly known as: CeleXA   10 mg, Oral, Daily      famciclovir 500 MG tablet  Commonly known as: FAMVIR   TAKE 2 TABLETS BY MOUTH TWICE DAILY AS NEEDED FOR ONSET OF SYMPTOMS      fluconazole 150 MG tablet  Commonly known as: DIFLUCAN   150 mg      LORazepam 0.5 MG tablet  Commonly known as: ATIVAN   0.5 mg, Oral, Daily PRN      montelukast 10 MG tablet  Commonly known as: SINGULAIR   TAKE 1 TABLET BY MOUTH AT BEDTIME      multivitamin tablet tablet  Commonly known as: THERAGRAN   1 tablet, Daily      nystatin 100,000 unit/mL suspension  Commonly known as: MYCOSTATIN   100,000 Units, Oral, 4 Times Daily      ondansetron 4 MG tablet  Commonly known as: Zofran   4 mg, Oral, Every 8 Hours PRN      pantoprazole 40 MG EC tablet  Commonly known as: PROTONIX   40 mg, Daily      tamsulosin 0.4 MG capsule 24 hr capsule  Commonly known as: FLOMAX   Take 1 capsule by mouth 2 (Two) Times a Day.      VITAMIN C PO   1 dose, Daily                   **Dragon Disclaimer:   Much of this encounter note is an electronic transcription/translation of spoken language to printed text. The electronic translation of spoken language may permit erroneous, or  at times, nonsensical words or phrases to be inadvertently transcribed. Although I have reviewed the note for such errors, some may still exist.

## 2025-03-17 NOTE — ASSESSMENT & PLAN NOTE
BP controlled  Patient gets surveillance CT images of the chest annually for lung nodules, this will also assess aorta size

## 2025-04-06 DIAGNOSIS — K12.1 STOMATITIS: ICD-10-CM

## 2025-04-07 RX ORDER — NYSTATIN 100000 [USP'U]/ML
SUSPENSION ORAL
Qty: 60 ML | Refills: 1 | Status: SHIPPED | OUTPATIENT
Start: 2025-04-07

## 2025-05-21 DIAGNOSIS — L20.9 ATOPIC DERMATITIS, UNSPECIFIED TYPE: ICD-10-CM

## 2025-05-22 RX ORDER — TRIAMCINOLONE ACETONIDE 0.25 MG/G
CREAM TOPICAL 3 TIMES DAILY
Qty: 240 G | Refills: 0 | Status: SHIPPED | OUTPATIENT
Start: 2025-05-22

## 2025-05-27 ENCOUNTER — APPOINTMENT (OUTPATIENT)
Dept: CT IMAGING | Facility: HOSPITAL | Age: 65
End: 2025-05-27
Payer: MEDICARE

## 2025-05-27 ENCOUNTER — HOSPITAL ENCOUNTER (EMERGENCY)
Facility: HOSPITAL | Age: 65
Discharge: HOME | End: 2025-05-27
Attending: EMERGENCY MEDICINE | Admitting: EMERGENCY MEDICINE
Payer: MEDICARE

## 2025-05-27 VITALS
WEIGHT: 171.96 LBS | TEMPERATURE: 97.8 F | DIASTOLIC BLOOD PRESSURE: 74 MMHG | HEIGHT: 70 IN | BODY MASS INDEX: 24.62 KG/M2 | SYSTOLIC BLOOD PRESSURE: 137 MMHG | HEART RATE: 67 BPM | OXYGEN SATURATION: 96 % | RESPIRATION RATE: 13 BRPM

## 2025-05-27 DIAGNOSIS — K21.9 GASTROESOPHAGEAL REFLUX DISEASE WITHOUT ESOPHAGITIS: Primary | ICD-10-CM

## 2025-05-27 LAB
ALBUMIN SERPL-MCNC: 4.2 G/DL (ref 3.5–5.2)
ALBUMIN/GLOB SERPL: 1.2 G/DL
ALP SERPL-CCNC: 83 U/L (ref 39–117)
ALT SERPL W P-5'-P-CCNC: 24 U/L (ref 1–41)
ANION GAP SERPL CALCULATED.3IONS-SCNC: 10.4 MMOL/L (ref 5–15)
AST SERPL-CCNC: 32 U/L (ref 1–40)
BASOPHILS # BLD AUTO: 0.07 10*3/MM3 (ref 0–0.2)
BASOPHILS NFR BLD AUTO: 0.5 % (ref 0–1.5)
BILIRUB SERPL-MCNC: 1.1 MG/DL (ref 0–1.2)
BILIRUB UR QL STRIP: NEGATIVE
BUN SERPL-MCNC: 13.6 MG/DL (ref 8–23)
BUN/CREAT SERPL: 16 (ref 7–25)
CALCIUM SPEC-SCNC: 9.5 MG/DL (ref 8.6–10.5)
CHLORIDE SERPL-SCNC: 107 MMOL/L (ref 98–107)
CLARITY UR: CLEAR
CO2 SERPL-SCNC: 19.6 MMOL/L (ref 22–29)
COLOR UR: YELLOW
CREAT SERPL-MCNC: 0.85 MG/DL (ref 0.76–1.27)
D-LACTATE SERPL-SCNC: 1 MMOL/L (ref 0.5–2)
DEPRECATED RDW RBC AUTO: 42.9 FL (ref 37–54)
EGFRCR SERPLBLD CKD-EPI 2021: 95.2 ML/MIN/1.73
EOSINOPHIL # BLD AUTO: 0.23 10*3/MM3 (ref 0–0.4)
EOSINOPHIL NFR BLD AUTO: 1.7 % (ref 0.3–6.2)
ERYTHROCYTE [DISTWIDTH] IN BLOOD BY AUTOMATED COUNT: 13.7 % (ref 12.3–15.4)
GLOBULIN UR ELPH-MCNC: 3.5 GM/DL
GLUCOSE SERPL-MCNC: 98 MG/DL (ref 65–99)
GLUCOSE UR STRIP-MCNC: NEGATIVE MG/DL
HCT VFR BLD AUTO: 31.8 % (ref 37.5–51)
HGB BLD-MCNC: 11.5 G/DL (ref 13–17.7)
HGB UR QL STRIP.AUTO: NEGATIVE
IMM GRANULOCYTES # BLD AUTO: 0.05 10*3/MM3 (ref 0–0.05)
IMM GRANULOCYTES NFR BLD AUTO: 0.4 % (ref 0–0.5)
KETONES UR QL STRIP: NEGATIVE
LEUKOCYTE ESTERASE UR QL STRIP.AUTO: NEGATIVE
LIPASE SERPL-CCNC: 10 U/L (ref 13–60)
LYMPHOCYTES # BLD AUTO: 4.48 10*3/MM3 (ref 0.7–3.1)
LYMPHOCYTES NFR BLD AUTO: 33 % (ref 19.6–45.3)
MCH RBC QN AUTO: 30.9 PG (ref 26.6–33)
MCHC RBC AUTO-ENTMCNC: 36.2 G/DL (ref 31.5–35.7)
MCV RBC AUTO: 85.5 FL (ref 79–97)
MONOCYTES # BLD AUTO: 1.25 10*3/MM3 (ref 0.1–0.9)
MONOCYTES NFR BLD AUTO: 9.2 % (ref 5–12)
NEUTROPHILS NFR BLD AUTO: 55.2 % (ref 42.7–76)
NEUTROPHILS NFR BLD AUTO: 7.5 10*3/MM3 (ref 1.7–7)
NITRITE UR QL STRIP: NEGATIVE
PH UR STRIP.AUTO: 5.5 [PH] (ref 5–8)
PLATELET # BLD AUTO: 219 10*3/MM3 (ref 140–450)
PMV BLD AUTO: 11.1 FL (ref 6–12)
POTASSIUM SERPL-SCNC: 4.2 MMOL/L (ref 3.5–5.2)
PROT SERPL-MCNC: 7.7 G/DL (ref 6–8.5)
PROT UR QL STRIP: NEGATIVE
RBC # BLD AUTO: 3.72 10*6/MM3 (ref 4.14–5.8)
SODIUM SERPL-SCNC: 137 MMOL/L (ref 136–145)
SP GR UR STRIP: 1.01 (ref 1–1.03)
UROBILINOGEN UR QL STRIP: NORMAL
WBC NRBC COR # BLD AUTO: 13.58 10*3/MM3 (ref 3.4–10.8)

## 2025-05-27 PROCEDURE — 74177 CT ABD & PELVIS W/CONTRAST: CPT

## 2025-05-27 PROCEDURE — 85025 COMPLETE CBC W/AUTO DIFF WBC: CPT | Performed by: EMERGENCY MEDICINE

## 2025-05-27 PROCEDURE — 81003 URINALYSIS AUTO W/O SCOPE: CPT | Performed by: EMERGENCY MEDICINE

## 2025-05-27 PROCEDURE — 99285 EMERGENCY DEPT VISIT HI MDM: CPT

## 2025-05-27 PROCEDURE — 83690 ASSAY OF LIPASE: CPT | Performed by: EMERGENCY MEDICINE

## 2025-05-27 PROCEDURE — 25510000001 IOPAMIDOL PER 1 ML: Performed by: EMERGENCY MEDICINE

## 2025-05-27 PROCEDURE — 83605 ASSAY OF LACTIC ACID: CPT | Performed by: EMERGENCY MEDICINE

## 2025-05-27 PROCEDURE — 25510000002 DIATRIZOATE MEGLUMINE & SODIUM PER 1 ML: Performed by: EMERGENCY MEDICINE

## 2025-05-27 PROCEDURE — 80053 COMPREHEN METABOLIC PANEL: CPT | Performed by: EMERGENCY MEDICINE

## 2025-05-27 PROCEDURE — 36415 COLL VENOUS BLD VENIPUNCTURE: CPT

## 2025-05-27 RX ORDER — OMEPRAZOLE 20 MG/1
20 CAPSULE, DELAYED RELEASE ORAL DAILY
Qty: 15 CAPSULE | Refills: 0 | Status: SHIPPED | OUTPATIENT
Start: 2025-05-27

## 2025-05-27 RX ORDER — IOPAMIDOL 755 MG/ML
100 INJECTION, SOLUTION INTRAVASCULAR
Status: COMPLETED | OUTPATIENT
Start: 2025-05-27 | End: 2025-05-27

## 2025-05-27 RX ORDER — ONDANSETRON 4 MG/1
4 TABLET, ORALLY DISINTEGRATING ORAL EVERY 6 HOURS PRN
Qty: 8 TABLET | Refills: 0 | Status: SHIPPED | OUTPATIENT
Start: 2025-05-27

## 2025-05-27 RX ORDER — DIATRIZOATE MEGLUMINE AND DIATRIZOATE SODIUM 660; 100 MG/ML; MG/ML
30 SOLUTION ORAL; RECTAL
Status: COMPLETED | OUTPATIENT
Start: 2025-05-27 | End: 2025-05-27

## 2025-05-27 RX ADMIN — IOPAMIDOL 85 ML: 755 INJECTION, SOLUTION INTRAVENOUS at 19:00

## 2025-05-27 RX ADMIN — DIATRIZOATE MEGLUMINE AND DIATRIZOATE SODIUM 30 ML: 660; 100 LIQUID ORAL; RECTAL at 17:45

## 2025-05-27 NOTE — Clinical Note
[facility]  [address]  [place] [address]    Henrry Glover was seen and treated in our emergency department on 5/27/2025.  He may return to work on 05/29/2025.         Thank you for choosing [facility].    Ale Becerra MD

## 2025-05-27 NOTE — FSED PROVIDER NOTE
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 Nausea, no vomiting, no back pain or fever. Larger when stands than it used to be, but no more uncomfortable than when sitting. Soft stool once a day for the past 3 days. No headache or stiff or sore neck, no rash, no dizziness, no chest pain, back pain or abdominal pain. No fever. Nausea, no vomiting, no malaise or weakness, no body aches.     [facility]  Maureen Fraser  PCP Henny Patrick  Referral when needed for hernia repair Nella          Review of Systems    Past Medical History:   Diagnosis Date    Acid reflux     Allergic     Anxiety     Arthritis     Benign essential HTN 04/21/2017    BPH (benign prostatic hyperplasia)     Depression     DVT (deep venous thrombosis)     Eczema     Heart murmur     Hemorrhoid     Hepatitis C     history cleared    History of pneumonia     History of transfusion     Sickle cell anemia     Sinus problem     Sleep apnea     cpap    Tinnitus        Allergies   Allergen Reactions    Levofloxacin Swelling    Penicillins Swelling    Oxycodone-Acetaminophen GI Intolerance    Sulfamethoxazole-Trimethoprim Rash       Past Surgical History:   Procedure Laterality Date    CHOLECYSTECTOMY      COLONOSCOPY  2012    ENDOSCOPY  02/22/2005    HEMORRHOIDECTOMY N/A 6/23/2016    Procedure: HEMORRHOIDECTOMY;  Surgeon: Ct Recio MD;  Location: [facility] OR;  Service:     LIPOMA EXCISION      LYMPHADENECTOMY      WHIPPLE PROCEDURE  07/26/2019    UL    WRIST SURGERY Right     fractured and had plate put in       Family History   Problem Relation Age of Onset    Heart attack Maternal Uncle     Arthritis Mother        Social History     Socioeconomic History    Marital status:    Tobacco Use    Smoking status: Never     Passive exposure: Yes    Smokeless tobacco: Never    Vaping Use    Vaping status: Never Used   Substance and Sexual Activity    Alcohol use: No    Drug use: No    Sexual activity: Defer           Objective   Physical Exam  Vitals and nursing note reviewed.   Constitutional:       General: He is not in acute distress.     Appearance: Normal appearance. He is normal weight. He is not ill-appearing or toxic-appearing.   Eyes:      Extraocular Movements: Extraocular movements intact.      Conjunctiva/sclera: Conjunctivae normal.   Cardiovascular:      Rate and Rhythm: Normal rate and regular rhythm.      Pulses: Normal pulses.      Heart sounds: Normal heart sounds. No murmur heard.  Pulmonary:      Effort: Pulmonary effort is normal. No respiratory distress.      Breath sounds: No stridor. No wheezing, rhonchi or rales.   Abdominal:      General: Bowel sounds are normal. There is no distension.      Palpations: Abdomen is soft.      Tenderness: There is no abdominal tenderness. There is no guarding.      Hernia: A hernia is present.      Comments: Abdominal wall hernia superior and right of umbilicus in area of surgical scar, soft without mass, large area involved of wall, no evidence by exam of entrapment of fat or bowel, not firm   Musculoskeletal:         General: Normal range of motion.      Cervical back: Neck supple.   Skin:     General: Skin is warm and dry.      Capillary Refill: Capillary refill takes less than 2 seconds.   Neurological:      General: No focal deficit present.      Mental Status: He is alert and oriented to person, place, and time. Mental status is at baseline.   Psychiatric:         Mood and Affect: Mood normal.         Behavior: Behavior normal.         Thought Content: Thought content normal.         Judgment: Judgment normal.         Procedures           ED Course  ED Course as of 05/27/25 2153   Tue May 27, 2025   1818 Lipase is low at 10.  Lactate is normal so is not septic his white blood count total is 13.58 which is not too far above  normal his H&H is slightly low but not something that would need transfusion his absolute neutrophils are slightly elevated his absolute lymphocytes are slightly elevated and so are his monocytes.  Urine is negative CMP shows a CO2 that slightly low otherwise negative. [AR]   1853 S/o to Dr Cifuentes HPI PSH [facility], no apparent incarceration on exam, labs good, CT P [AR]      ED Course User Index  [AR] Ale Becerra MD                                           Medical Decision Making   Differential for abdominal pain includes, but not limited to: MI, pericarditis, pneumonia, PE, abdominal aortic aneurysm (AAA), mesenteric ischemia, diabetic ketoacidosis (DKA), hepatic mass or abscess, cholecystitis, cholelithiasis, cholangitis, peptic ulcer, perforating peptic ulcer, esophagitis, appendicitis, peritonitis, IBD, IBS, Crohn's disease, ulcerative colitis, pancreatitis, mass or cyst      He has mild nausea we will take care of that.  He is not having any pain, he does not feel that he needs medicine right now.  Ordering basic labs including lactic acid CBC CMP as well as getting CT abdomen with evaluate the area.  All of his physicians are you available including the physician who pleated the Whipple in 2019.  I checked and I see that she is primary care doctors in our system for conversation.    Patient's laboratories do not show any elevation of the lipase his CT scan does not show any pancreatic tumor his surgery appears to be fine.  The patient's was put at ease were concerning for this but his nausea is being caused most likely by some reflux disease he did kind of go off his beaten path with his diet he was having spaghetti with red sauce he has been having more fried foods and with his Whipple procedure for his pancreatic cancer he has probably pushed his enteral tract too far.  He will refrain get back on his restriction of diet he will be discharged with omeprazole and Zofran.    Amount and/or Complexity  of Data Reviewed  External Data Reviewed:      Details: Review of notes Jaime Leahy MD 2019 Whipple resected pancreatic endocrine tumor with good results. A stent was in place in the pancreas with bile duct dilatation noted and patient was referred to Dr Harmon for stent removal then refer to Dr Carmen if needed to repair abdominal wall hernia at the site of incision. In 2023 stent still in place and 2024 patient seemed to be doing well and hernia then was not causing problems; stent still in place and CT shows small nodule/spots in liver to be followed. Fraser released pt to PCP  Labs: ordered.  Radiology: ordered.    Risk  Prescription drug management.        Final diagnoses:   Gastroesophageal reflux disease without esophagitis       ED Disposition  ED Disposition       ED Disposition   Discharge    Condition   Stable    Comment   --               Henny Patrick MD  [address]  [place] [id number] [phone]    In 2 days           Medication List        New Prescriptions      omeprazole 20 MG capsule  Commonly known as: priLOSEC  Take 1 capsule by mouth Daily.     ondansetron ODT 4 MG disintegrating tablet  Commonly known as: ZOFRAN-ODT  Place 1 tablet on the tongue Every 6 (Six) Hours As Needed for Nausea or Vomiting.               Where to Get Your Medications        These medications were sent to [id number] - [place], [place] - [address] AT [address] - [phone] PH - [phone] FX  [address] [place] [address]      Phone: [phone]   omeprazole 20 MG capsule  ondansetron ODT 4 MG disintegrating tablet

## 2025-05-28 ENCOUNTER — HOSPITAL ENCOUNTER (OUTPATIENT)
Dept: CT IMAGING | Facility: HOSPITAL | Age: 65
Discharge: HOME | End: 2025-05-28
Admitting: NURSE PRACTITIONER
Payer: MEDICARE

## 2025-05-28 DIAGNOSIS — R91.1 LUNG NODULE: ICD-10-CM

## 2025-05-28 PROCEDURE — 71250 CT THORAX DX C-: CPT

## 2025-05-28 NOTE — DISCHARGE INSTRUCTIONS
Refrain from having any caffeine products red sauce or red chase.  Decrease your fried foods.  Use the Zofran for nausea and omeprazole this should help you to get better as well.  Call and follow-up with your primary.  Return to the emergency department if fever greater than 100.4 or severe pain and cannot keep fluids down.

## 2025-05-29 DIAGNOSIS — F41.1 GAD (GENERALIZED ANXIETY DISORDER): ICD-10-CM

## 2025-05-29 RX ORDER — CITALOPRAM HYDROBROMIDE 10 MG/1
10 TABLET ORAL DAILY
Qty: 90 TABLET | Refills: 1 | OUTPATIENT
Start: 2025-05-29

## 2025-06-05 ENCOUNTER — OFFICE VISIT (OUTPATIENT)
Dept: SURGERY | Facility: CLINIC | Age: 65
End: 2025-06-05
Payer: MEDICARE

## 2025-06-05 VITALS
SYSTOLIC BLOOD PRESSURE: 130 MMHG | BODY MASS INDEX: 23.99 KG/M2 | HEART RATE: 65 BPM | WEIGHT: 167.2 LBS | RESPIRATION RATE: 18 BRPM | DIASTOLIC BLOOD PRESSURE: 74 MMHG | OXYGEN SATURATION: 95 %

## 2025-06-05 DIAGNOSIS — R91.1 LUNG NODULE: Primary | ICD-10-CM

## 2025-06-05 PROCEDURE — 1159F MED LIST DOCD IN RCRD: CPT | Performed by: NURSE PRACTITIONER

## 2025-06-05 PROCEDURE — 1160F RVW MEDS BY RX/DR IN RCRD: CPT | Performed by: NURSE PRACTITIONER

## 2025-06-05 PROCEDURE — 3078F DIAST BP <80 MM HG: CPT | Performed by: NURSE PRACTITIONER

## 2025-06-05 PROCEDURE — 3075F SYST BP GE 130 - 139MM HG: CPT | Performed by: NURSE PRACTITIONER

## 2025-06-05 PROCEDURE — 99214 OFFICE O/P EST MOD 30 MIN: CPT | Performed by: NURSE PRACTITIONER

## 2025-06-05 RX ORDER — FLUCONAZOLE 150 MG/1
150 TABLET ORAL
COMMUNITY

## 2025-06-05 RX ORDER — OXYBUTYNIN CHLORIDE 5 MG/1
5 TABLET, EXTENDED RELEASE ORAL DAILY
COMMUNITY

## 2025-06-05 RX ORDER — SERTRALINE HYDROCHLORIDE 25 MG/1
25 TABLET, FILM COATED ORAL DAILY
COMMUNITY

## 2025-06-05 NOTE — PROGRESS NOTES
"Chief Complaint  F/U, lung nodules, hx Well-differentiated neuroendocrine tumor of pancreas     Subjective        Henrry Glover presents to [facility] THORACIC SURGERY  History of Present Illness    Mr. Glover is a very pleasant 65-year-old gentleman who presents today in follow-up for continued surveillance of his pulmonary nodules.  He has been established with our practice with Dr. Camacho since November 2023 he has a history of well-differentiated neuroendocrine tumor status post Whipple procedure in 2019.  He is a never smoker.  He remains very active in his daily life and continues to work.  He presents today feeling well with CT chest.  He has no new pulmonary symptoms, recent illnesses.            Objective   Vital Signs:  /74 (BP Location: Right arm, Patient Position: Sitting, Cuff Size: Adult)   Pulse 65   Resp 18   Wt 75.8 kg (167 lb 3.2 oz)   SpO2 95%   BMI 23.99 kg/m²   Estimated body mass index is 23.99 kg/m² as calculated from the following:    Height as of 5/27/25: 177.8 cm (70\").    Weight as of this encounter: 75.8 kg (167 lb 3.2 oz).       BMI is within normal parameters. No other follow-up for BMI required.      Physical Exam  Constitutional:       General: He is not in acute distress.     Appearance: Normal appearance.   HENT:      Head: Normocephalic and atraumatic.      Nose: Nose normal.   Cardiovascular:      Rate and Rhythm: Normal rate.      Pulses: Normal pulses.   Pulmonary:      Effort: Pulmonary effort is normal.   Musculoskeletal:         General: Normal range of motion.      Cervical back: Normal range of motion.   Skin:     General: Skin is warm and dry.   Neurological:      General: No focal deficit present.      Mental Status: He is alert and oriented to person, place, and time. Mental status is at baseline.   Psychiatric:         Mood and Affect: Mood normal.         Behavior: Behavior normal.         Thought Content: Thought content normal.         " Judgment: Judgment normal.       Exam unchanged 6/5/2025  Result Review :    CT chest 5/28/2025: Stable bilateral sub-6 mm nodules.  No mediastinal or hilar lymphadenopathy.  CAD as before.  4.1 cm dilation of the ascending thoracic aorta.    CT chest 9/20/2024: The right lung base there is a nodular opacity and pleural thickening without change.  There are 2 small sub-5 mm nodules in the right upper lobe, that were not evident previously.  No mediastinal or hilar lymphadenopathy.  No pleural or pericardial effusion.           Assessment and Plan   Diagnoses and all orders for this visit:    1. Lung nodule (Primary)  -     CT Chest Without Contrast; Future      Mr. Glover presents today in follow-up for continued surveillance of multiple sub-6 mm pulmonary nodules that are unchanged on his most recent CT chest.  I have recommended 1 more CT chest in 6 months interval to complete 2 years of documented stability.  I suspect these are benign in nature, but would like to continue to monitor them.  Imaging was reviewed with him today and he is in agreement with this plan.         I spent 31 minutes caring for Henrry on this date of service. This time includes time spent by me in the following activities:preparing for the visit, reviewing tests, obtaining and/or reviewing a separately obtained history, performing a medically appropriate examination and/or evaluation , counseling and educating the patient/family/caregiver, ordering medications, tests, or procedures, referring and communicating with other health care professionals , documenting information in the medical record, independently interpreting results and communicating that information with the patient/family/caregiver, and care coordination  Follow Up   Return in about 6 months (around 12/5/2025) for Next scheduled follow up.  Patient was given instructions and counseling regarding his condition or for health maintenance advice. Please see specific information  pulled into the AVS if appropriate.

## 2025-06-06 ENCOUNTER — OFFICE VISIT (OUTPATIENT)
Dept: FAMILY MEDICINE CLINIC | Facility: CLINIC | Age: 65
End: 2025-06-06
Payer: MEDICARE

## 2025-06-06 VITALS
HEART RATE: 60 BPM | DIASTOLIC BLOOD PRESSURE: 60 MMHG | BODY MASS INDEX: 24.2 KG/M2 | OXYGEN SATURATION: 97 % | HEIGHT: 70 IN | WEIGHT: 169 LBS | RESPIRATION RATE: 16 BRPM | SYSTOLIC BLOOD PRESSURE: 110 MMHG

## 2025-06-06 DIAGNOSIS — R11.0 NAUSEA: Primary | ICD-10-CM

## 2025-06-06 DIAGNOSIS — K21.9 GASTROESOPHAGEAL REFLUX DISEASE, UNSPECIFIED WHETHER ESOPHAGITIS PRESENT: ICD-10-CM

## 2025-06-06 NOTE — PROGRESS NOTES
Assessment & Plan  Gastroesophageal Reflux Disease.  Symptoms exacerbated by consumption of tomato sauce and fried foods, leading to nausea. Post-Whipple procedure, reduced enzyme production affects digestion of certain foods. Urgent care visit indicated GERD as the cause of symptoms. Zofran prescribed for nausea management.    Enlarged Prostate.  Currently managed by a urologist. No new treatment discussed during this visit. Continues to follow up with urologist for ongoing care. No additional concerns raised during this encounter.    Follow-up  Annual wellness visit scheduled with Dr. Georges for continuity of care.        Nausea  Probably related to dietary indiscretions and history of pancreatic surgery. Will give zofran prn and he will avoid offending foods          Patient was given instructions and counseling regarding his condition or for health maintenance advice. Please see specific information pulled into the AVS if appropriate.          Henrry is a 65 y.o. being seen today for  Nausea   HISTORY    HPI      The patient presents for evaluation of GERD and hiatal hernia.    Initially, he suspected his symptoms were related to his hiatal hernia. However, after visiting urgent care and undergoing CT scans, it was determined that acid reflux was the primary issue, potentially exacerbated by dietary choices. He consumed spaghetti and fried food, which led to nausea and general malaise. Despite these symptoms, he reports feeling well at present. The urgent care physician advised him to avoid red sauce and fried foods, and to abstain from red wine. He continues to experience gas-related issues, but the severe symptoms that prompted his urgent care visit have not recurred. He was prescribed Zofran for nausea management.    He had a consultation with his surgeon, who performed a Whipple procedure for pancreatic tumor removal less than a year ago. During this visit, the surgeon inquired about his hernia, which  was not causing him any discomfort at the time. The surgeon decided against discussing surgical removal of the hernia since it was not symptomatic. He was informed that post-surgery nausea could be intermittent.    He has an enlarged prostate and is under the care of a urologist.    PAST SURGICAL HISTORY:  Whipple procedure for pancreatic tumor removal less than a year ago.    SOCIAL HISTORY  He does not drink alcohol.     Social History  He  reports that he has never smoked. He has been exposed to tobacco smoke. He has never used smokeless tobacco. He reports that he does not drink alcohol and does not use drugs.  EXAM DATA    Vital Signs        BP Readings from Last 1 Encounters:   06/06/25 110/60     Wt Readings from Last 3 Encounters:   06/06/25 76.7 kg (169 lb)   06/05/25 75.8 kg (167 lb 3.2 oz)   05/27/25 78 kg (171 lb 15.3 oz)   Body mass index is 24.25 kg/m².  Physical Exam      General Appearance: Normal.  Respiratory: Within normal limits.  Cardiovascular: Within normal limits.  Skin: Warm and dry, no rash.  Psychiatric: Normal judgement and affect.              Patient or patient representative verbalized consent for the use of Ambient Listening during the visit with  Henny Patrick MD for chart documentation. 6/6/2025  16:44 EDT

## 2025-06-13 DIAGNOSIS — F41.9 ANXIETY: ICD-10-CM

## 2025-06-13 RX ORDER — LORAZEPAM 0.5 MG/1
0.5 TABLET ORAL DAILY PRN
Qty: 25 TABLET | Refills: 0 | Status: SHIPPED | OUTPATIENT
Start: 2025-06-13

## 2025-06-20 RX ORDER — OMEPRAZOLE 20 MG/1
20 CAPSULE, DELAYED RELEASE ORAL DAILY
Qty: 15 CAPSULE | Refills: 0 | Status: SHIPPED | OUTPATIENT
Start: 2025-06-20

## 2025-06-20 NOTE — TELEPHONE ENCOUNTER
Caller: Henrry Glover    Relationship: Self    Best call back number: [phone]     Requested Prescriptions:   Requested Prescriptions     Pending Prescriptions Disp Refills    omeprazole (priLOSEC) 20 MG capsule 15 capsule 0     Sig: Take 1 capsule by mouth Daily.        Pharmacy where request should be sent: [id number] [address] AT [facility] [phone] [facility] [phone]      Last office visit with prescribing clinician: 6/6/2025   Last telemedicine visit with prescribing clinician: Visit date not found   Next office visit with prescribing clinician: Visit date not found     Additional details provided by patient: [facility] ONLY GAVE 15 PILLS. HE THINKS HE HAS 4 PILLS LEFT AND IT HAS BEEN HELPING HIS GERD. HE WOULD LIKE A REFILL AND TO MAKE SURE HE IS TO CONTINUE TAKING EVERY DAY. PLEASE ADVISE.    Charlene Castrejon, Hector Rep   06/20/25 15:19 EDT

## 2025-06-24 ENCOUNTER — TELEPHONE (OUTPATIENT)
Dept: FAMILY MEDICINE CLINIC | Facility: CLINIC | Age: 65
End: 2025-06-24

## 2025-06-24 NOTE — TELEPHONE ENCOUNTER
Caller: Henrry Glover    Relationship to patient: Self    Best call back number:     Patient is needing: PATIENT STATES HE IS HAVING CRAMPS IN HIS LEGS AT NIGHT.  PATIENT WOULD LIKE A CALLBACK FROM THE OFFICE TO ADVISE HIM IF THERE IS A VITAMIN LIKE POTASSIUM OR MAGNESIUM, OR SOMETHING ELSE,  THAT HE COULD TAKE THAT WOULD HELP HIM GET RID OF THE CRAMPS IN HIS LEGS.   PATIENT STATES IT HAS BEEN OCCURRING FOR AWHILE NOW AND HE KEEPS FORGETTING TO ASK DR. CASTRO WHEN HE COMES IN TO SEE HER FOR HIS APPOINTMENTS.   PLEASE CALL THE PATIENT TO ADVISE HIM ON THIS.

## 2025-06-27 DIAGNOSIS — I10 BENIGN ESSENTIAL HTN: ICD-10-CM

## 2025-06-27 RX ORDER — AMLODIPINE BESYLATE 5 MG/1
5 TABLET ORAL DAILY
Qty: 90 TABLET | Refills: 1 | Status: SHIPPED | OUTPATIENT
Start: 2025-06-27

## 2025-07-02 RX ORDER — OMEPRAZOLE 20 MG/1
20 CAPSULE, DELAYED RELEASE ORAL DAILY
Qty: 30 CAPSULE | Refills: 1 | Status: SHIPPED | OUTPATIENT
Start: 2025-07-02

## 2025-07-11 ENCOUNTER — OFFICE VISIT (OUTPATIENT)
Dept: SURGERY | Facility: CLINIC | Age: 65
End: 2025-07-11
Payer: MEDICARE

## 2025-07-11 VITALS
SYSTOLIC BLOOD PRESSURE: 124 MMHG | WEIGHT: 166 LBS | OXYGEN SATURATION: 96 % | HEIGHT: 70 IN | HEART RATE: 64 BPM | BODY MASS INDEX: 23.77 KG/M2 | DIASTOLIC BLOOD PRESSURE: 68 MMHG

## 2025-07-11 DIAGNOSIS — K64.4 EXTERNAL HEMORRHOID: Primary | ICD-10-CM

## 2025-07-11 DIAGNOSIS — K64.8 INTERNAL HEMORRHOID: ICD-10-CM

## 2025-07-11 RX ORDER — HYDROCORTISONE 25 MG/G
CREAM TOPICAL
Qty: 30 G | Refills: 1 | Status: SHIPPED | OUTPATIENT
Start: 2025-07-11

## 2025-07-11 RX ORDER — NITROGLYCERIN 0.4 MG/1
TABLET SUBLINGUAL
COMMUNITY

## 2025-07-11 RX ORDER — MELOXICAM 15 MG/1
15 TABLET ORAL DAILY
COMMUNITY
End: 2025-07-11

## 2025-07-11 NOTE — PROGRESS NOTES
Henrry Glover is a 65 y.o. male who is seen as a consult at the request of No ref. provider found for Hemorrhoids.      HPI:  History of Present Illness  Pt is S/P internal and external hemorrhoidectomy x3 on 06/26/2016. States he was doing well until a couple of months ago. Started experiencing intermittent flare-ups. He denies any significant pain, but does report some swelling, itching, and irritation. He also notes minimal bleeding on occasion. Apply a OTC hemorrhoid cream with some relief. Pt states that he typically wakes up in the middle of the night to have a BM. Often falls asleep on the commode and believes this to be triggering his symptoms. He has avoiding this lately in attempt to reduce symptoms as he does not want to undergo another hemorrhoidectomy.     Pt states that he underwent a colonoscopy a few months ago at an OSH. 2 polyps were found and he was given a 5 year recall. Not taking any anticoagulation.      Past Medical History:   Diagnosis Date    Acid reflux     Allergic     Anxiety     Arthritis     Benign essential HTN 04/21/2017    BPH (benign prostatic hyperplasia)     Depression     DVT (deep venous thrombosis)     Eczema     Heart murmur     Hemorrhoid     Hepatitis C     history cleared    History of pneumonia     History of transfusion     Sickle cell anemia     Sinus problem     Sleep apnea     cpap    Tinnitus        Past Surgical History:   Procedure Laterality Date    CHOLECYSTECTOMY      COLONOSCOPY  2012    ENDOSCOPY  02/22/2005    HEMORRHOIDECTOMY N/A 6/23/2016    Procedure: HEMORRHOIDECTOMY;  Surgeon: Ct Recio MD;  Location: [facility];  Service:     LIPOMA EXCISION      LYMPHADENECTOMY      WHIPPLE PROCEDURE  07/26/2019    [facility]    WRIST SURGERY Right     fractured and had plate put in       Social History:   reports that he quit smoking about 44 years ago. His smoking use included cigarettes. He started smoking about 54 years ago. He has a 2.5 pack-year smoking  history. He has been exposed to tobacco smoke. He has never used smokeless tobacco. He reports that he does not drink alcohol and does not use drugs.      Marriage status:     Family History   Problem Relation Age of Onset    Heart attack Maternal Uncle     Arthritis Mother          Current Outpatient Medications:     acetaminophen (TYLENOL) 325 MG tablet, Take 2 tablets by mouth Every 6 (Six) Hours As Needed for Mild Pain., Disp: , Rfl:     amLODIPine (NORVASC) 5 MG tablet, TAKE 1 TABLET BY MOUTH DAILY, Disp: 90 tablet, Rfl: 1    Ascorbic Acid (VITAMIN C PO), Take 1 dose by mouth Daily., Disp: , Rfl:     cetirizine (zyrTEC) 10 MG tablet, Take 1 tablet by mouth Daily., Disp: , Rfl:     citalopram (CeleXA) 10 MG tablet, TAKE 1 TABLET BY MOUTH DAILY, Disp: 90 tablet, Rfl: 1    LORazepam (ATIVAN) 0.5 MG tablet, TAKE 1 TABLET BY MOUTH DAILY AS NEEDED FOR ANXIETY, Disp: 25 tablet, Rfl: 0    montelukast (SINGULAIR) 10 MG tablet, TAKE 1 TABLET BY MOUTH AT BEDTIME, Disp: 90 tablet, Rfl: 2    Multiple Vitamins-Minerals (MULTIVITAMIN PO), Take 1 tablet by mouth Daily., Disp: , Rfl:     nitroglycerin (NITROSTAT) 0.4 MG SL tablet, PLACE 1 TABLET UNDER THE TONGUE EVERY 5 MINUTES AS NEEDED FOR CHEST PAIN. TAKE NO MORE THAN 3 DOSES IN 15 MINUTES, Disp: , Rfl:     omeprazole (priLOSEC) 20 MG capsule, TAKE 1 CAPSULE BY MOUTH DAILY, Disp: 30 capsule, Rfl: 1    ondansetron ODT (ZOFRAN-ODT) 4 MG disintegrating tablet, Place 1 tablet on the tongue Every 6 (Six) Hours As Needed for Nausea or Vomiting., Disp: 8 tablet, Rfl: 0    oxybutynin XL (DITROPAN-XL) 5 MG 24 hr tablet, Take 1 tablet by mouth Daily., Disp: , Rfl:     tamsulosin (FLOMAX) 0.4 MG capsule 24 hr capsule, Take 1 capsule by mouth 2 (Two) Times a Day., Disp: , Rfl: 4    triamcinolone (KENALOG) 0.025 % cream, APPLY TOPICALLY TO THE AFFECTED AREA THREE TIMES DAILY, Disp: 240 g, Rfl: 0    Hydrocortisone, Perianal, (Anusol-HC) 2.5 % rectal cream, Apply rectally 3 times  daily.  Include applicator., Disp: 30 g, Rfl: 1    nystatin (MYCOSTATIN) 100,000 unit/mL suspension, SHAKE LIQUID AND TAKE 1 ML BY MOUTH FOUR TIMES DAILY, Disp: 60 mL, Rfl: 1    pantoprazole (PROTONIX) 40 MG EC tablet, Take 1 tablet by mouth Daily. (Patient not taking: Reported on 7/11/2025), Disp: , Rfl:     Allergy  Levofloxacin, Penicillins, Oxycodone-acetaminophen, and Sulfamethoxazole-trimethoprim      Vitals:    07/11/25 1321   BP: 124/68   Pulse: 64   SpO2: 96%     Body mass index is 23.82 kg/m².      Physical Exam    Physical Exam  Exam conducted with a chaperone present.   Constitutional:       General: He is not in acute distress.     Appearance: He is well-developed.   HENT:      Head: Normocephalic and atraumatic.      Nose: Nose normal.   Eyes:      Conjunctiva/sclera: Conjunctivae normal.      Pupils: Pupils are equal, round, and reactive to light.   Neck:      Trachea: No tracheal deviation.   Pulmonary:      Effort: Pulmonary effort is normal. No respiratory distress.      Breath sounds: Normal breath sounds.   Abdominal:      General: Bowel sounds are normal. There is no distension.      Palpations: Abdomen is soft.   Genitourinary:     Comments: Perianal exam: Small anal skin tags. No significant enlargement of the external hemorrhoids.   PIOTR- Good tone, no masses  Anoscopy performed: Grade I x 3 internal hemorrhoids.   Musculoskeletal:         General: No deformity. Normal range of motion.      Cervical back: Normal range of motion.   Skin:     General: Skin is warm and dry.   Neurological:      Mental Status: He is alert and oriented to person, place, and time.      Cranial Nerves: No cranial nerve deficit.      Coordination: Coordination normal.      Gait: Gait normal.   Psychiatric:         Behavior: Behavior normal.         Judgment: Judgment normal.         Review of Medical Record:  I reviewed medical records as detailed in HPI.     Relevant/Anorectal Surgical Hx:  - Internal and external  hemorrhoidectomy x3 on 06/26/2016 (Dr. Ct Recio, [facility])    Colonoscopy 09/11/2023:  - 2 mm polyp, ascending colon. Path: Fragments of clonic mucosa with focal adenomatous changes.   - 2 mm polyp, descending colon. Path: Colonic mucosa with prominent lymphoid aggregate and focal hyperplastic changes.  - Large external hemorrhoids  - Dr. Luciana Wolf, [facility]    Assessment & Plan    Assessment:   1. External hemorrhoid    2. Internal hemorrhoid    - New    Plan:  - Rx for Anusol-HC 2.5% cream provided. Pt instructed to apply TID x1 week for active flare-ups. Cautioned against chronic use.   - Recommended OTC Recticare and/or barrier cream (Aquaphor, Desitin, Calmoseptine) PRN for perianal pruritus.   - Reviewed common causes of hemorrhoid enlargement and ways to reduce risks of future flare-ups. Encouraged good bowel habits and avoidance of straining.   - Follow up as needed.

## 2025-07-14 DIAGNOSIS — J45.909 ASTHMA, UNSPECIFIED ASTHMA SEVERITY, UNSPECIFIED WHETHER COMPLICATED, UNSPECIFIED WHETHER PERSISTENT: ICD-10-CM

## 2025-07-14 RX ORDER — MONTELUKAST SODIUM 10 MG/1
10 TABLET ORAL
Qty: 90 TABLET | Refills: 2 | Status: SHIPPED | OUTPATIENT
Start: 2025-07-14

## 2025-07-28 DIAGNOSIS — F41.9 ANXIETY: ICD-10-CM

## 2025-07-28 RX ORDER — LORAZEPAM 0.5 MG/1
0.5 TABLET ORAL DAILY PRN
Qty: 25 TABLET | Refills: 0 | Status: SHIPPED | OUTPATIENT
Start: 2025-07-28

## 2025-08-15 ENCOUNTER — OFFICE VISIT (OUTPATIENT)
Dept: FAMILY MEDICINE CLINIC | Facility: CLINIC | Age: 65
End: 2025-08-15
Payer: MEDICARE

## 2025-08-15 VITALS
WEIGHT: 160.8 LBS | SYSTOLIC BLOOD PRESSURE: 120 MMHG | HEIGHT: 70 IN | BODY MASS INDEX: 23.02 KG/M2 | OXYGEN SATURATION: 98 % | HEART RATE: 69 BPM | DIASTOLIC BLOOD PRESSURE: 68 MMHG | RESPIRATION RATE: 16 BRPM

## 2025-08-15 DIAGNOSIS — K21.9 GASTROESOPHAGEAL REFLUX DISEASE, UNSPECIFIED WHETHER ESOPHAGITIS PRESENT: ICD-10-CM

## 2025-08-15 DIAGNOSIS — D57.1 SICKLE CELL DISEASE WITHOUT CRISIS: ICD-10-CM

## 2025-08-15 DIAGNOSIS — F41.1 GAD (GENERALIZED ANXIETY DISORDER): ICD-10-CM

## 2025-08-15 DIAGNOSIS — Z79.899 HIGH RISK MEDICATION USE: ICD-10-CM

## 2025-08-15 DIAGNOSIS — Z00.00 ANNUAL PHYSICAL EXAM: ICD-10-CM

## 2025-08-15 DIAGNOSIS — D3A.8 PRIMARY PANCREATIC NEUROENDOCRINE TUMOR: ICD-10-CM

## 2025-08-15 DIAGNOSIS — I10 PRIMARY HYPERTENSION: ICD-10-CM

## 2025-08-15 DIAGNOSIS — I77.89 ASCENDING AORTA ENLARGEMENT: ICD-10-CM

## 2025-08-15 DIAGNOSIS — N40.0 BENIGN PROSTATIC HYPERPLASIA WITHOUT LOWER URINARY TRACT SYMPTOMS: ICD-10-CM

## 2025-08-15 DIAGNOSIS — Z00.00 ENCOUNTER FOR MEDICARE ANNUAL WELLNESS EXAM: Primary | ICD-10-CM

## 2025-08-15 PROBLEM — I36.1 NONRHEUMATIC TRICUSPID VALVE REGURGITATION: Status: ACTIVE | Noted: 2025-08-15
